# Patient Record
Sex: FEMALE | Race: WHITE | Employment: OTHER | URBAN - METROPOLITAN AREA
[De-identification: names, ages, dates, MRNs, and addresses within clinical notes are randomized per-mention and may not be internally consistent; named-entity substitution may affect disease eponyms.]

---

## 2019-10-11 ENCOUNTER — OFFICE VISIT (OUTPATIENT)
Dept: PHYSICAL THERAPY | Age: 79
End: 2019-10-11
Attending: SPECIALIST
Payer: MEDICARE

## 2019-10-11 ENCOUNTER — ORDER TRANSCRIPTION (OUTPATIENT)
Dept: PHYSICAL THERAPY | Age: 79
End: 2019-10-11

## 2019-10-11 DIAGNOSIS — R26.9 ABNORMAL GAIT: ICD-10-CM

## 2019-10-11 DIAGNOSIS — R26.9 ABNORMAL GAIT: Primary | ICD-10-CM

## 2019-10-11 DIAGNOSIS — M54.12 RADICULOPATHY, CERVICAL: ICD-10-CM

## 2019-10-11 DIAGNOSIS — M75.102 ROTATOR CUFF SYNDROME OF LEFT SHOULDER: ICD-10-CM

## 2019-10-11 PROCEDURE — 97110 THERAPEUTIC EXERCISES: CPT | Performed by: PHYSICAL THERAPIST

## 2019-10-11 PROCEDURE — 97162 PT EVAL MOD COMPLEX 30 MIN: CPT | Performed by: PHYSICAL THERAPIST

## 2019-10-11 NOTE — PROGRESS NOTES
SPINE EVALUATION:   Referring Physician: Dr. Dalila Garcia  Diagnosis: Abnormal gait (R26.9)  Rotator cuff syndrome of left shoulder (M75.102)  Radiculopathy, cervical (M54.12)   Date of Service: 10/11/2019   Date of Onset: June 2019    PATIENT Mani ROBERTS lordosis; posture correction relieved ache/pain in the neck, upper/lower back. Gait:   Uses a rolling walker (tall) with a narrow KEKE, short step length, decreased heel-toe, and push-off pattern; slow, safe, and deliberate gait.     ROM:  Lumbar Movement to promote her symptomfree condition. Patient was advised of these findings, precautions, and treatment options and has agreed to actively participate in planning/goal setting for this course of care.     Frequency / Duration: Patient will be seen for 2

## 2019-10-14 ENCOUNTER — OFFICE VISIT (OUTPATIENT)
Dept: PHYSICAL THERAPY | Age: 79
End: 2019-10-14
Attending: SPECIALIST
Payer: MEDICARE

## 2019-10-14 PROCEDURE — 97110 THERAPEUTIC EXERCISES: CPT | Performed by: PHYSICAL THERAPIST

## 2019-10-14 NOTE — PROGRESS NOTES
Date: 10/14/2019     Dx: Abnormal gait (R26.9)  Rotator cuff syndrome of left shoulder (M75.102)  Radiculopathy, cervical (M54.12)      Authorized # of Visits:  24       Referring MD: Meliton Haider.   Next MD visit: none scheduled    Medication Changes since last discomfort  4. Patient to consistently have good posture to promote her symptomfree condition. Plan:   Re-assess next session and continue to progress her neck, back, (L) shoulder, and LEs exercises. Charges:  TherEx x 3       Total Ti

## 2019-10-18 ENCOUNTER — OFFICE VISIT (OUTPATIENT)
Dept: PHYSICAL THERAPY | Age: 79
End: 2019-10-18
Attending: SPECIALIST
Payer: MEDICARE

## 2019-10-18 PROCEDURE — 97110 THERAPEUTIC EXERCISES: CPT | Performed by: PHYSICAL THERAPIST

## 2019-10-18 NOTE — PROGRESS NOTES
Date: 10/18/2019     Dx: Abnormal gait (R26.9)  Rotator cuff syndrome of left shoulder (M75.102)  Radiculopathy, cervical (M54.12)      Authorized # of Visits:  24       Referring MD: Miri Cox.   Next MD visit: none scheduled    Medication Changes since last Her symptom produced was tightness in the C7T1 area that diminished with repetition. She was then feeling more stretch and tightness at the low back with c/s retractions.   She was sitting with good posture with lumbar roll used vertically in the middle of

## 2019-10-21 ENCOUNTER — OFFICE VISIT (OUTPATIENT)
Dept: PHYSICAL THERAPY | Age: 79
End: 2019-10-21
Attending: SPECIALIST
Payer: MEDICARE

## 2019-10-21 PROCEDURE — 97110 THERAPEUTIC EXERCISES: CPT | Performed by: PHYSICAL THERAPIST

## 2019-10-21 NOTE — PROGRESS NOTES
Date: 10/21/2019     Dx: Abnormal gait (R26.9)  Rotator cuff syndrome of left shoulder (M75.102)  Radiculopathy, cervical (M54.12)      Authorized # of Visits:  24       Referring MD: Eleanor Simeon.   Next MD visit: none scheduled    Medication Changes since last over rail x 10 reps         Assessment:   Initiated (B) LE strengthening and stability exercises. Only performed one set of each at this time due to patient may get sore since it is her first time to do these exercises.   She continued to perform extension

## 2019-10-25 ENCOUNTER — OFFICE VISIT (OUTPATIENT)
Dept: PHYSICAL THERAPY | Age: 79
End: 2019-10-25
Attending: SPECIALIST
Payer: MEDICARE

## 2019-10-25 PROCEDURE — 97110 THERAPEUTIC EXERCISES: CPT | Performed by: PHYSICAL THERAPIST

## 2019-10-25 NOTE — PROGRESS NOTES
Date: 10/25/2019     Dx: Abnormal gait (R26.9)  Rotator cuff syndrome of left shoulder (M75.102)  Radiculopathy, cervical (M54.12)      Authorized # of Visits:  24       Referring MD: Yari John.   Next MD visit: none scheduled    Medication Changes since last reps    Shuttle: (R/L) leg press 4b x 20 reps    Shuttle: (B) calf raises 4b x 20 reps     Seated: c/s extension x 10 reps    (B) UE n.row, extension greenTB 10 reps x 10 cts ea    (B) LE hip abduction and hip flexion with redTB 2 x 10 reps ea    PERRI over session and continue to progress her neck, back, (L) shoulder, and LEs exercises. Charges:  TherEx x 4       Total Timed Treatment: 54 mins Total Treatment Time: 58 mins

## 2019-10-28 ENCOUNTER — OFFICE VISIT (OUTPATIENT)
Dept: PHYSICAL THERAPY | Age: 79
End: 2019-10-28
Attending: SPECIALIST
Payer: MEDICARE

## 2019-10-28 PROCEDURE — 97110 THERAPEUTIC EXERCISES: CPT | Performed by: PHYSICAL THERAPIST

## 2019-10-28 NOTE — PROGRESS NOTES
Date: 10/28/2019     Dx: Abnormal gait (R26.9)  Rotator cuff syndrome of left shoulder (M75.102)  Radiculopathy, cervical (M54.12)      Authorized # of Visits:  24       Referring MD: Steffen Banerjee.   Next MD visit: none scheduled    Medication Changes since last 4b x 20 reps    Shuttle: (B) calf raises 4b x 20 reps     Seated: c/s extension x 10 reps    (B) UE n.row, extension greenTB 10 reps x 10 cts ea    (B) LE hip abduction and hip flexion with redTB 2 x 10 reps ea    PERRI over rail x 10 reps Scifit UE only L4 next session and continue to progress her neck, back, (L) shoulder, and LEs exercises. Charges:  TherEx x 3       Total Timed Treatment: 48 mins Total Treatment Time: 50 mins

## 2019-11-01 ENCOUNTER — OFFICE VISIT (OUTPATIENT)
Dept: PHYSICAL THERAPY | Age: 79
End: 2019-11-01
Attending: SPECIALIST
Payer: MEDICARE

## 2019-11-01 PROCEDURE — 97110 THERAPEUTIC EXERCISES: CPT | Performed by: PHYSICAL THERAPIST

## 2019-11-01 NOTE — PROGRESS NOTES
Date: 11/1/2019     Dx: Abnormal gait (R26.9)  Rotator cuff syndrome of left shoulder (M75.102)  Radiculopathy, cervical (M54.12)      Authorized # of Visits:  24       Referring MD: Truong Mayo.   Next MD visit: none scheduled    Medication Changes since last 10 cts ea    (B) LE hip abduction and hip flexion with redTB 2 x 10 reps ea    PERRI over rail x 10 reps Scifit UE only L4 x 5 mins fwd/bkwd;  No soreness (B) shoulders    Seated: c/s retraction x 10 reps; P,NW mid neck     + self OP x 10 reps; P, NW [facility] paper work activity she does for her sister, cooking, gardening, taking care of pet birds, playing cards, reading, watching TV, and using her phone) activities without discomfort  4.  Patient to consistently have good posture to promote her symptomfree cond

## 2019-11-05 ENCOUNTER — OFFICE VISIT (OUTPATIENT)
Dept: PHYSICAL THERAPY | Age: 79
End: 2019-11-05
Attending: ORTHOPAEDIC SURGERY
Payer: MEDICARE

## 2019-11-05 PROCEDURE — 97110 THERAPEUTIC EXERCISES: CPT | Performed by: PHYSICAL THERAPIST

## 2019-11-05 NOTE — PROGRESS NOTES
Date: 11/5/2019     Dx: Abnormal gait (R26.9)  Rotator cuff syndrome of left shoulder (M75.102)  Radiculopathy, cervical (M54.12)      Authorized # of Visits:  24       Referring MD: Claudia Reed.   Next MD visit: none scheduled    Medication Changes since last with redTB 2 x 10 reps ea    PERRI over rail x 10 reps Scifit UE only L4 x 5 mins fwd/bkwd;  No soreness (B) shoulders    Seated: c/s retraction x 10 reps; P,NW mid neck     + self OP x 10 reps; P, NW mid neck and low back     + extension 2 x 10 reps; P, NW Therapy Goals      (24 visits)  1. Patient to consistently perform her HEP and it's progression to maintain her improved condition.    2. Patient to have reduced, centralized, and abolished symptoms in the low back and neck to enable easier ADLs, functional

## 2019-11-07 ENCOUNTER — APPOINTMENT (OUTPATIENT)
Dept: PHYSICAL THERAPY | Age: 79
End: 2019-11-07
Attending: ORTHOPAEDIC SURGERY
Payer: MEDICARE

## 2019-11-12 ENCOUNTER — OFFICE VISIT (OUTPATIENT)
Dept: PHYSICAL THERAPY | Age: 79
End: 2019-11-12
Attending: ORTHOPAEDIC SURGERY
Payer: MEDICARE

## 2019-11-12 PROCEDURE — 97110 THERAPEUTIC EXERCISES: CPT | Performed by: PHYSICAL THERAPIST

## 2019-11-12 NOTE — PROGRESS NOTES
Date: 11/12/2019     Dx: Abnormal gait (R26.9)  Rotator cuff syndrome of left shoulder (M75.102)  Radiculopathy, cervical (M54.12)      Authorized # of Visits:  24       Referring MD: Aurora Pace.   Next MD visit: none scheduled    Medication Changes since last 10 reps; P, NW mid neck and low back     + extension 2 x 10 reps; P, NW mid neck (better)    (B) UE greenTB n.row, extension, w.row 10 rep x 10 cts ea; NE    Lateral walk with CGA with redTB abd resist 3 laps x 20 feet    Shuttle: (B) leg press 8b x 20 rep recreational activities.    3. Patient to have WNL of lumbar and cervical mobility in all directions to facilitate a return to all (home paper work activity she does for her sister, cooking, gardening, taking care of pet birds, playing cards, reading, watch

## 2019-11-14 ENCOUNTER — OFFICE VISIT (OUTPATIENT)
Dept: PHYSICAL THERAPY | Age: 79
End: 2019-11-14
Attending: ORTHOPAEDIC SURGERY
Payer: MEDICARE

## 2019-11-14 PROCEDURE — 97110 THERAPEUTIC EXERCISES: CPT | Performed by: PHYSICAL THERAPIST

## 2019-11-14 NOTE — PROGRESS NOTES
Date: 11/14/2019     Dx: Abnormal gait (R26.9)  Rotator cuff syndrome of left shoulder (M75.102)  Radiculopathy, cervical (M54.12)      Authorized # of Visits:  24       Referring MD: Jeffrey Byrd.   Next MD visit: none scheduled    Medication Changes since last reps Scifit UE only L4 x 5 mins fwd/bkwd;  No soreness (B) shoulders    Seated: c/s retraction x 10 reps; P,NW mid neck     + self OP x 10 reps; P, NW mid neck and low back     + extension 2 x 10 reps; P, NW mid neck (better)    (B) UE greenTB n.row, extens she was out of breath. She used an inhaler to help her breath but she was not in any distress. She was willing to do her exercises as long as she can rest between exercises. No pain reported or tired legs today.   Cueing also needed to maintain good alig

## 2019-11-19 ENCOUNTER — OFFICE VISIT (OUTPATIENT)
Dept: PHYSICAL THERAPY | Age: 79
End: 2019-11-19
Attending: ORTHOPAEDIC SURGERY
Payer: MEDICARE

## 2019-11-19 PROCEDURE — 97110 THERAPEUTIC EXERCISES: CPT | Performed by: PHYSICAL THERAPIST

## 2019-11-19 NOTE — PROGRESS NOTES
Date: 11/19/2019     Dx: Abnormal gait (R26.9)  Rotator cuff syndrome of left shoulder (M75.102)  Radiculopathy, cervical (M54.12)      Authorized # of Visits:  24       Referring MD: Kelechi Alcantar.   Next MD visit: none scheduled    Medication Changes since last reps x 10 cts ea    (B) LE hip abduction and hip flexion with redTB 2 x 10 reps ea    PERRI over rail x 10 reps Scifit UE only L4 x 5 mins fwd/bkwd;  No soreness (B) shoulders    Seated: c/s retraction x 10 reps; P,NW mid neck     + self OP x 10 reps; P, NW greenTB 10 reps x 10 cts ea    PERRI over rail x 10 reps; NE   Scifit L4 UE only x 3 mins fwd/bkwd    Step up onto a 4 inch step (R/L) LE lead x 10 reps ea    Lateral walk with yelowTB abd resist with CGA/Denise 3 laps x 10 feet    SLStance on bluefoam with o

## 2019-11-22 ENCOUNTER — OFFICE VISIT (OUTPATIENT)
Dept: PHYSICAL THERAPY | Age: 79
End: 2019-11-22
Attending: ORTHOPAEDIC SURGERY
Payer: MEDICARE

## 2019-11-22 PROCEDURE — 97110 THERAPEUTIC EXERCISES: CPT | Performed by: PHYSICAL THERAPIST

## 2019-11-22 NOTE — PROGRESS NOTES
Date: 11/22/2019         Dx: Abnormal gait (R26.9)  Rotator cuff syndrome of left shoulder (M75.102)  Radiculopathy, cervical (M54.12)      Authorized # of Visits:  24       Referring MD: Sudheer Beltran.   Next MD visit: none scheduled    Medication Changes since ea    PERRI over rail x 10 reps Scifit UE only L4 x 5 mins fwd/bkwd;  No soreness (B) shoulders    Seated: c/s retraction x 10 reps; P,NW mid neck     + self OP x 10 reps; P, NW mid neck and low back     + extension 2 x 10 reps; P, NW mid neck (better)    (B reps; NE   Scifit L4 UE only x 3 mins fwd/bkwd    Step up onto a 4 inch step (R/L) LE lead x 10 reps ea    Lateral walk with yelowTB abd resist with CGA/Denise 3 laps x 10 feet    SLStance on bluefoam with opposite LE red tumble form roll tap 2 x 5 reps ea w

## 2019-12-03 ENCOUNTER — OFFICE VISIT (OUTPATIENT)
Dept: PHYSICAL THERAPY | Age: 79
End: 2019-12-03
Attending: ORTHOPAEDIC SURGERY
Payer: MEDICARE

## 2019-12-03 PROCEDURE — 97110 THERAPEUTIC EXERCISES: CPT | Performed by: PHYSICAL THERAPIST

## 2019-12-03 NOTE — PROGRESS NOTES
Date: 12/3/2019         Dx: Abnormal gait (R26.9)  Rotator cuff syndrome of left shoulder (M75.102)  Radiculopathy, cervical (M54.12)      Authorized # of Visits:  24       Referring MD: rBo Trejo.   Next MD visit: none scheduled    Medication Changes since l calf raises 4b x 20 reps     Seated: c/s extension x 10 reps    (B) UE n.row, extension greenTB 10 reps x 10 cts ea    (B) LE hip abduction and hip flexion with redTB 2 x 10 reps ea    PERRI over rail x 10 reps Scifit UE only L4 x 5 mins fwd/bkwd;  No sorene Scifit LE only L8 x 10 mins    Shuttle: (B) leg press 8b 2 x 25 reps    Shuttle: (R/L) leg press 5b 2 x 20 reps; NE tired legs    Seated: (B) UE w.row greenTB 10 reps x 10 cts ea    PERRI over rail x 10 reps; NE   Scifit L4 UE only x 3 mins fwd/bkwd    Step work, and recreational activities.    3. Patient to have WNL of lumbar and cervical mobility in all directions to facilitate a return to all (home paper work activity she does for her sister, cooking, gardening, taking care of pet birds, playing cards, read

## 2019-12-05 ENCOUNTER — APPOINTMENT (OUTPATIENT)
Dept: PHYSICAL THERAPY | Age: 79
End: 2019-12-05
Attending: ORTHOPAEDIC SURGERY
Payer: MEDICARE

## 2019-12-06 ENCOUNTER — OFFICE VISIT (OUTPATIENT)
Dept: PHYSICAL THERAPY | Age: 79
End: 2019-12-06
Attending: INTERNAL MEDICINE
Payer: MEDICARE

## 2019-12-06 PROCEDURE — 97110 THERAPEUTIC EXERCISES: CPT | Performed by: PHYSICAL THERAPIST

## 2019-12-06 NOTE — PROGRESS NOTES
Date: 12/6/2019         Dx: Abnormal gait (R26.9)  Rotator cuff syndrome of left shoulder (M75.102)  Radiculopathy, cervical (M54.12)      Authorized # of Visits:  24       Referring MD: Sari Cordero.   Next MD visit: none scheduled    Medication Changes since l extension x 10 reps    (B) UE n.row, extension greenTB 10 reps x 10 cts ea    (B) LE hip abduction and hip flexion with redTB 2 x 10 reps ea    PERRI over rail x 10 reps Scifit UE only L4 x 5 mins fwd/bkwd;  No soreness (B) shoulders    Seated: c/s retractio L8 x 10 mins    Shuttle: (B) leg press 8b 2 x 25 reps    Shuttle: (R/L) leg press 5b 2 x 20 reps; NE tired legs    Seated: (B) UE w.row greenTB 10 reps x 10 cts ea    PERRI over rail x 10 reps; NE   Scifit L4 UE only x 3 mins fwd/bkwd    Step up onto a 4 in low back further without pain. She still need minimal cueing to ambulate within her rolling walker and not reaching ahead for it which promotes her to slouch and look down. Goals:   Goals     • Therapy Goals      (24 visits)  1.  Patient to consistentl

## 2019-12-10 ENCOUNTER — OFFICE VISIT (OUTPATIENT)
Dept: PHYSICAL THERAPY | Age: 79
End: 2019-12-10
Attending: ORTHOPAEDIC SURGERY
Payer: MEDICARE

## 2019-12-10 PROCEDURE — 97110 THERAPEUTIC EXERCISES: CPT | Performed by: PHYSICAL THERAPIST

## 2019-12-10 NOTE — PROGRESS NOTES
Date: 12/6/2019         Dx: Abnormal gait (R26.9)  Rotator cuff syndrome of left shoulder (M75.102)  Radiculopathy, cervical (M54.12)      Authorized # of Visits:  24       Referring MD: Kashmir Gardner.   Next MD visit: none scheduled    Medication Changes since l extension x 10 reps    (B) UE n.row, extension greenTB 10 reps x 10 cts ea    (B) LE hip abduction and hip flexion with redTB 2 x 10 reps ea    PERRI over rail x 10 reps Scifit UE only L4 x 5 mins fwd/bkwd;  No soreness (B) shoulders    Seated: c/s retractio L8 x 10 mins    Shuttle: (B) leg press 8b 2 x 25 reps    Shuttle: (R/L) leg press 5b 2 x 20 reps; NE tired legs    Seated: (B) UE w.row greenTB 10 reps x 10 cts ea    PERRI over rail x 10 reps; NE   Scifit L4 UE only x 3 mins fwd/bkwd    Step up onto a 4 in self OP x 10 reps     + eccentric greenTB IR load 10 reps x 10 cts ea    PERRI over rail x 10 reps          Assessment: Manuel Zarate is progressing with LE and (L) UE strengthening with increased repetitions and resistance with ache during exercise but no worse as

## 2019-12-12 ENCOUNTER — APPOINTMENT (OUTPATIENT)
Dept: PHYSICAL THERAPY | Age: 79
End: 2019-12-12
Attending: ORTHOPAEDIC SURGERY
Payer: MEDICARE

## 2019-12-17 ENCOUNTER — OFFICE VISIT (OUTPATIENT)
Dept: PHYSICAL THERAPY | Age: 79
End: 2019-12-17
Attending: ORTHOPAEDIC SURGERY
Payer: MEDICARE

## 2019-12-17 PROCEDURE — 97110 THERAPEUTIC EXERCISES: CPT | Performed by: PHYSICAL THERAPIST

## 2019-12-17 NOTE — PROGRESS NOTES
Date: 12/17/2019         Dx: Abnormal gait (R26.9)  Rotator cuff syndrome of left shoulder (M75.102)  Radiculopathy, cervical (M54.12)      Authorized # of Visits:  24       Referring MD: Yudelka Ledbetter.   Next MD visit: none scheduled    Medication Changes since extension x 10 reps    (B) UE n.row, extension greenTB 10 reps x 10 cts ea    (B) LE hip abduction and hip flexion with redTB 2 x 10 reps ea    PERRI over rail x 10 reps Scifit UE only L4 x 5 mins fwd/bkwd;  No soreness (B) shoulders    Seated: c/s retractio L8 x 10 mins    Shuttle: (B) leg press 8b 2 x 25 reps    Shuttle: (R/L) leg press 5b 2 x 20 reps; NE tired legs    Seated: (B) UE w.row greenTB 10 reps x 10 cts ea    PERRI over rail x 10 reps; NE   Scifit L4 UE only x 3 mins fwd/bkwd    Step up onto a 4 in neck    Seated: (L) shoulder IR with self OP x 10 reps     + eccentric greenTB IR load 10 reps x 10 cts ea    PERRI over rail x 10 reps NuStep LE only L4 x 10 mins    Shuttle: (B) leg press 8b 3 x 25 reps    Shuttle: (R/L) leg press 7b x 20+10 reps; NE tire

## 2019-12-19 ENCOUNTER — APPOINTMENT (OUTPATIENT)
Dept: PHYSICAL THERAPY | Age: 79
End: 2019-12-19
Attending: ORTHOPAEDIC SURGERY
Payer: MEDICARE

## 2019-12-23 ENCOUNTER — APPOINTMENT (OUTPATIENT)
Dept: PHYSICAL THERAPY | Age: 79
End: 2019-12-23
Attending: INTERNAL MEDICINE
Payer: MEDICARE

## 2020-01-01 ENCOUNTER — EXTERNAL RECORD (OUTPATIENT)
Dept: HEALTH INFORMATION MANAGEMENT | Facility: OTHER | Age: 80
End: 2020-01-01

## 2020-01-13 ENCOUNTER — PRIOR ORIGINAL RECORDS (OUTPATIENT)
Dept: HEALTH INFORMATION MANAGEMENT | Facility: OTHER | Age: 80
End: 2020-01-13

## 2020-01-14 ENCOUNTER — OFFICE VISIT (OUTPATIENT)
Dept: PHYSICAL THERAPY | Age: 80
End: 2020-01-14
Attending: SPECIALIST
Payer: MEDICARE

## 2020-01-14 PROCEDURE — 97110 THERAPEUTIC EXERCISES: CPT | Performed by: PHYSICAL THERAPIST

## 2020-01-14 NOTE — PROGRESS NOTES
Date: 1/14/2019         Dx: Abnormal gait (R26.9)  Rotator cuff syndrome of left shoulder (M75.102)  Radiculopathy, cervical (M54.12)      Authorized # of Visits:  24       Referring MD: Sari Cordero.   Next MD visit: none scheduled    Medication Changes since l ea    PERRI over rail x 10 reps Scifit UE only L4 x 5 mins fwd/bkwd;  No soreness (B) shoulders    Seated: c/s retraction x 10 reps; P,NW mid neck     + self OP x 10 reps; P, NW mid neck and low back     + extension 2 x 10 reps; P, NW mid neck (better)    (B greenTB 10 reps x 10 cts ea    PERRI over rail x 10 reps; NE   Scifit L4 UE only x 3 mins fwd/bkwd    Step up onto a 4 inch step (R/L) LE lead x 10 reps ea    Lateral walk with yelowTB abd resist with CGA/Denise 3 laps x 10 feet    SLStance on bluefoam with o rail x 10 reps NuStep LE only L4 x 10 mins    Shuttle: (B) leg press 8b 3 x 25 reps    Shuttle: (R/L) leg press 7b x 20+10 reps; NE tired legs     (B) UE greenTB n.row, ext., w.row 10 reps x 10 cts    PERRI over rail x 10 reps     Sitting in chair with lumb TherEx x 3       Total Timed Treatment: 50 mins Total Treatment Time: 55 mins

## 2020-01-16 ENCOUNTER — OFFICE VISIT (OUTPATIENT)
Dept: PHYSICAL THERAPY | Age: 80
End: 2020-01-16
Attending: SPECIALIST
Payer: MEDICARE

## 2020-01-16 PROCEDURE — 97110 THERAPEUTIC EXERCISES: CPT | Performed by: PHYSICAL THERAPIST

## 2020-01-16 NOTE — PROGRESS NOTES
Date: 1/14/2019         Dx: Abnormal gait (R26.9)  Rotator cuff syndrome of left shoulder (M75.102)  Radiculopathy, cervical (M54.12)      Authorized # of Visits:  24       Referring MD: Ángel Herrera.   Next MD visit: none scheduled    Medication Changes since l ea    PERRI over rail x 10 reps Scifit UE only L4 x 5 mins fwd/bkwd;  No soreness (B) shoulders    Seated: c/s retraction x 10 reps; P,NW mid neck     + self OP x 10 reps; P, NW mid neck and low back     + extension 2 x 10 reps; P, NW mid neck (better)    (B greenTB 10 reps x 10 cts ea    PERRI over rail x 10 reps; NE   Scifit L4 UE only x 3 mins fwd/bkwd    Step up onto a 4 inch step (R/L) LE lead x 10 reps ea    Lateral walk with yelowTB abd resist with CGA/Denise 3 laps x 10 feet    SLStance on bluefoam with o x 10 cts ea    PERRI over rail x 10 reps NuStep LE only L4 x 10 mins    Shuttle: (B) leg press 8b 3 x 25 reps    Shuttle: (R/L) leg press 7b x 20+10 reps; NE tired legs     (B) UCHANDAN Alston n.row, ext., w.row 10 reps x 10 cts    PERRI over rail x 10 reps     S of lumbar and cervical mobility in all directions to facilitate a return to all (home paper work activity she does for her sister, cooking, gardening, taking care of pet birds, playing cards, reading, watching TV, and using her phone) activities without di

## 2020-01-21 ENCOUNTER — OFFICE VISIT (OUTPATIENT)
Dept: PHYSICAL THERAPY | Age: 80
End: 2020-01-21
Attending: SPECIALIST
Payer: MEDICARE

## 2020-01-21 PROCEDURE — 97110 THERAPEUTIC EXERCISES: CPT | Performed by: PHYSICAL THERAPIST

## 2020-01-21 NOTE — PROGRESS NOTES
Date: 1/21/2020         Dx: Abnormal gait (R26.9)  Rotator cuff syndrome of left shoulder (M75.102)  Radiculopathy, cervical (M54.12)      Authorized # of Visits:  24       Referring MD: Brianne Torrez.   Next MD visit: none scheduled    Medication Changes since l 10 reps ea    PERRI over rail x 10 reps Scifit UE only L4 x 5 mins fwd/bkwd;  No soreness (B) shoulders    Seated: c/s retraction x 10 reps; P,NW mid neck     + self OP x 10 reps; P, NW mid neck and low back     + extension 2 x 10 reps; P, NW mid neck (elisa w.row greenTB 10 reps x 10 cts ea    PERRI over rail x 10 reps; NE   Scifit L4 UE only x 3 mins fwd/bkwd    Step up onto a 4 inch step (R/L) LE lead x 10 reps ea    Lateral walk with yelowTB abd resist with CGA/Denise 3 laps x 10 feet    SLStance on bluefoam eccentric greenTB IR load 10 reps x 10 cts ea    PERRI over rail x 10 reps NuStep LE only L4 x 10 mins    Shuttle: (B) leg press 8b 3 x 25 reps    Shuttle: (R/L) leg press 7b x 20+10 reps; NE tired legs     (B) UE greenTB n.row, ext., w.row 10 reps x 10 cts directional preference but without overpressure. She was instructed to perform PERRI and sitting with a smaller lumbar roll while sitting. Goals:   Goals     • Therapy Goals      (24 visits)  1.  Patient to consistently perform her HEP and it's progres

## 2020-01-23 ENCOUNTER — OFFICE VISIT (OUTPATIENT)
Dept: PHYSICAL THERAPY | Age: 80
End: 2020-01-23
Attending: SPECIALIST
Payer: MEDICARE

## 2020-01-23 PROCEDURE — 97110 THERAPEUTIC EXERCISES: CPT | Performed by: PHYSICAL THERAPIST

## 2020-01-23 NOTE — PROGRESS NOTES
Date: 1/23/2020         Dx: Abnormal gait (R26.9)  Rotator cuff syndrome of left shoulder (M75.102)  Radiculopathy, cervical (M54.12)      Authorized # of Visits:  24       Referring MD: Sair Cordero.   Next MD visit: none scheduled    Medication Changes since l x 10 reps ea    PERRI over rail x 10 reps Scifit UE only L4 x 5 mins fwd/bkwd;  No soreness (B) shoulders    Seated: c/s retraction x 10 reps; P,NW mid neck     + self OP x 10 reps; P, NW mid neck and low back     + extension 2 x 10 reps; P, NW mid neck (bet w.row greenTB 10 reps x 10 cts ea    PERRI over rail x 10 reps; NE   Scifit L4 UE only x 3 mins fwd/bkwd    Step up onto a 4 inch step (R/L) LE lead x 10 reps ea    Lateral walk with yelowTB abd resist with CGA/Denise 3 laps x 10 feet    SLStance on bluefoam with self OP x 10 reps     + eccentric greenTB IR load 10 reps x 10 cts ea    PERRI over rail x 10 reps NuStep LE only L4 x 10 mins    Shuttle: (B) leg press 8b 3 x 25 reps    Shuttle: (R/L) leg press 7b x 20+10 reps; NE tired legs     (B) UE greenTB n.row, mins    PERRI with hands on wall x 10 reps; Dec, B    Sitting posture correction with lumbar roll (feels better with superroll)    (B) UE n.row, w.row greenTB 10 reps x 10 cts ea    PERRI over rail x 10 reps; Dec, B    Stand to sit to stand transfer; painful

## 2020-01-28 ENCOUNTER — TELEPHONE (OUTPATIENT)
Dept: PHYSICAL THERAPY | Age: 80
End: 2020-01-28

## 2020-02-04 DIAGNOSIS — M54.50 LOW BACK PAIN: Primary | ICD-10-CM

## 2020-02-11 ENCOUNTER — HOSPITAL ENCOUNTER (OUTPATIENT)
Dept: GENERAL RADIOLOGY | Age: 80
Discharge: HOME | End: 2020-02-11
Attending: PAIN MEDICINE

## 2020-02-11 ENCOUNTER — HOSPITAL ENCOUNTER (OUTPATIENT)
Dept: GASTROENTEROLOGY | Age: 80
Discharge: HOME | End: 2020-02-11
Attending: PAIN MEDICINE

## 2020-02-11 ENCOUNTER — ANCILLARY ORDERS (OUTPATIENT)
Dept: GENERAL RADIOLOGY | Age: 80
End: 2020-02-11

## 2020-02-11 VITALS
OXYGEN SATURATION: 99 % | WEIGHT: 180.56 LBS | TEMPERATURE: 96.6 F | DIASTOLIC BLOOD PRESSURE: 70 MMHG | HEART RATE: 75 BPM | HEIGHT: 68 IN | RESPIRATION RATE: 16 BRPM | SYSTOLIC BLOOD PRESSURE: 138 MMHG | BODY MASS INDEX: 27.36 KG/M2

## 2020-02-11 DIAGNOSIS — R52 PAIN: ICD-10-CM

## 2020-02-11 PROCEDURE — 72100 X-RAY EXAM L-S SPINE 2/3 VWS: CPT

## 2020-02-11 PROCEDURE — 10004451 HB PACU RECOVERY 1ST 30 MINUTES

## 2020-02-11 PROCEDURE — 13000001 HB PHASE II RECOVERY EA 30 MINUTES

## 2020-02-11 PROCEDURE — 13000067 HB PAIN MANAGEMENT GROUP 2

## 2020-02-11 PROCEDURE — 76000 FLUOROSCOPY <1 HR PHYS/QHP: CPT

## 2020-02-11 RX ORDER — TORSEMIDE 10 MG/1
10 TABLET ORAL
COMMUNITY

## 2020-02-11 RX ORDER — LISINOPRIL 2.5 MG/1
2.5 TABLET ORAL
COMMUNITY

## 2020-02-11 RX ORDER — FLUTICASONE PROPIONATE AND SALMETEROL 100; 50 UG/1; UG/1
1 POWDER RESPIRATORY (INHALATION)
COMMUNITY

## 2020-02-11 RX ORDER — DABIGATRAN ETEXILATE 150 MG/1
150 CAPSULE ORAL EVERY 12 HOURS SCHEDULED
COMMUNITY

## 2020-02-11 RX ORDER — PAROXETINE 30 MG/1
30 TABLET, FILM COATED ORAL EVERY MORNING
COMMUNITY

## 2020-02-11 RX ORDER — PREGABALIN 300 MG/1
300 CAPSULE ORAL 2 TIMES DAILY
COMMUNITY
End: 2020-05-27

## 2020-02-11 RX ORDER — LEVOTHYROXINE SODIUM 0.12 MG/1
125 TABLET ORAL DAILY
COMMUNITY

## 2020-02-11 RX ORDER — PANTOPRAZOLE SODIUM 40 MG/1
40 TABLET, DELAYED RELEASE ORAL DAILY
COMMUNITY

## 2020-02-11 ASSESSMENT — ACTIVITIES OF DAILY LIVING (ADL)
MOBILITY_ASSIST_DEVICES: WHEELCHAIR
HISTORY OF FALLING IN THE LAST YEAR (PRIOR TO ADMISSION): NO
FEEDING: INDEPENDENT
TOILETING: NEEDS ASSISTANCE
CONTINENCE: INDEPENDENT
TRANSFERRING: NEEDS ASSISTANCE
DRESSING: NEEDS ASSISTANCE
BATHING: NEEDS ASSISTANCE
ADL_BEFORE_ADMISSION: NEEDS/REQUIRES ASSISTANCE
ADL_SCORE: 8

## 2020-02-11 ASSESSMENT — PAIN SCALES - GENERAL
PAINLEVEL_OUTOF10: 0
PAINLEVEL_OUTOF10: 0

## 2020-03-18 ENCOUNTER — APPOINTMENT (OUTPATIENT)
Dept: NEUROLOGY | Age: 80
End: 2020-03-18

## 2020-04-24 ENCOUNTER — EXTERNAL RECORD (OUTPATIENT)
Dept: NEUROLOGY | Age: 80
End: 2020-04-24

## 2020-04-29 ENCOUNTER — OFFICE VISIT (OUTPATIENT)
Dept: NEUROLOGY | Age: 80
End: 2020-04-29

## 2020-04-29 DIAGNOSIS — R27.0 ATAXIA: Primary | ICD-10-CM

## 2020-04-29 PROCEDURE — 99442 TELEPHONE E&M BY PHYSICIAN EST PT NOT ORIG PREV 7 DAYS 11-20 MIN: CPT | Performed by: PSYCHIATRY & NEUROLOGY

## 2020-05-27 RX ORDER — PREGABALIN 300 MG/1
CAPSULE ORAL
Qty: 90 CAPSULE | Refills: 0 | Status: SHIPPED | OUTPATIENT
Start: 2020-05-27 | End: 2020-09-24 | Stop reason: SDUPTHER

## 2020-06-26 ENCOUNTER — TELEPHONE (OUTPATIENT)
Dept: NEUROLOGY | Age: 80
End: 2020-06-26

## 2020-08-26 ENCOUNTER — APPOINTMENT (OUTPATIENT)
Dept: NEUROLOGY | Age: 80
End: 2020-08-26

## 2020-09-24 DIAGNOSIS — R27.0 ATAXIA: Primary | ICD-10-CM

## 2020-09-25 RX ORDER — PREGABALIN 300 MG/1
300 CAPSULE ORAL NIGHTLY
Qty: 90 CAPSULE | Refills: 1 | Status: SHIPPED | OUTPATIENT
Start: 2020-09-25 | End: 2021-03-23 | Stop reason: SDUPTHER

## 2020-10-14 ENCOUNTER — V-VISIT (OUTPATIENT)
Dept: NEUROLOGY | Age: 80
End: 2020-10-14

## 2020-10-14 DIAGNOSIS — R27.0 ATAXIA: Primary | ICD-10-CM

## 2020-10-14 PROCEDURE — 99213 OFFICE O/P EST LOW 20 MIN: CPT | Performed by: PSYCHIATRY & NEUROLOGY

## 2020-10-14 RX ORDER — BISOPROLOL FUMARATE 5 MG/1
TABLET, FILM COATED ORAL
COMMUNITY
Start: 2020-09-25

## 2020-10-14 RX ORDER — POTASSIUM CITRATE 10 MEQ/1
TABLET, EXTENDED RELEASE ORAL
COMMUNITY
Start: 2020-09-08

## 2021-03-23 DIAGNOSIS — R27.0 ATAXIA: ICD-10-CM

## 2021-03-23 RX ORDER — PREGABALIN 300 MG/1
300 CAPSULE ORAL NIGHTLY
Qty: 90 CAPSULE | Refills: 1 | Status: SHIPPED | OUTPATIENT
Start: 2021-03-23

## 2021-05-05 ENCOUNTER — ORDER TRANSCRIPTION (OUTPATIENT)
Dept: PHYSICAL THERAPY | Facility: HOSPITAL | Age: 81
End: 2021-05-05

## 2021-05-05 DIAGNOSIS — R27.0 ATAXIA: ICD-10-CM

## 2021-05-05 DIAGNOSIS — G60.9 IDIOPATHIC PERIPHERAL NEUROPATHY: Primary | ICD-10-CM

## 2022-01-10 PROBLEM — M89.49: Status: ACTIVE | Noted: 2022-01-10

## 2022-01-10 PROBLEM — I10 ESSENTIAL HYPERTENSION, BENIGN: Status: ACTIVE | Noted: 2022-01-10

## 2022-01-10 PROBLEM — G62.9 PERIPHERAL POLYNEUROPATHY: Status: ACTIVE | Noted: 2022-01-10

## 2022-01-10 PROBLEM — J44.89 CHRONIC OBSTRUCTIVE ASTHMA: Status: ACTIVE | Noted: 2022-01-10

## 2022-01-10 PROBLEM — S32.512D CLOSED FRACTURE OF SUPERIOR RAMUS OF LEFT PUBIS WITH ROUTINE HEALING, SUBSEQUENT ENCOUNTER: Status: ACTIVE | Noted: 2022-01-10

## 2022-01-10 PROBLEM — J44.9 CHRONIC OBSTRUCTIVE ASTHMA (HCC): Status: ACTIVE | Noted: 2022-01-10

## 2022-01-10 PROBLEM — E78.2 MIXED HYPERLIPIDEMIA: Status: ACTIVE | Noted: 2022-01-10

## 2022-01-10 PROBLEM — J44.9 CHRONIC OBSTRUCTIVE ASTHMA: Status: ACTIVE | Noted: 2022-01-10

## 2022-01-10 PROBLEM — J44.89 CHRONIC OBSTRUCTIVE ASTHMA (HCC): Status: ACTIVE | Noted: 2022-01-10

## 2022-01-10 PROBLEM — I48.0 PAROXYSMAL ATRIAL FIBRILLATION (HCC): Status: ACTIVE | Noted: 2022-01-10

## 2022-01-10 PROBLEM — F33.1 MODERATE EPISODE OF RECURRENT MAJOR DEPRESSIVE DISORDER (HCC): Status: ACTIVE | Noted: 2022-01-10

## 2022-01-10 PROBLEM — E03.9 ACQUIRED HYPOTHYROIDISM: Status: ACTIVE | Noted: 2022-01-10

## 2022-09-22 ENCOUNTER — TELEPHONE (OUTPATIENT)
Dept: PHYSICAL THERAPY | Facility: HOSPITAL | Age: 82
End: 2022-09-22

## 2022-09-23 ENCOUNTER — TELEPHONE (OUTPATIENT)
Dept: PHYSICAL THERAPY | Facility: HOSPITAL | Age: 82
End: 2022-09-23

## 2022-09-23 ENCOUNTER — ORDER TRANSCRIPTION (OUTPATIENT)
Dept: PHYSICAL THERAPY | Facility: HOSPITAL | Age: 82
End: 2022-09-23

## 2022-09-23 DIAGNOSIS — Z98.890 STATUS POST OPEN REDUCTION AND INTERNAL FIXATION (ORIF) OF FRACTURE: Primary | ICD-10-CM

## 2022-09-23 DIAGNOSIS — Z87.81 STATUS POST OPEN REDUCTION AND INTERNAL FIXATION (ORIF) OF FRACTURE: Primary | ICD-10-CM

## 2022-10-05 ENCOUNTER — APPOINTMENT (OUTPATIENT)
Dept: PHYSICAL THERAPY | Age: 82
End: 2022-10-05
Payer: MEDICARE

## 2022-10-05 ENCOUNTER — TELEPHONE (OUTPATIENT)
Dept: PHYSICAL THERAPY | Facility: HOSPITAL | Age: 82
End: 2022-10-05

## 2022-10-07 ENCOUNTER — OFFICE VISIT (OUTPATIENT)
Dept: PHYSICAL THERAPY | Age: 82
End: 2022-10-07
Attending: INTERNAL MEDICINE
Payer: MEDICARE

## 2022-10-07 PROCEDURE — 97110 THERAPEUTIC EXERCISES: CPT | Performed by: PHYSICAL THERAPIST

## 2022-10-07 PROCEDURE — 97162 PT EVAL MOD COMPLEX 30 MIN: CPT | Performed by: PHYSICAL THERAPIST

## 2022-10-07 NOTE — PROGRESS NOTES
LOWER EXTREMITY EVALUATION:   Referring Physician:    Diagnosis:  Status post open reduction and internal fixation (ORIF) of fracture (Z98.890,Z87.81)  LBP   Date of Onset: 07/04/2022 Date of Service: 10/7/2022     PATIENT Luke Smith is a [de-identified]year old y/o female who presents to therapy today with complaints of intermittent (L) anterior and lateral groin/hip/thigh ache/soreness/pain rated 0-5/10. She also report numbness/tingling of the (L) foot. She lives with her  Harsh Bhakta) with 10 steps to enter the home with (B) rail (close together) and 28 steps to their bedroom also with (B) rail (close together). She has hardwood and carpeting for a aundrea and has been using a rolling walker at home. She has a walk-in shower with a bench to take showers. History of current condition: Harini and Bhumika Jaimes report that she fell initially on January 2022 and fractured her (L) pelvis. She had physical therapy for 3.5 months and was released back home. She then fell in their washroom on July 4th and broke her (L) femur. She had surgery and had physical therapy (Rehab) for a couple of months. She is doing her exercises at home but is now referred to physical therapy for evaluation and treatment. Current functional limitation reported include inability to bend down, rise up from sitting, take the first few steps in the morning, walk, and climb up/donw stairs without producing or increasing symptoms in the (L) groin, lateral hip/buttock, and anterior thigh. Patient would like to to return to their previous level. ASSESSMENT:   Kate Escobar is presenting with an impaired (L) hip ROM, strength, mobility, stability, and balance due to recent and healing (L) hip surgery. She was instructed on her HEP and emphasized safety awareness and healing priority before doing too much exercises. Discussed also the importance of posture correction in sitting while she is healing and while doing exercises.   She was issued copies of her HEP. She agreed and understand the plan of care and all questions and concerns were answered and addressed at this time. Dionne Has would benefit from skilled Physical Therapy to address the above impairments. Precautions:  Fall Risk, WBAR (L) LE.     OBJECTIVE:   Observation:  (L) Hip incision site healed; (-) Tenderness, (-) Swelling, (-) Redness;  (-) Homans (L) LE;  Slouched, kyphotic posture with forward/protruded head and shoulders. She required 2 pillows under her head in supinelying to feel comfortable. Gait:  No antalgic gait observed; NBOS, short equal step length with decreased heel-toe and push-off on (L) LE; uses a rolling walker at home and in the department with CGA to SBA. Minimal cueing needed for safety awareness during sit to stand to sit. ROM/MMT:  (Pre-test symptom: 0/10 (L) hip/groin/thigh)  Hip   Flexion:  90 degs*/ 3+/5    Abduction:  30 degs*/ 3+/5   Adduction:  Neutral/ 3+/5  Extension:  N.T./ 3+/5    (*) pain at endrange     Patient education and instructions:   Patient education provided on derangement principle, directional preference exercise, establishing a test motion to assess improvement, goal setting with patient, and HEP. Patient was instructed in and issued HEP for: Supineying (L) LE Quadricep and Hamstring isometrics 10 reps x 10 cts ea,  (L) SLR 10 reps x 5-10 cts ea; Hooklying (B) hip abduction with redTB (issued) 10 reps x 10 cts ea;  2-3x/day. Charges: PT Eval x1, TherEx x 2      Total Timed Treatment: 30 mins     Total Treatment Time: 60 mins     PLAN OF CARE:    Goals:  (18 visits)  1. Patient to consistently perform their HEP and it's progression to maintain their improved condition.   2. Patient to have reduced and abolished symptoms to to be able to bend down, rise up from sitting, take the first few steps in the morning, walk, and climb up/donw stairs without producing or increasing symptoms in the (L) groin, lateral hip/buttock, and anterior thigh. 3. Patient to have WNL of (L) Hip AROM in all directions with 4/5 MMT in all motions to promote all home, work, recreational, and functional activities without discomfort or deviation. Patient was advised of these findings, precautions, and treatment options and has agreed to actively participate in planning/goal setting for this course of care. Frequency / Duration: Patient will be seen for 2-1 x/week or a total of 18 visits over a 90 day period. Treatment will include: Directional preference exercises, Manual Therapy; Therapeutic Exercises; Neuromuscular Re-education; Gait Training; Electrical Stim; Patient education; Home exercise program instruction. Education or treatment limitation: None  Rehab Potential:good    In agreement with FOTO score and clinical rationale, this evaluation involved Moderate Complexity decision making due to 3+ personal factors/comorbidities, 4+ body structures involved/activity limitations, and unstable symptoms including changing pain levels and (L) LE weight bearing status. Iveth Hall FOTO: Initial:  30/100;  Goal: 52/100    Patient/Family (/David) was advised of these findings, precautions, and treatment options and has agreed to actively participate in planning and for this course of care. Thank you for your referral. Please co-sign or sign and return this letter via fax as soon as possible to [phone]. If you have any questions, please contact me at Dept: [phone]    Sincerely,  Electronically signed by therapist: Claudio Nix PT Cert MDT    Physician's certification required: Yes  I certify the need for these services furnished under this plan of treatment and while under my care.     X___________________________________________________ Date____________________    Certification From: 75/6/2929  To:1/5/2023

## 2022-10-07 NOTE — PATIENT INSTRUCTIONS
Patient was instructed in and issued HEP for: Supineying (L) LE Quadricep and Hamstring isometrics 10 reps x 10 cts ea,  (L) SLR 10 reps x 5-10 cts ea; Hooklying (B) hip abduction with redTB (issued) 10 reps x 10 cts ea;  2-3x/day.

## 2022-10-12 ENCOUNTER — OFFICE VISIT (OUTPATIENT)
Dept: PHYSICAL THERAPY | Age: 82
End: 2022-10-12
Payer: MEDICARE

## 2022-10-12 DIAGNOSIS — Z87.81 STATUS POST OPEN REDUCTION AND INTERNAL FIXATION (ORIF) OF FRACTURE: ICD-10-CM

## 2022-10-12 DIAGNOSIS — Z98.890 STATUS POST OPEN REDUCTION AND INTERNAL FIXATION (ORIF) OF FRACTURE: ICD-10-CM

## 2022-10-12 PROCEDURE — 97110 THERAPEUTIC EXERCISES: CPT | Performed by: PHYSICAL THERAPIST

## 2022-10-12 NOTE — PROGRESS NOTES
Date: 10/12/2022         Dx: Status post open reduction and internal fixation (ORIF) of fracture (Z98.890,Z87.81)  LBP              Authorized # of Visits:  18       Referring MD:  Erika Tirado MD visit: none scheduled    Medication Changes since last visit?: No    Subjective: Harini report that she fell yesterday because was bending over and she hurt the (L) LE. She did not go to the ER but states that the (L) LE did hurt. The pain is better today but still achy. She has not done her (L) LE exercises since yesterday. Objective:  (-) Homans (L) LE;  (-) Tenderness (L) length of thigh;  (-) Warmth    Date: 10/12/2022  Tx#: 2/18 Date: Tx#: 3/ Date: Tx#: 4/ Date: Tx#: 5/ Date: Tx#: 6/ Date: Tx#: 7/ Date: Tx#: 8/   Ambulation to bed/table x 20 feet with rolling walker; ache (L) LE during stance phase    Supine: (L) LE hamstring and quadricep isometric 10 reps x 10 cts ea; NE    Supine: (L) LE SLR with belt assist 10 reps x 5-10 cts ea; NE    Supine: hooklying with greenTB 10 reps x 10 cts ea; NE    Supine: (B) LE bridge 10 reps x 10 cts ea; NE    Supine to sit; cueing needed to maintain good form and posture    Gait training with a rolling walker in department; no ache/pain (L) LE (better)           Assessment/HEP:   Harini reported no ache/soreness/pain in the (L) LE after her session even during ambulation. She was instructed to monitor her (L) LE if her symptom (ache/soreness/pain) will get better by the end of the day or tomorrow. If her symptoms worsen she was recommended to go to an immediate care facility. Her exercises today were kept the same level as the first session due to her report of falling yesterday and symptom of ache today. All questions and concerns were answered and addressed at this time. Goals:   (18 visits)  1. Patient to consistently perform their HEP and it's progression to maintain their improved condition.   2. Patient to have reduced and abolished symptoms to to be able to bend down, rise up from sitting, take the first few steps in the morning, walk, and climb up/donw stairs without producing or increasing symptoms in the (L) groin, lateral hip/buttock, and anterior thigh. 3. Patient to have WNL of (L) Hip AROM in all directions with 4/5 MMT in all motions to promote all home, work, recreational, and functional activities without discomfort or deviation. Plan:   Re-assess next session and continue with (L) LE ROM, strengthening, stretching, gait training, balance training, and HEP progression. Charges:  TherEx x 3       Total Timed Treatment: 46 mins Total Treatment Time: 48 mins

## 2022-10-14 ENCOUNTER — OFFICE VISIT (OUTPATIENT)
Dept: PHYSICAL THERAPY | Age: 82
End: 2022-10-14
Payer: MEDICARE

## 2022-10-14 DIAGNOSIS — Z87.81 STATUS POST OPEN REDUCTION AND INTERNAL FIXATION (ORIF) OF FRACTURE: ICD-10-CM

## 2022-10-14 DIAGNOSIS — Z98.890 STATUS POST OPEN REDUCTION AND INTERNAL FIXATION (ORIF) OF FRACTURE: ICD-10-CM

## 2022-10-14 PROCEDURE — 97110 THERAPEUTIC EXERCISES: CPT | Performed by: PHYSICAL THERAPIST

## 2022-10-14 NOTE — PROGRESS NOTES
Date: 10/14/2022         Dx: Status post open reduction and internal fixation (ORIF) of fracture (Z98.890,Z87.81)  LBP              Authorized # of Visits:  18       Referring MD:  Braxton Tirado MD visit: none scheduled    Medication Changes since last visit?: No    Subjective: Harini report that she feels sore in the (L) LE today. She thinks it's the muscle that she is feeling from last session. She was not able to able to do her HEP yesterday because she was tired. Objective:  (-) Homans (L) LE;  (-) Tenderness (L) length of thigh;  (-) Warmth    Date: 10/12/2022  Tx#: 2/18 Date: 10/14/2022  Tx#: 3/18 Date: Tx#: 4/ Date: Tx#: 5/ Date: Tx#: 6/ Date:    Tx#: 7/   Ambulation to bed/table x 20 feet with rolling walker; ache (L) LE during stance phase    Supine: (L) LE hamstring and quadricep isometric 10 reps x 10 cts ea; NE    Supine: (L) LE SLR with belt assist 10 reps x 5-10 cts ea; NE    Supine: hooklying with greenTB 10 reps x 10 cts ea; NE    Supine: (B) LE bridge 10 reps x 10 cts ea; NE    Supine to sit; cueing needed to maintain good form and posture    Gait training with a rolling walker in department; no ache/pain (L) LE (better) Ambulation to bed/table then to NuStep 2 x 20 feet with rolling walker; ache (L) LE during stance phase    NuStep LE only L1 x 10 mins; Dec, B    Ambulation to bed/mat less ache/pain (L) hip/thigh    Supine: (L) LE SLR 10+5 reps x 5 cts ea; NE    Supine: (L) hamstring sets 2 x 10 reps x 10 cts ea; NE tired    Supine: (B) LE bridge with hip abduction greenTB resist 10 reps x 10 cts ea; NE    SLY: (L) LE clam redTB 10 reps x 10 cts ea; NE tired    Seated: (L) hamstring curl redTB 10 reps x 10 cts ea; NE tired/cramping    Gait training with a rolling walker in department; P, NW ache/pain (L) LE (better)    PERRI over rail x 10 reps; NE         Assessment/HEP:   Peder Light still felt an ache in the (L) hip during ambulation after her session but no worse as a result (goes away in standing or sitting). She is was introduced combined motions of hip extension and abduction in supine without production of ache/pain. She was issued a copy of added HEP but stressed the importance of \"safety\" during exercises. All questions and concerns were answered and addressed at this time. Goals:   (18 visits)  1. Patient to consistently perform their HEP and it's progression to maintain their improved condition. 2. Patient to have reduced and abolished symptoms to to be able to bend down, rise up from sitting, take the first few steps in the morning, walk, and climb up/donw stairs without producing or increasing symptoms in the (L) groin, lateral hip/buttock, and anterior thigh. 3. Patient to have WNL of (L) Hip AROM in all directions with 4/5 MMT in all motions to promote all home, work, recreational, and functional activities without discomfort or deviation. Plan:   Re-assess next session and continue with (L) LE ROM, strengthening, stretching, gait training, balance training, and HEP progression. Charges:  TherEx x 4       Total Timed Treatment: 54 mins Total Treatment Time: 56 mins

## 2022-10-19 ENCOUNTER — OFFICE VISIT (OUTPATIENT)
Dept: PHYSICAL THERAPY | Age: 82
End: 2022-10-19
Payer: MEDICARE

## 2022-10-19 DIAGNOSIS — Z98.890 STATUS POST OPEN REDUCTION AND INTERNAL FIXATION (ORIF) OF FRACTURE: ICD-10-CM

## 2022-10-19 DIAGNOSIS — Z87.81 STATUS POST OPEN REDUCTION AND INTERNAL FIXATION (ORIF) OF FRACTURE: ICD-10-CM

## 2022-10-19 PROCEDURE — 97110 THERAPEUTIC EXERCISES: CPT | Performed by: PHYSICAL THERAPIST

## 2022-10-19 NOTE — PROGRESS NOTES
Date: 10/14/2022         Dx: Status post open reduction and internal fixation (ORIF) of fracture (Z98.890,Z87.81)  LBP              Authorized # of Visits:  18       Referring MD:  Sarai Garcia  Next MD visit: none scheduled    Medication Changes since last visit?: No    Subjective: Mitesh Alexandra report that she still feel the (L) hip ache/pain when she ambulates and place weight on the (L) LE reated 0-4/10. It's been the same since last week after her fall. She has been doing her HEP about 1-2x/day. Objective:  (-) Homans (L) LE;  (-) Tenderness (L) length of thigh;  (-) Warmth;    Date: 10/12/2022  Tx#: 2/18 Date: 10/14/2022  Tx#: 3/18 Date: 10/19/2022  Tx#: 4/18 Date: Tx#: 5/ Date: Tx#: 6/ Date:    Tx#: 7/   Ambulation to bed/table x 20 feet with rolling walker; ache (L) LE during stance phase    Supine: (L) LE hamstring and quadricep isometric 10 reps x 10 cts ea; NE    Supine: (L) LE SLR with belt assist 10 reps x 5-10 cts ea; NE    Supine: hooklying with greenTB 10 reps x 10 cts ea; NE    Supine: (B) LE bridge 10 reps x 10 cts ea; NE    Supine to sit; cueing needed to maintain good form and posture    Gait training with a rolling walker in department; no ache/pain (L) LE (better) Ambulation to bed/table then to NuStep 2 x 20 feet with rolling walker; ache (L) LE during stance phase    NuStep LE only L1 x 10 mins; Dec, B    Ambulation to bed/mat less ache/pain (L) hip/thigh    Supine: (L) LE SLR 10+5 reps x 5 cts ea; NE    Supine: (L) hamstring sets 2 x 10 reps x 10 cts ea; NE tired    Supine: (B) LE bridge with hip abduction greenTB resist 10 reps x 10 cts ea; NE    SLY: (L) LE clam redTB 10 reps x 10 cts ea; NE tired    Seated: (L) hamstring curl redTB 10 reps x 10 cts ea; NE tired/cramping    Gait training with a rolling walker in department; P, NW ache/pain (L) LE (better)    PERRI over rail x 10 reps; NE Ambulation with RW with SBA x 60 feet; P, NW (L) hip    PERRI over rail x 10 reps;  NE    No change (L) hip ache during ambulation    NuStep LE L1-2 x 10 mins; P, NW    Supine: (L) LE SLR 10+5 reps x 10 cts ea; NE (R) thigh muscles working    Supine hooklying: bridge 3 reps x 5 cts ea; P, NW (L) hip    Supine hooklying: (B) LE hip abduction green-blueTB 2 x 10 reps x 10 cts ea; NE    Supine to sit transfer Denise/CGA    Seated: (B) LE abduction blueTB resist 10 reps x 10 cts ea; NE    Ambulation with RW with SBA x 25 feet; same ache (L) hip    Shuttle: (B) leg press 3b 2 x 10 reps; NE    PERRI oer rail x 10 reps; NE    Ambulation with RW with SBA x 25 feet less pain (L) hip         Assessment/HEP:   Harini report that her (L) hip felt better at the end of her session. She was able to perform (B) leg press machine with light resistance but required cueing to correct position, form, and technique. She was issued a blueTB to progress her HEP and added seated exercises. Reinforced posture correction in sitting and standing and reminded Harini of performing PERRI to lightly stretch the lower back. All questions and concerns were answered and addressed at this time     Goals:   (18 visits)  1. Patient to consistently perform their HEP and it's progression to maintain their improved condition. 2. Patient to have reduced and abolished symptoms to to be able to bend down, rise up from sitting, take the first few steps in the morning, walk, and climb up/donw stairs without producing or increasing symptoms in the (L) groin, lateral hip/buttock, and anterior thigh. 3. Patient to have WNL of (L) Hip AROM in all directions with 4/5 MMT in all motions to promote all home, work, recreational, and functional activities without discomfort or deviation. Plan:   Re-assess next session and continue with (L) LE ROM, strengthening, stretching, gait training, balance training, and HEP progression. Charges:  TherEx x 4       Total Timed Treatment: 56 mins Total Treatment Time: 58 mins

## 2022-10-21 ENCOUNTER — OFFICE VISIT (OUTPATIENT)
Dept: PHYSICAL THERAPY | Age: 82
End: 2022-10-21
Payer: MEDICARE

## 2022-10-21 DIAGNOSIS — Z87.81 STATUS POST OPEN REDUCTION AND INTERNAL FIXATION (ORIF) OF FRACTURE: ICD-10-CM

## 2022-10-21 DIAGNOSIS — Z98.890 STATUS POST OPEN REDUCTION AND INTERNAL FIXATION (ORIF) OF FRACTURE: ICD-10-CM

## 2022-10-21 PROCEDURE — 97110 THERAPEUTIC EXERCISES: CPT | Performed by: PHYSICAL THERAPIST

## 2022-10-21 NOTE — PROGRESS NOTES
Date: 10/21/2022         Dx: Status post open reduction and internal fixation (ORIF) of fracture (Z98.890,Z87.81)  LBP              Authorized # of Visits:  18       Referring MD:  Destin Tirado MD visit: none scheduled    Medication Changes since last visit?: No    Subjective: Harini report that she is feeling pain/ache in the (L) hip while ambulating. She has been doing her HEP on the bed 1-2x/day. She still has difficulty with lifting her (L) LE on the bed. Objective:  (-) Homans (L) LE;  (-) Tenderness (L) length of thigh;  (-) Warmth;    Date: 10/12/2022  Tx#: 2/18 Date: 10/14/2022  Tx#: 3/18 Date: 10/19/2022  Tx#: 4/18 Date: 10/21/2022  Tx#: 5/18 Date: Tx#: 6/ Date:    Tx#: 7/   Ambulation to bed/table x 20 feet with rolling walker; ache (L) LE during stance phase    Supine: (L) LE hamstring and quadricep isometric 10 reps x 10 cts ea; NE    Supine: (L) LE SLR with belt assist 10 reps x 5-10 cts ea; NE    Supine: hooklying with greenTB 10 reps x 10 cts ea; NE    Supine: (B) LE bridge 10 reps x 10 cts ea; NE    Supine to sit; cueing needed to maintain good form and posture    Gait training with a rolling walker in department; no ache/pain (L) LE (better) Ambulation to bed/table then to NuStep 2 x 20 feet with rolling walker; ache (L) LE during stance phase    NuStep LE only L1 x 10 mins; Dec, B    Ambulation to bed/mat less ache/pain (L) hip/thigh    Supine: (L) LE SLR 10+5 reps x 5 cts ea; NE    Supine: (L) hamstring sets 2 x 10 reps x 10 cts ea; NE tired    Supine: (B) LE bridge with hip abduction greenTB resist 10 reps x 10 cts ea; NE    SLY: (L) LE clam redTB 10 reps x 10 cts ea; NE tired    Seated: (L) hamstring curl redTB 10 reps x 10 cts ea; NE tired/cramping    Gait training with a rolling walker in department; P, NW ache/pain (L) LE (better)    PERRI over rail x 10 reps; NE Ambulation with RW with SBA x 60 feet; P, NW (L) hip    PERRI over rail x 10 reps;  NE    No change (L) hip ache during ambulation    NuStep LE L1-2 x 10 mins; P, NW    Supine: (L) LE SLR 10+5 reps x 10 cts ea; NE (R) thigh muscles working    Supine hooklying: bridge 3 reps x 5 cts ea; P, NW (L) hip    Supine hooklying: (B) LE hip abduction green-blueTB 2 x 10 reps x 10 cts ea; NE    Supine to sit transfer Denise/CGA    Seated: (B) LE abduction blueTB resist 10 reps x 10 cts ea; NE    Ambulation with RW with SBA x 25 feet; same ache (L) hip    Shuttle: (B) leg press 3b 2 x 10 reps; NE    PERRI oer rail x 10 reps; NE    Ambulation with RW with SBA x 25 feet less pain (L) hip  Ambulation with RW with SBA x 60 feet; P, NW (L) hip    NuStep LE only L1-2-3 x 10 mins; NE    PERRI over rail x 10 reps; NE    Supine: (L) SLR 10 reps x 10 cts ea; NE     + 1 lb 2 x 5 reps x 5 cts ea; P, NW (sore)    SLY: Clam greenTB 10 reps x 10 cts ea; NE tired    Shuttle: (B) leg press 3b 2 x 10 reps; NE    Shuttle: (L) leg press  2b x 10 reps; NE    Ambulation with RW with SBA x 25 feet; P, NW                   Assessment/HEP:   Judy Innocent was progressed with (L) LE strengthening in supine with a 1 lb weight for SLR and a greenTB for sidelying clams. Added (L) leg press with light resistance no report of pain during the exercise but was tired/fatigued after her session. Goals:   (18 visits)  1. Patient to consistently perform their HEP and it's progression to maintain their improved condition. 2. Patient to have reduced and abolished symptoms to to be able to bend down, rise up from sitting, take the first few steps in the morning, walk, and climb up/donw stairs without producing or increasing symptoms in the (L) groin, lateral hip/buttock, and anterior thigh. 3. Patient to have WNL of (L) Hip AROM in all directions with 4/5 MMT in all motions to promote all home, work, recreational, and functional activities without discomfort or deviation.      Plan:   Re-assess next session and continue with (L) LE ROM, strengthening, stretching, gait training, balance training, and HEP progression. Charges:  TherEx x 3       Total Timed Treatment: 47 mins Total Treatment Time: 48 mins

## 2022-10-25 ENCOUNTER — OFFICE VISIT (OUTPATIENT)
Dept: PHYSICAL THERAPY | Age: 82
End: 2022-10-25
Attending: ORTHOPAEDIC SURGERY
Payer: MEDICARE

## 2022-10-25 PROCEDURE — 97110 THERAPEUTIC EXERCISES: CPT | Performed by: PHYSICAL THERAPIST

## 2022-10-25 NOTE — PROGRESS NOTES
Date: 10/25/2022         Dx: Status post open reduction and internal fixation (ORIF) of fracture (Z98.890,Z87.81)  LBP              Authorized # of Visits:  18       Referring MD:  Leonardo Mejia  Next MD visit: none scheduled    Medication Changes since last visit?: No    Subjective: Harini report that she still feel an ache in the (L) hip when she walk. She only feel it when she places weight on the (L) LE and rates it a 4/10. She has been doing her HEP for the (L) LE regularly. Objective:  (-) Homans (L) LE;  (-) Tenderness (L) length of thigh;  (-) Warmth; 4/10 (L) lateral buttock, upper thigh area upon walking and placing weight on it. Date: 10/12/2022  Tx#: 2/18 Date: 10/14/2022  Tx#: 3/18 Date: 10/19/2022  Tx#: 4/18 Date: 10/21/2022  Tx#: 5/18 Date: 10/25/2022  Tx#: 6/18 Date:    Tx#: 7/   Ambulation to bed/table x 20 feet with rolling walker; ache (L) LE during stance phase    Supine: (L) LE hamstring and quadricep isometric 10 reps x 10 cts ea; NE    Supine: (L) LE SLR with belt assist 10 reps x 5-10 cts ea; NE    Supine: hooklying with greenTB 10 reps x 10 cts ea; NE    Supine: (B) LE bridge 10 reps x 10 cts ea; NE    Supine to sit; cueing needed to maintain good form and posture    Gait training with a rolling walker in department; no ache/pain (L) LE (better) Ambulation to bed/table then to NuStep 2 x 20 feet with rolling walker; ache (L) LE during stance phase    NuStep LE only L1 x 10 mins; Dec, B    Ambulation to bed/mat less ache/pain (L) hip/thigh    Supine: (L) LE SLR 10+5 reps x 5 cts ea; NE    Supine: (L) hamstring sets 2 x 10 reps x 10 cts ea; NE tired    Supine: (B) LE bridge with hip abduction greenTB resist 10 reps x 10 cts ea; NE    SLY: (L) LE clam redTB 10 reps x 10 cts ea; NE tired    Seated: (L) hamstring curl redTB 10 reps x 10 cts ea; NE tired/cramping    Gait training with a rolling walker in department; P, NW ache/pain (L) LE (better)    PERRI over rail x 10 reps; NE Ambulation with RW with SBA x 60 feet; P, NW (L) hip    PERRI over rail x 10 reps;  NE    No change (L) hip ache during ambulation    NuStep LE L1-2 x 10 mins; P, NW    Supine: (L) LE SLR 10+5 reps x 10 cts ea; NE (R) thigh muscles working    Supine hooklying: bridge 3 reps x 5 cts ea; P, NW (L) hip    Supine hooklying: (B) LE hip abduction green-blueTB 2 x 10 reps x 10 cts ea; NE    Supine to sit transfer Denise/CGA    Seated: (B) LE abduction blueTB resist 10 reps x 10 cts ea; NE    Ambulation with RW with SBA x 25 feet; same ache (L) hip    Shuttle: (B) leg press 3b 2 x 10 reps; NE    PERRI oer rail x 10 reps; NE    Ambulation with RW with SBA x 25 feet less pain (L) hip  Ambulation with RW with SBA x 60 feet; P, NW (L) hip    NuStep LE only L1-2-3 x 10 mins; NE    PERRI over rail x 10 reps; NE    Supine: (L) SLR 10 reps x 10 cts ea; NE     + 1 lb 2 x 5 reps x 5 cts ea; P, NW (sore)    SLY: Clam greenTB 10 reps x 10 cts ea; NE tired    Shuttle: (B) leg press 3b 2 x 10 reps; NE    Shuttle: (L) leg press  2b x 10 reps; NE    Ambulation with RW with SBA x 25 feet; P, NW             Ambulation with RW with SBA x 30 feet; P, NW (L) hip    PERRI over rail 2 x 10 reps; P, NW (better) mid back     - less pain (L) hip during walking (1/10)    PERRI over rail x 10 reps more; P, NW (better)     - no pain (L) hip during waking    Seated: (R/L) bicep curl 6 lbs x 10 reps ea; NE     Lateral walk with redTB abd resist and CGA/Denise x 5 feet; tired/nervous    PERRI over rail x 10 reps; NE            Assessment/HEP:   Dwight Bailey reported that the (L) hip ache/pain during ambulation, abolished after doing PERRI over rail (OP) 3 x 10 reps. She was instructed to prioritize this exercise and to perform it 10 reps 3-4x/day. Reinforced posture correction using a lumbar roll. She was was able to perform lateral walking with CGA to Denise but was nervous of falling. She was able to do the exercise safely with cueing.   All questions and concerns were answered and addressed at this time. Goals:   (18 visits)  1. Patient to consistently perform their HEP and it's progression to maintain their improved condition. 2. Patient to have reduced and abolished symptoms to to be able to bend down, rise up from sitting, take the first few steps in the morning, walk, and climb up/donw stairs without producing or increasing symptoms in the (L) groin, lateral hip/buttock, and anterior thigh. 3. Patient to have WNL of (L) Hip AROM in all directions with 4/5 MMT in all motions to promote all home, work, recreational, and functional activities without discomfort or deviation. Plan:   Re-assess next session and continue with (L) LE ROM, strengthening, stretching, gait training, balance training, and HEP progression. Charges:  TherEx x 3       Total Timed Treatment: 46 mins Total Treatment Time: 48 mins

## 2022-10-27 ENCOUNTER — OFFICE VISIT (OUTPATIENT)
Dept: PHYSICAL THERAPY | Age: 82
End: 2022-10-27
Payer: MEDICARE

## 2022-10-27 DIAGNOSIS — Z87.81 STATUS POST OPEN REDUCTION AND INTERNAL FIXATION (ORIF) OF FRACTURE: ICD-10-CM

## 2022-10-27 DIAGNOSIS — Z98.890 STATUS POST OPEN REDUCTION AND INTERNAL FIXATION (ORIF) OF FRACTURE: ICD-10-CM

## 2022-10-27 PROCEDURE — 97110 THERAPEUTIC EXERCISES: CPT | Performed by: PHYSICAL THERAPIST

## 2022-10-27 NOTE — PROGRESS NOTES
Date: 10/27/2022         Dx: Status post open reduction and internal fixation (ORIF) of fracture (Z98.890,Z87.81)  LBP              Authorized # of Visits:  18       Referring MD:  Erika Tirado MD visit: none scheduled    Medication Changes since last visit?: No    Subjective: Harini report that she is in pain in the (L) LE down to the calf. She also feel that her (L) leg like to give out while walking. She has not been doing her HEP (PERRI over counter) for the back regularly. Objective:  (-) Homans (L) LE;  (-) Tenderness (L) length of thigh;  (-) Warmth; 4/10 (L) lateral buttock, upper thigh area upon walking and placing weight on it.       Date: 10/12/2022  Tx#: 2/18 Date: 10/14/2022  Tx#: 3/18 Date: 10/19/2022  Tx#: 4/18 Date: 10/21/2022  Tx#: 5/18 Date: 10/25/2022  Tx#: 6/18 Date: 10/27/2022  Tx#: 7/18   Ambulation to bed/table x 20 feet with rolling walker; ache (L) LE during stance phase    Supine: (L) LE hamstring and quadricep isometric 10 reps x 10 cts ea; NE    Supine: (L) LE SLR with belt assist 10 reps x 5-10 cts ea; NE    Supine: hooklying with greenTB 10 reps x 10 cts ea; NE    Supine: (B) LE bridge 10 reps x 10 cts ea; NE    Supine to sit; cueing needed to maintain good form and posture    Gait training with a rolling walker in department; no ache/pain (L) LE (better) Ambulation to bed/table then to NuStep 2 x 20 feet with rolling walker; ache (L) LE during stance phase    NuStep LE only L1 x 10 mins; Dec, B    Ambulation to bed/mat less ache/pain (L) hip/thigh    Supine: (L) LE SLR 10+5 reps x 5 cts ea; NE    Supine: (L) hamstring sets 2 x 10 reps x 10 cts ea; NE tired    Supine: (B) LE bridge with hip abduction greenTB resist 10 reps x 10 cts ea; NE    SLY: (L) LE clam redTB 10 reps x 10 cts ea; NE tired    Seated: (L) hamstring curl redTB 10 reps x 10 cts ea; NE tired/cramping    Gait training with a rolling walker in department; P, NW ache/pain (L) LE (better)    PERRI over rail x 10 reps; NE Ambulation with RW with SBA x 60 feet; P, NW (L) hip    PERRI over rail x 10 reps;  NE    No change (L) hip ache during ambulation    NuStep LE L1-2 x 10 mins; P, NW    Supine: (L) LE SLR 10+5 reps x 10 cts ea; NE (R) thigh muscles working    Supine hooklying: bridge 3 reps x 5 cts ea; P, NW (L) hip    Supine hooklying: (B) LE hip abduction green-blueTB 2 x 10 reps x 10 cts ea; NE    Supine to sit transfer Denise/CGA    Seated: (B) LE abduction blueTB resist 10 reps x 10 cts ea; NE    Ambulation with RW with SBA x 25 feet; same ache (L) hip    Shuttle: (B) leg press 3b 2 x 10 reps; NE    PERRI oer rail x 10 reps; NE    Ambulation with RW with SBA x 25 feet less pain (L) hip  Ambulation with RW with SBA x 60 feet; P, NW (L) hip    NuStep LE only L1-2-3 x 10 mins; NE    PERRI over rail x 10 reps; NE    Supine: (L) SLR 10 reps x 10 cts ea; NE     + 1 lb 2 x 5 reps x 5 cts ea; P, NW (sore)    SLY: Clam greenTB 10 reps x 10 cts ea; NE tired    Shuttle: (B) leg press 3b 2 x 10 reps; NE    Shuttle: (L) leg press  2b x 10 reps; NE    Ambulation with RW with SBA x 25 feet; P, NW             Ambulation with RW with SBA x 30 feet; P, NW (L) hip    PERRI over rail 2 x 10 reps; P, NW (better) mid back     - less pain (L) hip during walking (1/10)    PERRI over rail x 10 reps more; P, NW (better)     - no pain (L) hip during waking    Seated: (R/L) bicep curl 6 lbs x 10 reps ea; NE     Lateral walk with redTB abd resist and CGA/Denise x 5 feet; tired/nervous    PERRI over rail x 10 reps; NE       Ambulation with RW with SBA x 30 feet; P, NW (L) hip    PERRI over rail 2 x 10 reps; Dec, B      - less pain walking    PERRI over rail x 10 reps more; Dec, B     - lesser pain walking (does not feel like giving out anymore)    Sitting posture correction with lumbar roll x 3 mins; NE (feels good)    Pronelying x 5 mins; Dec, A    Prone to sit to stand; no pain/ache    PERRI over rail x 10 reps; NE    - no pain/ache (L) LE walking Assessment/HEP:   Agata Acosta is presenting with a lumbar derangement with a directional preference toward lumbar extension. (L) LE symptoms of pain to the (L) calf abolished after pronelying and was reduced with PERRI over rail. Reinforced and reviewed with patient the importance of doing her back exercises to relieve her symptoms and to be able to progress (L) LE strengthening, stability, and balance exercises. All questions and concerns were answered and addressed at this time. Recommended patient to use a lumbar roll to sit on a good/supportive chair. Goals:   (18 visits)  1. Patient to consistently perform their HEP and it's progression to maintain their improved condition. 2. Patient to have reduced and abolished symptoms to to be able to bend down, rise up from sitting, take the first few steps in the morning, walk, and climb up/donw stairs without producing or increasing symptoms in the (L) groin, lateral hip/buttock, and anterior thigh. 3. Patient to have WNL of (L) Hip AROM in all directions with 4/5 MMT in all motions to promote all home, work, recreational, and functional activities without discomfort or deviation. Plan:   Re-assess next session and continue with (L) LE ROM, strengthening, stretching, gait training, balance training, and HEP progression. Charges:  TherEx x 3       Total Timed Treatment: 44 mins Total Treatment Time: 47 mins

## 2022-11-02 ENCOUNTER — OFFICE VISIT (OUTPATIENT)
Dept: PHYSICAL THERAPY | Age: 82
End: 2022-11-02
Attending: ORTHOPAEDIC SURGERY
Payer: MEDICARE

## 2022-11-02 PROCEDURE — 97110 THERAPEUTIC EXERCISES: CPT | Performed by: PHYSICAL THERAPIST

## 2022-11-02 NOTE — PROGRESS NOTES
Date: 11/2/2022         Dx: Status post open reduction and internal fixation (ORIF) of fracture (Z98.890,Z87.81)  LBP              Authorized # of Visits:  18       Referring MD:  Phillip Diaz  Next MD visit: none scheduled    Medication Changes since last visit?: No    Subjective: Harini report that her (L) hip is feeling better. There is still pain on the (L) hip while ambulating with less than last time she was here. She state that she is doing her back bending exercises in standing and pronelying in extension at home. She is also able to ambulate with a straighter posture using a rolling walker. Objective:  (-) Homans (L) LE;  (-) Tenderness (L) length of thigh;  (-) Warmth; 0/10 (L) lateral buttock sitting and standing; ache (L) hip walking.       Date: 10/12/2022  Tx#: 2/18 Date: 10/14/2022  Tx#: 3/18 Date: 10/19/2022  Tx#: 4/18 Date: 10/21/2022  Tx#: 5/18 Date: 10/25/2022  Tx#: 6/18 Date: 10/27/2022  Tx#: 7/18   Ambulation to bed/table x 20 feet with rolling walker; ache (L) LE during stance phase    Supine: (L) LE hamstring and quadricep isometric 10 reps x 10 cts ea; NE    Supine: (L) LE SLR with belt assist 10 reps x 5-10 cts ea; NE    Supine: hooklying with greenTB 10 reps x 10 cts ea; NE    Supine: (B) LE bridge 10 reps x 10 cts ea; NE    Supine to sit; cueing needed to maintain good form and posture    Gait training with a rolling walker in department; no ache/pain (L) LE (better) Ambulation to bed/table then to NuStep 2 x 20 feet with rolling walker; ache (L) LE during stance phase    NuStep LE only L1 x 10 mins; Dec, B    Ambulation to bed/mat less ache/pain (L) hip/thigh    Supine: (L) LE SLR 10+5 reps x 5 cts ea; NE    Supine: (L) hamstring sets 2 x 10 reps x 10 cts ea; NE tired    Supine: (B) LE bridge with hip abduction greenTB resist 10 reps x 10 cts ea; NE    SLY: (L) LE clam redTB 10 reps x 10 cts ea; NE tired    Seated: (L) hamstring curl redTB 10 reps x 10 cts ea; NE tired/cramping    Gait training with a rolling walker in department; P, NW ache/pain (L) LE (better)    PERRI over rail x 10 reps; NE Ambulation with RW with SBA x 60 feet; P, NW (L) hip    PERRI over rail x 10 reps;  NE    No change (L) hip ache during ambulation    NuStep LE L1-2 x 10 mins; P, NW    Supine: (L) LE SLR 10+5 reps x 10 cts ea; NE (R) thigh muscles working    Supine hooklying: bridge 3 reps x 5 cts ea; P, NW (L) hip    Supine hooklying: (B) LE hip abduction green-blueTB 2 x 10 reps x 10 cts ea; NE    Supine to sit transfer Denise/CGA    Seated: (B) LE abduction blueTB resist 10 reps x 10 cts ea; NE    Ambulation with RW with SBA x 25 feet; same ache (L) hip    Shuttle: (B) leg press 3b 2 x 10 reps; NE    PERRI oer rail x 10 reps; NE    Ambulation with RW with SBA x 25 feet less pain (L) hip  Ambulation with RW with SBA x 60 feet; P, NW (L) hip    NuStep LE only L1-2-3 x 10 mins; NE    EPRRI over rail x 10 reps; NE    Supine: (L) SLR 10 reps x 10 cts ea; NE     + 1 lb 2 x 5 reps x 5 cts ea; P, NW (sore)    SLY: Clam greenTB 10 reps x 10 cts ea; NE tired    Shuttle: (B) leg press 3b 2 x 10 reps; NE    Shuttle: (L) leg press  2b x 10 reps; NE    Ambulation with RW with SBA x 25 feet; P, NW             Ambulation with RW with SBA x 30 feet; P, NW (L) hip    PERRI over rail 2 x 10 reps; P, NW (better) mid back     - less pain (L) hip during walking (1/10)    PERRI over rail x 10 reps more; P, NW (better)     - no pain (L) hip during waking    Seated: (R/L) bicep curl 6 lbs x 10 reps ea; NE     Lateral walk with redTB abd resist and CGA/Denise x 5 feet; tired/nervous    PERRI over rail x 10 reps; NE       Ambulation with RW with SBA x 30 feet; P, NW (L) hip    PERRI over rail 2 x 10 reps; Dec, B      - less pain walking    PERRI over rail x 10 reps more; Dec, B     - lesser pain walking (does not feel like giving out anymore)    Sitting posture correction with lumbar roll x 3 mins; NE (feels good)    Pronelying x 5 mins; Dec, A    Prone to sit to stand; no pain/ache    PERRI over rail x 10 reps; NE    - no pain/ache (L) LE walking            Date: 11/2/2022  Tx#: 8/18        Ambulation with RW with SBA x 60 feet; P, NW (L) hip    NuStep LE only L2-3-4 x 10 mins; NE    PERRI over rail x 10 reps; NE    Pronelying x 5 mins; NE    Prone: (L) knee flexion x 10+7 reps; NE    Prone: (L) hip extension 10 reps x 2 cts ea; NE    Step up onto a 6 inch step with (L) LE lead 2 x 10 reps; NE    Ambulation with RS with SBA/Mod I x 60 feet; NE          Assessment/HEP:   Harini continue to respond to lumbar extension directional preference exercise in standing and pronelying. She reported no pain in the (L) LE after the exercise even during ambulation. All questions and concerns were answered and addressed at this time. Goals:   (18 visits)  1. Patient to consistently perform their HEP and it's progression to maintain their improved condition. 2. Patient to have reduced and abolished symptoms to to be able to bend down, rise up from sitting, take the first few steps in the morning, walk, and climb up/donw stairs without producing or increasing symptoms in the (L) groin, lateral hip/buttock, and anterior thigh. 3. Patient to have WNL of (L) Hip AROM in all directions with 4/5 MMT in all motions to promote all home, work, recreational, and functional activities without discomfort or deviation. Plan:   Re-assess next session and continue with (L) LE ROM, strengthening, stretching, gait training, balance training, and HEP progression. Charges:  TherEx x 3       Total Timed Treatment: 45 mins Total Treatment Time: 46 mins

## 2022-11-04 ENCOUNTER — APPOINTMENT (OUTPATIENT)
Dept: PHYSICAL THERAPY | Age: 82
End: 2022-11-04
Attending: ORTHOPAEDIC SURGERY
Payer: MEDICARE

## 2022-11-04 ENCOUNTER — TELEPHONE (OUTPATIENT)
Dept: PHYSICAL THERAPY | Facility: HOSPITAL | Age: 82
End: 2022-11-04

## 2022-11-09 ENCOUNTER — OFFICE VISIT (OUTPATIENT)
Dept: PHYSICAL THERAPY | Age: 82
End: 2022-11-09
Attending: ORTHOPAEDIC SURGERY
Payer: MEDICARE

## 2022-11-09 PROCEDURE — 97110 THERAPEUTIC EXERCISES: CPT | Performed by: PHYSICAL THERAPIST

## 2022-11-09 NOTE — PROGRESS NOTES
Date: 11/9/2022         Dx: Status post open reduction and internal fixation (ORIF) of fracture (Z98.890,Z87.81)  LBP              Authorized # of Visits:  18       Referring MD:  Sabrina Tirado MD visit: none scheduled    Medication Changes since last visit?: No    Subjective: Harini report that she has been sick (diarrhea) the past few days and have been feeling weak she has not been able to do any of her exercises since. She has been sitting on her chair most of the time. She is feeling pain/ache in the (L) hip at this time. Objective:  (-) Homans (L) LE;  (-) Tenderness (L) length of thigh;  (-) Warmth; 0/10 (L) lateral buttock sitting and standing; ache (L) hip walking.       Date: 10/12/2022  Tx#: 2/18 Date: 10/14/2022  Tx#: 3/18 Date: 10/19/2022  Tx#: 4/18 Date: 10/21/2022  Tx#: 5/18 Date: 10/25/2022  Tx#: 6/18 Date: 10/27/2022  Tx#: 7/18   Ambulation to bed/table x 20 feet with rolling walker; ache (L) LE during stance phase    Supine: (L) LE hamstring and quadricep isometric 10 reps x 10 cts ea; NE    Supine: (L) LE SLR with belt assist 10 reps x 5-10 cts ea; NE    Supine: hooklying with greenTB 10 reps x 10 cts ea; NE    Supine: (B) LE bridge 10 reps x 10 cts ea; NE    Supine to sit; cueing needed to maintain good form and posture    Gait training with a rolling walker in department; no ache/pain (L) LE (better) Ambulation to bed/table then to NuStep 2 x 20 feet with rolling walker; ache (L) LE during stance phase    NuStep LE only L1 x 10 mins; Dec, B    Ambulation to bed/mat less ache/pain (L) hip/thigh    Supine: (L) LE SLR 10+5 reps x 5 cts ea; NE    Supine: (L) hamstring sets 2 x 10 reps x 10 cts ea; NE tired    Supine: (B) LE bridge with hip abduction greenTB resist 10 reps x 10 cts ea; NE    SLY: (L) LE clam redTB 10 reps x 10 cts ea; NE tired    Seated: (L) hamstring curl redTB 10 reps x 10 cts ea; NE tired/cramping    Gait training with a rolling walker in department; P, NW ache/pain (L) LE (better)    PERRI over rail x 10 reps; NE Ambulation with RW with SBA x 60 feet; P, NW (L) hip    PERRI over rail x 10 reps;  NE    No change (L) hip ache during ambulation    NuStep LE L1-2 x 10 mins; P, NW    Supine: (L) LE SLR 10+5 reps x 10 cts ea; NE (R) thigh muscles working    Supine hooklying: bridge 3 reps x 5 cts ea; P, NW (L) hip    Supine hooklying: (B) LE hip abduction green-blueTB 2 x 10 reps x 10 cts ea; NE    Supine to sit transfer Denise/CGA    Seated: (B) LE abduction blueTB resist 10 reps x 10 cts ea; NE    Ambulation with RW with SBA x 25 feet; same ache (L) hip    Shuttle: (B) leg press 3b 2 x 10 reps; NE    PERRI oer rail x 10 reps; NE    Ambulation with RW with SBA x 25 feet less pain (L) hip  Ambulation with RW with SBA x 60 feet; P, NW (L) hip    NuStep LE only L1-2-3 x 10 mins; NE    PERRI over rail x 10 reps; NE    Supine: (L) SLR 10 reps x 10 cts ea; NE     + 1 lb 2 x 5 reps x 5 cts ea; P, NW (sore)    SLY: Clam greenTB 10 reps x 10 cts ea; NE tired    Shuttle: (B) leg press 3b 2 x 10 reps; NE    Shuttle: (L) leg press  2b x 10 reps; NE    Ambulation with RW with SBA x 25 feet; P, NW             Ambulation with RW with SBA x 30 feet; P, NW (L) hip    PERRI over rail 2 x 10 reps; P, NW (better) mid back     - less pain (L) hip during walking (1/10)    PERRI over rail x 10 reps more; P, NW (better)     - no pain (L) hip during waking    Seated: (R/L) bicep curl 6 lbs x 10 reps ea; NE     Lateral walk with redTB abd resist and CGA/Denise x 5 feet; tired/nervous    PERRI over rail x 10 reps; NE       Ambulation with RW with SBA x 30 feet; P, NW (L) hip    PERRI over rail 2 x 10 reps; Dec, B      - less pain walking    PERRI over rail x 10 reps more; Dec, B     - lesser pain walking (does not feel like giving out anymore)    Sitting posture correction with lumbar roll x 3 mins; NE (feels good)    Pronelying x 5 mins; Dec, A    Prone to sit to stand; no pain/ache    PERRI over rail x 10 reps; NE    - no pain/ache (L) LE walking            Date: 11/2/2022  Tx#: 8/18 Date: 11/9/2022  Tx#: 9/18       Ambulation with RW with SBA x 60 feet; P, NW (L) hip    NuStep LE only L2-3-4 x 10 mins; NE    PERRI over rail x 10 reps; NE    Pronelying x 5 mins; NE    Prone: (L) knee flexion x 10+7 reps; NE    Prone: (L) hip extension 10 reps x 2 cts ea; NE    Step up onto a 6 inch step with (L) LE lead 2 x 10 reps; NE    Ambulation with RS with SBA/Mod I x 60 feet; NE Ambulation with RW with SBA x 60 feet; P, NW (L) hip    NuStep LE only L2-3-4 x 10 mins; NE    PERRI over rail x 10 reps; Dec, NB    Pronelying x 3 mins; Dec, A (L) hip pain    DARRIN x 2 mins; NE (feels good in the (L) hip)    Step up onto a 4 inch step (L) LE lead with (R) hand rail support x 10 reps; NE    Shuttle: (L) LE leg press 3b 2 x 20 reps; NE             Assessment/HEP:   Rayray Rowley was restarted with her exercises with less resistance to slowly return her to moving. She has been sitting on a chair the past few days and she is reported stiffness all over her body. She continue to present relief of (L) hip symptoms with pronelying positioning. She required moderate cueing to promote good form and posture. Goals:   (18 visits)  1. Patient to consistently perform their HEP and it's progression to maintain their improved condition. 2. Patient to have reduced and abolished symptoms to to be able to bend down, rise up from sitting, take the first few steps in the morning, walk, and climb up/donw stairs without producing or increasing symptoms in the (L) groin, lateral hip/buttock, and anterior thigh. 3. Patient to have WNL of (L) Hip AROM in all directions with 4/5 MMT in all motions to promote all home, work, recreational, and functional activities without discomfort or deviation. Plan:   Re-assess next session and continue with (L) LE ROM, strengthening, stretching, gait training, balance training, and HEP progression. Charges:  TherEx x 3       Total Timed Treatment: 43 mins Total Treatment Time: 45 mins

## 2022-11-11 ENCOUNTER — OFFICE VISIT (OUTPATIENT)
Dept: PHYSICAL THERAPY | Age: 82
End: 2022-11-11
Attending: ORTHOPAEDIC SURGERY
Payer: MEDICARE

## 2022-11-11 PROCEDURE — 97110 THERAPEUTIC EXERCISES: CPT | Performed by: PHYSICAL THERAPIST

## 2022-11-11 NOTE — PROGRESS NOTES
Date: 11/11/2022         Dx: Status post open reduction and internal fixation (ORIF) of fracture (Z98.890,Z87.81)  LBP              Authorized # of Visits:  18       Referring MD:  Sarai Garcia  Next MD visit: none scheduled    Medication Changes since last visit?: No    Subjective: Mitesh Alexandra report that she has pain in the (L) hip when she walk and place weight on it rated about 7/10. She is feeling neck pain and back pain as well. She still has difficulty lifting her (L) LE onto the bed. She state that she tries to do her HEP in bed and also she is trying to sit up straighter. Objective:  (-) Homans (L) LE;  (-) Tenderness (L) length of thigh;  (-) Warmth; 0/10 (L) lateral buttock sitting and standing; ache (L) hip walking.       Date: 10/12/2022  Tx#: 2/18 Date: 10/14/2022  Tx#: 3/18 Date: 10/19/2022  Tx#: 4/18 Date: 10/21/2022  Tx#: 5/18 Date: 10/25/2022  Tx#: 6/18 Date: 10/27/2022  Tx#: 7/18   Ambulation to bed/table x 20 feet with rolling walker; ache (L) LE during stance phase    Supine: (L) LE hamstring and quadricep isometric 10 reps x 10 cts ea; NE    Supine: (L) LE SLR with belt assist 10 reps x 5-10 cts ea; NE    Supine: hooklying with greenTB 10 reps x 10 cts ea; NE    Supine: (B) LE bridge 10 reps x 10 cts ea; NE    Supine to sit; cueing needed to maintain good form and posture    Gait training with a rolling walker in department; no ache/pain (L) LE (better) Ambulation to bed/table then to NuStep 2 x 20 feet with rolling walker; ache (L) LE during stance phase    NuStep LE only L1 x 10 mins; Dec, B    Ambulation to bed/mat less ache/pain (L) hip/thigh    Supine: (L) LE SLR 10+5 reps x 5 cts ea; NE    Supine: (L) hamstring sets 2 x 10 reps x 10 cts ea; NE tired    Supine: (B) LE bridge with hip abduction greenTB resist 10 reps x 10 cts ea; NE    SLY: (L) LE clam redTB 10 reps x 10 cts ea; NE tired    Seated: (L) hamstring curl redTB 10 reps x 10 cts ea; NE tired/cramping    Gait training with a rolling walker in department; P, NW ache/pain (L) LE (better)    PERRI over rail x 10 reps; NE Ambulation with RW with SBA x 60 feet; P, NW (L) hip    PERRI over rail x 10 reps;  NE    No change (L) hip ache during ambulation    NuStep LE L1-2 x 10 mins; P, NW    Supine: (L) LE SLR 10+5 reps x 10 cts ea; NE (R) thigh muscles working    Supine hooklying: bridge 3 reps x 5 cts ea; P, NW (L) hip    Supine hooklying: (B) LE hip abduction green-blueTB 2 x 10 reps x 10 cts ea; NE    Supine to sit transfer Denise/CGA    Seated: (B) LE abduction blueTB resist 10 reps x 10 cts ea; NE    Ambulation with RW with SBA x 25 feet; same ache (L) hip    Shuttle: (B) leg press 3b 2 x 10 reps; NE    PERRI oer rail x 10 reps; NE    Ambulation with RW with SBA x 25 feet less pain (L) hip  Ambulation with RW with SBA x 60 feet; P, NW (L) hip    NuStep LE only L1-2-3 x 10 mins; NE    PERRI over rail x 10 reps; NE    Supine: (L) SLR 10 reps x 10 cts ea; NE     + 1 lb 2 x 5 reps x 5 cts ea; P, NW (sore)    SLY: Clam greenTB 10 reps x 10 cts ea; NE tired    Shuttle: (B) leg press 3b 2 x 10 reps; NE    Shuttle: (L) leg press  2b x 10 reps; NE    Ambulation with RW with SBA x 25 feet; P, NW             Ambulation with RW with SBA x 30 feet; P, NW (L) hip    PERRI over rail 2 x 10 reps; P, NW (better) mid back     - less pain (L) hip during walking (1/10)    PERRI over rail x 10 reps more; P, NW (better)     - no pain (L) hip during waking    Seated: (R/L) bicep curl 6 lbs x 10 reps ea; NE     Lateral walk with redTB abd resist and CGA/Denise x 5 feet; tired/nervous    PERRI over rail x 10 reps; NE       Ambulation with RW with SBA x 30 feet; P, NW (L) hip    PERRI over rail 2 x 10 reps; Dec, B      - less pain walking    PERRI over rail x 10 reps more; Dec, B     - lesser pain walking (does not feel like giving out anymore)    Sitting posture correction with lumbar roll x 3 mins; NE (feels good)    Pronelying x 5 mins; Dec, A    Prone to sit to stand; no pain/ache    PERRI over rail x 10 reps; NE    - no pain/ache (L) LE walking            Date: 11/2/2022  Tx#: 8/18 Date: 11/9/2022  Tx#: 9/18 Date: 11/11/2022  Tx#: 10/18      Ambulation with RW with SBA x 60 feet; P, NW (L) hip    NuStep LE only L2-3-4 x 10 mins; NE    PERRI over rail x 10 reps; NE    Pronelying x 5 mins; NE    Prone: (L) knee flexion x 10+7 reps; NE    Prone: (L) hip extension 10 reps x 2 cts ea; NE    Step up onto a 6 inch step with (L) LE lead 2 x 10 reps; NE    Ambulation with RS with SBA/Mod I x 60 feet; NE Ambulation with RW with SBA x 60 feet; P, NW (L) hip    NuStep LE only L2-3-4 x 10 mins; NE    PERRI over rail x 10 reps; Dec, NB    Pronelying x 3 mins; Dec, A (L) hip pain    DARRIN x 2 mins; NE (feels good in the (L) hip)    Step up onto a 4 inch step (L) LE lead with (R) hand rail support x 10 reps; NE    Shuttle: (L) LE leg press 3b 2 x 20 reps; NE     Scifit UE only L1 fwd/bkwd x 5 mins ea; NE    PERRI over rail x 10 reps; NE    Supine: (L) SLR 1 lb 2 x 10 reps x 10 cts ea; P, NW    Supine: (B) LE bridge with greenTB abd resist 10 reps x 10 cts ea; NE    Step up onto a 6 inch step (L) LE lead x 10 reps; NE    PERRI over rail x 10 reps; NE        Assessment/HEP:   Salome Wadsworth reported less to no pain in the (L) hip during walking after her exercises. She was able to step up onto a 6 inch step with 2 rail support and CGA. Cueing needed to maintain good alignment and maintain a wider KEKE. She was reminded to do her supine (L) LE strengthening more at home. Goals:   (18 visits)  1. Patient to consistently perform their HEP and it's progression to maintain their improved condition. 2. Patient to have reduced and abolished symptoms to to be able to bend down, rise up from sitting, take the first few steps in the morning, walk, and climb up/donw stairs without producing or increasing symptoms in the (L) groin, lateral hip/buttock, and anterior thigh.    3. Patient to have WNL of (L) Hip AROM in all directions with 4/5 MMT in all motions to promote all home, work, recreational, and functional activities without discomfort or deviation. Plan:   Re-assess next session and continue with (L) LE ROM, strengthening, stretching, gait training, balance training, and HEP progression. Charges:  TherEx x 3       Total Timed Treatment: 47 mins Total Treatment Time: 49 mins

## 2022-11-16 ENCOUNTER — OFFICE VISIT (OUTPATIENT)
Dept: PHYSICAL THERAPY | Age: 82
End: 2022-11-16
Attending: ORTHOPAEDIC SURGERY
Payer: MEDICARE

## 2022-11-16 PROCEDURE — 97110 THERAPEUTIC EXERCISES: CPT | Performed by: PHYSICAL THERAPIST

## 2022-11-16 NOTE — PROGRESS NOTES
Date: 11/16/2022         Dx: Status post open reduction and internal fixation (ORIF) of fracture (Z98.890,Z87.81)  LBP              Authorized # of Visits:  18       Referring MD:  Marcy Ledesma  Next MD visit: none scheduled    Medication Changes since last visit?: No    Subjective: Harini report that her (L) hip is still achy but when she does her exercise it does not hurt as much. She is also trying sit up straighter and do her back bending. Objective:  (-) Homans (L) LE;  (-) Tenderness (L) length of thigh;  (-) Warmth; 0/10 (L) lateral buttock sitting and standing; ache (L) hip walking.       Date: 10/12/2022  Tx#: 2/18 Date: 10/14/2022  Tx#: 3/18 Date: 10/19/2022  Tx#: 4/18 Date: 10/21/2022  Tx#: 5/18 Date: 10/25/2022  Tx#: 6/18 Date: 10/27/2022  Tx#: 7/18   Ambulation to bed/table x 20 feet with rolling walker; ache (L) LE during stance phase    Supine: (L) LE hamstring and quadricep isometric 10 reps x 10 cts ea; NE    Supine: (L) LE SLR with belt assist 10 reps x 5-10 cts ea; NE    Supine: hooklying with greenTB 10 reps x 10 cts ea; NE    Supine: (B) LE bridge 10 reps x 10 cts ea; NE    Supine to sit; cueing needed to maintain good form and posture    Gait training with a rolling walker in department; no ache/pain (L) LE (better) Ambulation to bed/table then to NuStep 2 x 20 feet with rolling walker; ache (L) LE during stance phase    NuStep LE only L1 x 10 mins; Dec, B    Ambulation to bed/mat less ache/pain (L) hip/thigh    Supine: (L) LE SLR 10+5 reps x 5 cts ea; NE    Supine: (L) hamstring sets 2 x 10 reps x 10 cts ea; NE tired    Supine: (B) LE bridge with hip abduction greenTB resist 10 reps x 10 cts ea; NE    SLY: (L) LE clam redTB 10 reps x 10 cts ea; NE tired    Seated: (L) hamstring curl redTB 10 reps x 10 cts ea; NE tired/cramping    Gait training with a rolling walker in department; P, NW ache/pain (L) LE (better)    PERRI over rail x 10 reps; NE Ambulation with RW with SBA x 60 feet; P, NW (L) hip    PERRI over rail x 10 reps;  NE    No change (L) hip ache during ambulation    NuStep LE L1-2 x 10 mins; P, NW    Supine: (L) LE SLR 10+5 reps x 10 cts ea; NE (R) thigh muscles working    Supine hooklying: bridge 3 reps x 5 cts ea; P, NW (L) hip    Supine hooklying: (B) LE hip abduction green-blueTB 2 x 10 reps x 10 cts ea; NE    Supine to sit transfer Denise/CGA    Seated: (B) LE abduction blueTB resist 10 reps x 10 cts ea; NE    Ambulation with RW with SBA x 25 feet; same ache (L) hip    Shuttle: (B) leg press 3b 2 x 10 reps; NE    PERRI oer rail x 10 reps; NE    Ambulation with RW with SBA x 25 feet less pain (L) hip  Ambulation with RW with SBA x 60 feet; P, NW (L) hip    NuStep LE only L1-2-3 x 10 mins; NE    PERRI over rail x 10 reps; NE    Supine: (L) SLR 10 reps x 10 cts ea; NE     + 1 lb 2 x 5 reps x 5 cts ea; P, NW (sore)    SLY: Clam greenTB 10 reps x 10 cts ea; NE tired    Shuttle: (B) leg press 3b 2 x 10 reps; NE    Shuttle: (L) leg press  2b x 10 reps; NE    Ambulation with RW with SBA x 25 feet; P, NW             Ambulation with RW with SBA x 30 feet; P, NW (L) hip    PERRI over rail 2 x 10 reps; P, NW (better) mid back     - less pain (L) hip during walking (1/10)    PERRI over rail x 10 reps more; P, NW (better)     - no pain (L) hip during waking    Seated: (R/L) bicep curl 6 lbs x 10 reps ea; NE     Lateral walk with redTB abd resist and CGA/Denise x 5 feet; tired/nervous    PERRI over rail x 10 reps; NE       Ambulation with RW with SBA x 30 feet; P, NW (L) hip    PERRI over rail 2 x 10 reps; Dec, B      - less pain walking    PERRI over rail x 10 reps more; Dec, B     - lesser pain walking (does not feel like giving out anymore)    Sitting posture correction with lumbar roll x 3 mins; NE (feels good)    Pronelying x 5 mins; Dec, A    Prone to sit to stand; no pain/ache    PERRI over rail x 10 reps; NE    - no pain/ache (L) LE walking            Date: 11/2/2022  Tx#: 8/18 Date: 11/9/2022  Tx#: 9/18 Date: 11/11/2022  Tx#: 10/18 Date: 11/16/2022  Tx#: 11/18     Ambulation with RW with SBA x 60 feet; P, NW (L) hip    NuStep LE only L2-3-4 x 10 mins; NE    PERRI over rail x 10 reps; NE    Pronelying x 5 mins; NE    Prone: (L) knee flexion x 10+7 reps; NE    Prone: (L) hip extension 10 reps x 2 cts ea; NE    Step up onto a 6 inch step with (L) LE lead 2 x 10 reps; NE    Ambulation with RW with SBA/Mod I x 60 feet; NE Ambulation with RW with SBA x 60 feet; P, NW (L) hip    NuStep LE only L2-3-4 x 10 mins; NE    PERRI over rail x 10 reps; Dec, NB    Pronelying x 3 mins; Dec, A (L) hip pain    DARRIN x 2 mins; NE (feels good in the (L) hip)    Step up onto a 4 inch step (L) LE lead with (R) hand rail support x 10 reps; NE    Shuttle: (L) LE leg press 3b 2 x 20 reps; NE     Scifit UE only L1 fwd/bkwd x 5 mins ea; NE    PERRI over rail x 10 reps; NE    Supine: (L) SLR 1 lb 2 x 10 reps x 10 cts ea; P, NW    Supine: (B) LE bridge with greenTB abd resist 10 reps x 10 cts ea; NE    Step up onto a 6 inch step (L) LE lead x 10 reps; NE    PERRI over rail x 10 reps; NE Ambulation with RW with SBA x 60 feet; P, NW (L) hip    NuStep LE only L2-3 x 10 mins; NE    Supine: (L) SLR 3 lbs 2 x 10 reps x 10 cts ea; P, NW (less painful)    Supine: (B) LE bridge with blueTB abd resist 10 reps x 10 cts ea; NE    Step up onto a 6 inch step (L) LE lead 2 x 10 reps; NE    Ambulation with RW with SBA/Mod I x 60 feet; NE    PERRI over rail x 10 reps; Dec, NB    Shuttle: (L) leg press 3b x 20+               Assessment/HEP:   Alvin Pena felt less pain in the (L) hip after her session but was tired. She required minimal cueing to correct posture while ambulating and (L) knee-toe alignment during exercises. She was also cued to place more weight on the (L) LE during walking, exercising, and standing. She was reminded to do her HEP (supine exercises) regularly at home. Goals:   (18 visits)  1.  Patient to consistently perform their HEP and it's progression to maintain their improved condition. 2. Patient to have reduced and abolished symptoms to to be able to bend down, rise up from sitting, take the first few steps in the morning, walk, and climb up/donw stairs without producing or increasing symptoms in the (L) groin, lateral hip/buttock, and anterior thigh. 3. Patient to have WNL of (L) Hip AROM in all directions with 4/5 MMT in all motions to promote all home, work, recreational, and functional activities without discomfort or deviation. Plan:   Re-assess next session and continue with (L) LE ROM, strengthening, stretching, gait training, balance training, and HEP progression. Charges:  TherEx x 4       Total Timed Treatment: 56 mins Total Treatment Time: 58 mins

## 2022-11-18 ENCOUNTER — OFFICE VISIT (OUTPATIENT)
Dept: PHYSICAL THERAPY | Age: 82
End: 2022-11-18
Attending: ORTHOPAEDIC SURGERY
Payer: MEDICARE

## 2022-11-18 PROCEDURE — 97110 THERAPEUTIC EXERCISES: CPT | Performed by: PHYSICAL THERAPIST

## 2022-11-18 NOTE — PROGRESS NOTES
Date: 11/18/2022         Dx: Status post open reduction and internal fixation (ORIF) of fracture (Z98.890,Z87.81)  LBP              Authorized # of Visits:  18       Referring MD:  Ashley Emery  Next MD visit: none scheduled    Medication Changes since last visit?: No    Subjective: Harini report that her (L) hip still bothers her specially when she walks. \"It hurts exactly were the break happened\". She continue to do her HEP regularly. Objective:  (-) Homans (L) LE;  (-) Tenderness (L) length of thigh;  (-) Warmth; 0/10 (L) lateral buttock sitting and standing; ache (L) hip walking.       Date: 10/12/2022  Tx#: 2/18 Date: 10/14/2022  Tx#: 3/18 Date: 10/19/2022  Tx#: 4/18 Date: 10/21/2022  Tx#: 5/18 Date: 10/25/2022  Tx#: 6/18 Date: 10/27/2022  Tx#: 7/18   Ambulation to bed/table x 20 feet with rolling walker; ache (L) LE during stance phase    Supine: (L) LE hamstring and quadricep isometric 10 reps x 10 cts ea; NE    Supine: (L) LE SLR with belt assist 10 reps x 5-10 cts ea; NE    Supine: hooklying with greenTB 10 reps x 10 cts ea; NE    Supine: (B) LE bridge 10 reps x 10 cts ea; NE    Supine to sit; cueing needed to maintain good form and posture    Gait training with a rolling walker in department; no ache/pain (L) LE (better) Ambulation to bed/table then to NuStep 2 x 20 feet with rolling walker; ache (L) LE during stance phase    NuStep LE only L1 x 10 mins; Dec, B    Ambulation to bed/mat less ache/pain (L) hip/thigh    Supine: (L) LE SLR 10+5 reps x 5 cts ea; NE    Supine: (L) hamstring sets 2 x 10 reps x 10 cts ea; NE tired    Supine: (B) LE bridge with hip abduction greenTB resist 10 reps x 10 cts ea; NE    SLY: (L) LE clam redTB 10 reps x 10 cts ea; NE tired    Seated: (L) hamstring curl redTB 10 reps x 10 cts ea; NE tired/cramping    Gait training with a rolling walker in department; P, NW ache/pain (L) LE (better)    PERRI over rail x 10 reps; NE Ambulation with RW with SBA x 60 feet; P, NW (L) hip    PERRI over rail x 10 reps;  NE    No change (L) hip ache during ambulation    NuStep LE L1-2 x 10 mins; P, NW    Supine: (L) LE SLR 10+5 reps x 10 cts ea; NE (R) thigh muscles working    Supine hooklying: bridge 3 reps x 5 cts ea; P, NW (L) hip    Supine hooklying: (B) LE hip abduction green-blueTB 2 x 10 reps x 10 cts ea; NE    Supine to sit transfer Denise/CGA    Seated: (B) LE abduction blueTB resist 10 reps x 10 cts ea; NE    Ambulation with RW with SBA x 25 feet; same ache (L) hip    Shuttle: (B) leg press 3b 2 x 10 reps; NE    PERRI oer rail x 10 reps; NE    Ambulation with RW with SBA x 25 feet less pain (L) hip  Ambulation with RW with SBA x 60 feet; P, NW (L) hip    NuStep LE only L1-2-3 x 10 mins; NE    PERRI over rail x 10 reps; NE    Supine: (L) SLR 10 reps x 10 cts ea; NE     + 1 lb 2 x 5 reps x 5 cts ea; P, NW (sore)    SLY: Clam greenTB 10 reps x 10 cts ea; NE tired    Shuttle: (B) leg press 3b 2 x 10 reps; NE    Shuttle: (L) leg press  2b x 10 reps; NE    Ambulation with RW with SBA x 25 feet; P, NW             Ambulation with RW with SBA x 30 feet; P, NW (L) hip    PERRI over rail 2 x 10 reps; P, NW (better) mid back     - less pain (L) hip during walking (1/10)    PERRI over rail x 10 reps more; P, NW (better)     - no pain (L) hip during waking    Seated: (R/L) bicep curl 6 lbs x 10 reps ea; NE     Lateral walk with redTB abd resist and CGA/Denise x 5 feet; tired/nervous    PERRI over rail x 10 reps; NE       Ambulation with RW with SBA x 30 feet; P, NW (L) hip    PERRI over rail 2 x 10 reps; Dec, B      - less pain walking    PERRI over rail x 10 reps more; Dec, B     - lesser pain walking (does not feel like giving out anymore)    Sitting posture correction with lumbar roll x 3 mins; NE (feels good)    Pronelying x 5 mins; Dec, A    Prone to sit to stand; no pain/ache    PERRI over rail x 10 reps; NE    - no pain/ache (L) LE walking            Date: 11/2/2022  Tx#: 8/18 Date: 11/9/2022  Tx#: 9/18 Date: 11/11/2022  Tx#: 10/18 Date: 11/16/2022  Tx#: 11/18 Date: 11/18/2022  Tx#: 12/18    Ambulation with RW with SBA x 60 feet; P, NW (L) hip    NuStep LE only L2-3-4 x 10 mins; NE    PERRI over rail x 10 reps; NE    Pronelying x 5 mins; NE    Prone: (L) knee flexion x 10+7 reps; NE    Prone: (L) hip extension 10 reps x 2 cts ea; NE    Step up onto a 6 inch step with (L) LE lead 2 x 10 reps; NE    Ambulation with RW with SBA/Mod I x 60 feet; NE Ambulation with RW with SBA x 60 feet; P, NW (L) hip    NuStep LE only L2-3-4 x 10 mins; NE    PERRI over rail x 10 reps; Dec, NB    Pronelying x 3 mins; Dec, A (L) hip pain    DARRIN x 2 mins; NE (feels good in the (L) hip)    Step up onto a 4 inch step (L) LE lead with (R) hand rail support x 10 reps; NE    Shuttle: (L) LE leg press 3b 2 x 20 reps; NE     Scifit UE only L1 fwd/bkwd x 5 mins ea; NE    PERRI over rail x 10 reps; NE    Supine: (L) SLR 1 lb 2 x 10 reps x 10 cts ea; P, NW    Supine: (B) LE bridge with greenTB abd resist 10 reps x 10 cts ea; NE    Step up onto a 6 inch step (L) LE lead x 10 reps; NE    PERRI over rail x 10 reps; NE Ambulation with RW with SBA x 60 feet; P, NW (L) hip    NuStep LE only L2-3 x 10 mins; NE    Supine: (L) SLR 3 lbs 2 x 10 reps x 10 cts ea; P, NW (less painful)    Supine: (B) LE bridge with blueTB abd resist 10 reps x 10 cts ea; NE    Step up onto a 6 inch step (L) LE lead 2 x 10 reps; NE    Ambulation with RW with SBA/Mod I x 60 feet; NE    PERRI over rail x 10 reps; Dec, NB    Shuttle: (L) leg press 3b x 20+         Ambulation with RW with SBA x 60 feet; P, NW (L) hip    NuStep LE only L2-3-4-6-2 x 10 mins; NE    PERRI over rail x 10 reps; NE      Supine: (L) SLR 3 lbs 2 x 10 reps x 10 cts ea; P, NW (less painful)    Supine: (B) LE bridge with blueTB abd resist 10 reps x 10 cts ea; NE    Supine hooklying:  Alt LE knee lift x 10 reps; P, NW    Step up onto a 6 inch step (L) LE lead 2 x 10 reps; NE    PERRI over rail x 10 reps; NE Assessment/HEP:   Harini report a little more ache in the (L) hip after her session. She was able to do added alt knee lift in supine hooklying. She was reminded to always keep good sitting posture using a lumbar roll. (L) Hip has WFL of AROM with endrange ache wit 3+/5 MMT in all motions. Goals:   (18 visits)  1. Patient to consistently perform their HEP and it's progression to maintain their improved condition. 2. Patient to have reduced and abolished symptoms to to be able to bend down, rise up from sitting, take the first few steps in the morning, walk, and climb up/donw stairs without producing or increasing symptoms in the (L) groin, lateral hip/buttock, and anterior thigh. 3. Patient to have WNL of (L) Hip AROM in all directions with 4/5 MMT in all motions to promote all home, work, recreational, and functional activities without discomfort or deviation. Plan:   Re-assess next session and continue with (L) LE ROM, strengthening, stretching, gait training, balance training, and HEP progression. Charges:  TherEx x 4       Total Timed Treatment: 54 mins Total Treatment Time: 57 mins

## 2022-11-29 ENCOUNTER — OFFICE VISIT (OUTPATIENT)
Dept: PHYSICAL THERAPY | Age: 82
End: 2022-11-29
Attending: ORTHOPAEDIC SURGERY
Payer: MEDICARE

## 2022-11-29 PROCEDURE — 97110 THERAPEUTIC EXERCISES: CPT | Performed by: PHYSICAL THERAPIST

## 2022-11-29 NOTE — PROGRESS NOTES
Date: 11/29/2022         Dx: Status post open reduction and internal fixation (ORIF) of fracture (Z98.890,Z87.81)  LBP              Authorized # of Visits:  18       Referring MD:  Raleigh Tirado MD visit: none scheduled    Medication Changes since last visit?: No    Subjective: Harini report that she is still feeling pain in the (L) hip when she walks but not as painful as before. He went up a few steps at their place with a rail support and someone with her leading with the (L) leg. Objective:  (-) Homans (L) LE;  (-) Tenderness (L) length of thigh;  (-) Warmth; 0/10 (L) lateral buttock sitting and standing; ache (L) hip walking.       Date: 10/12/2022  Tx#: 2/18 Date: 10/14/2022  Tx#: 3/18 Date: 10/19/2022  Tx#: 4/18 Date: 10/21/2022  Tx#: 5/18 Date: 10/25/2022  Tx#: 6/18 Date: 10/27/2022  Tx#: 7/18   Ambulation to bed/table x 20 feet with rolling walker; ache (L) LE during stance phase    Supine: (L) LE hamstring and quadricep isometric 10 reps x 10 cts ea; NE    Supine: (L) LE SLR with belt assist 10 reps x 5-10 cts ea; NE    Supine: hooklying with greenTB 10 reps x 10 cts ea; NE    Supine: (B) LE bridge 10 reps x 10 cts ea; NE    Supine to sit; cueing needed to maintain good form and posture    Gait training with a rolling walker in department; no ache/pain (L) LE (better) Ambulation to bed/table then to NuStep 2 x 20 feet with rolling walker; ache (L) LE during stance phase    NuStep LE only L1 x 10 mins; Dec, B    Ambulation to bed/mat less ache/pain (L) hip/thigh    Supine: (L) LE SLR 10+5 reps x 5 cts ea; NE    Supine: (L) hamstring sets 2 x 10 reps x 10 cts ea; NE tired    Supine: (B) LE bridge with hip abduction greenTB resist 10 reps x 10 cts ea; NE    SLY: (L) LE clam redTB 10 reps x 10 cts ea; NE tired    Seated: (L) hamstring curl redTB 10 reps x 10 cts ea; NE tired/cramping    Gait training with a rolling walker in department; P, NW ache/pain (L) LE (better)    PERRI over rail x 10 reps; NE Ambulation with RW with SBA x 60 feet; P, NW (L) hip    PERRI over rail x 10 reps;  NE    No change (L) hip ache during ambulation    NuStep LE L1-2 x 10 mins; P, NW    Supine: (L) LE SLR 10+5 reps x 10 cts ea; NE (R) thigh muscles working    Supine hooklying: bridge 3 reps x 5 cts ea; P, NW (L) hip    Supine hooklying: (B) LE hip abduction green-blueTB 2 x 10 reps x 10 cts ea; NE    Supine to sit transfer Denise/CGA    Seated: (B) LE abduction blueTB resist 10 reps x 10 cts ea; NE    Ambulation with RW with SBA x 25 feet; same ache (L) hip    Shuttle: (B) leg press 3b 2 x 10 reps; NE    PERRI oer rail x 10 reps; NE    Ambulation with RW with SBA x 25 feet less pain (L) hip  Ambulation with RW with SBA x 60 feet; P, NW (L) hip    NuStep LE only L1-2-3 x 10 mins; NE    PERRI over rail x 10 reps; NE    Supine: (L) SLR 10 reps x 10 cts ea; NE     + 1 lb 2 x 5 reps x 5 cts ea; P, NW (sore)    SLY: Clam greenTB 10 reps x 10 cts ea; NE tired    Shuttle: (B) leg press 3b 2 x 10 reps; NE    Shuttle: (L) leg press  2b x 10 reps; NE    Ambulation with RW with SBA x 25 feet; P, NW             Ambulation with RW with SBA x 30 feet; P, NW (L) hip    PERRI over rail 2 x 10 reps; P, NW (better) mid back     - less pain (L) hip during walking (1/10)    PERRI over rail x 10 reps more; P, NW (better)     - no pain (L) hip during waking    Seated: (R/L) bicep curl 6 lbs x 10 reps ea; NE     Lateral walk with redTB abd resist and CGA/Denise x 5 feet; tired/nervous    PERRI over rail x 10 reps; NE       Ambulation with RW with SBA x 30 feet; P, NW (L) hip    PERRI over rail 2 x 10 reps; Dec, B      - less pain walking    PERRI over rail x 10 reps more; Dec, B     - lesser pain walking (does not feel like giving out anymore)    Sitting posture correction with lumbar roll x 3 mins; NE (feels good)    Pronelying x 5 mins; Dec, A    Prone to sit to stand; no pain/ache    PERRI over rail x 10 reps; NE    - no pain/ache (L) LE walking            Date: 11/2/2022  Tx#: 8/18 Date: 11/9/2022  Tx#: 9/18 Date: 11/11/2022  Tx#: 10/18 Date: 11/16/2022  Tx#: 11/18 Date: 11/18/2022  Tx#: 12/18 Date: 11/29/2022  Tx#: 13/18   Ambulation with RW with SBA x 60 feet; P, NW (L) hip    NuStep LE only L2-3-4 x 10 mins; NE    PERRI over rail x 10 reps; NE    Pronelying x 5 mins; NE    Prone: (L) knee flexion x 10+7 reps; NE    Prone: (L) hip extension 10 reps x 2 cts ea; NE    Step up onto a 6 inch step with (L) LE lead 2 x 10 reps; NE    Ambulation with RW with SBA/Mod I x 60 feet; NE Ambulation with RW with SBA x 60 feet; P, NW (L) hip    NuStep LE only L2-3-4 x 10 mins; NE    PERRI over rail x 10 reps; Dec, NB    Pronelying x 3 mins; Dec, A (L) hip pain    DARRIN x 2 mins; NE (feels good in the (L) hip)    Step up onto a 4 inch step (L) LE lead with (R) hand rail support x 10 reps; NE    Shuttle: (L) LE leg press 3b 2 x 20 reps; NE     Scifit UE only L1 fwd/bkwd x 5 mins ea; NE    PERRI over rail x 10 reps; NE    Supine: (L) SLR 1 lb 2 x 10 reps x 10 cts ea; P, NW    Supine: (B) LE bridge with greenTB abd resist 10 reps x 10 cts ea; NE    Step up onto a 6 inch step (L) LE lead x 10 reps; NE    PERRI over rail x 10 reps; NE Ambulation with RW with SBA x 60 feet; P, NW (L) hip    NuStep LE only L2-3 x 10 mins; NE    Supine: (L) SLR 3 lbs 2 x 10 reps x 10 cts ea; P, NW (less painful)    Supine: (B) LE bridge with blueTB abd resist 10 reps x 10 cts ea; NE    Step up onto a 6 inch step (L) LE lead 2 x 10 reps; NE    Ambulation with RW with SBA/Mod I x 60 feet; NE    PERRI over rail x 10 reps; Dec, NB    Shuttle: (L) leg press 3b x 20+         Ambulation with RW with SBA x 60 feet; P, NW (L) hip    NuStep LE only L2-3-4-6-2 x 10 mins; NE    PERRI over rail x 10 reps; NE      Supine: (L) SLR 3 lbs 2 x 10 reps x 10 cts ea; P, NW (less painful)    Supine: (B) LE bridge with blueTB abd resist 10 reps x 10 cts ea; NE    Supine hooklying:  Alt LE knee lift x 10 reps; P, NW    Step up onto a 6 inch step (L) LE lead 2 x 10 reps; NE    PERRI over rail x 10 reps; NE   Ambulation with RW with SBA x 60 feet; P, NW (L) hip    NuStep LE only L2-3-4-6-2 x 10 mins; NE    Shuttle: (B) LE leg press 5b 2 x 20 reps; NE    Shuttle: (L) LE leg press 4b x 20 reps; NE    Gait training with RW with SBA x 60+30 feet     Step up onto a 6 inch step (L) LE lead with 2 lbs 2 x 10 reps; NE    Hydrant: (L) LE 1 lb ball 6 x 5 cts ea; NE tired    PERRI over rail x 10 reps; NE                        Assessment/HEP:   Peder Light continue to report pain in the (L) hip upon ambulating but no worse as a result. She progressed today with heavier resistance during step up onto a 6 inch step and shuttle leg press exercises. Added (L) LE hip flexion and abduction exercises I standing using 1 lb ball. She is able to lift the (L) LE easier on/off machines and step. All questions and concerns were answered and addressed at this time. Goals:   (18 visits)  1. Patient to consistently perform their HEP and it's progression to maintain their improved condition. 2. Patient to have reduced and abolished symptoms to to be able to bend down, rise up from sitting, take the first few steps in the morning, walk, and climb up/donw stairs without producing or increasing symptoms in the (L) groin, lateral hip/buttock, and anterior thigh. 3. Patient to have WNL of (L) Hip AROM in all directions with 4/5 MMT in all motions to promote all home, work, recreational, and functional activities without discomfort or deviation. Plan:   Re-assess next session and continue with (L) LE ROM, strengthening, stretching, gait training, balance training, and HEP progression. Charges:  TherEx x 4       Total Timed Treatment: 57 mins Total Treatment Time: 58 mins

## 2022-12-01 ENCOUNTER — OFFICE VISIT (OUTPATIENT)
Dept: PHYSICAL THERAPY | Age: 82
End: 2022-12-01
Attending: ORTHOPAEDIC SURGERY
Payer: MEDICARE

## 2022-12-01 PROCEDURE — 97110 THERAPEUTIC EXERCISES: CPT | Performed by: PHYSICAL THERAPIST

## 2022-12-01 NOTE — PROGRESS NOTES
Date: 12/1/2022         Dx: Status post open reduction and internal fixation (ORIF) of fracture (Z98.890,Z87.81)  LBP              Authorized # of Visits:  18       Referring MD:  Joseph Resendiz  Next MD visit: none scheduled    Medication Changes since last visit?: No    Subjective: Timothy Portillo report that she was tired/fatigued after her last session. Today she feels better but the (L) leg still feels tired. She will try to do her exercises as much as she can. Objective:  (-) Homans (L) LE;  (-) Tenderness (L) length of thigh;  (-) Warmth; 0/10 (L) lateral buttock sitting and standing; ache (L) hip walking.       Date: 10/12/2022  Tx#: 2/18 Date: 10/14/2022  Tx#: 3/18 Date: 10/19/2022  Tx#: 4/18 Date: 10/21/2022  Tx#: 5/18 Date: 10/25/2022  Tx#: 6/18 Date: 10/27/2022  Tx#: 7/18   Ambulation to bed/table x 20 feet with rolling walker; ache (L) LE during stance phase    Supine: (L) LE hamstring and quadricep isometric 10 reps x 10 cts ea; NE    Supine: (L) LE SLR with belt assist 10 reps x 5-10 cts ea; NE    Supine: hooklying with greenTB 10 reps x 10 cts ea; NE    Supine: (B) LE bridge 10 reps x 10 cts ea; NE    Supine to sit; cueing needed to maintain good form and posture    Gait training with a rolling walker in department; no ache/pain (L) LE (better) Ambulation to bed/table then to NuStep 2 x 20 feet with rolling walker; ache (L) LE during stance phase    NuStep LE only L1 x 10 mins; Dec, B    Ambulation to bed/mat less ache/pain (L) hip/thigh    Supine: (L) LE SLR 10+5 reps x 5 cts ea; NE    Supine: (L) hamstring sets 2 x 10 reps x 10 cts ea; NE tired    Supine: (B) LE bridge with hip abduction greenTB resist 10 reps x 10 cts ea; NE    SLY: (L) LE clam redTB 10 reps x 10 cts ea; NE tired    Seated: (L) hamstring curl redTB 10 reps x 10 cts ea; NE tired/cramping    Gait training with a rolling walker in department; P, NW ache/pain (L) LE (better)    PERRI over rail x 10 reps; NE Ambulation with RW with SBA x 60 feet; P, NW (L) hip    PERRI over rail x 10 reps;  NE    No change (L) hip ache during ambulation    NuStep LE L1-2 x 10 mins; P, NW    Supine: (L) LE SLR 10+5 reps x 10 cts ea; NE (R) thigh muscles working    Supine hooklying: bridge 3 reps x 5 cts ea; P, NW (L) hip    Supine hooklying: (B) LE hip abduction green-blueTB 2 x 10 reps x 10 cts ea; NE    Supine to sit transfer Denise/CGA    Seated: (B) LE abduction blueTB resist 10 reps x 10 cts ea; NE    Ambulation with RW with SBA x 25 feet; same ache (L) hip    Shuttle: (B) leg press 3b 2 x 10 reps; NE    PERRI oer rail x 10 reps; NE    Ambulation with RW with SBA x 25 feet less pain (L) hip  Ambulation with RW with SBA x 60 feet; P, NW (L) hip    NuStep LE only L1-2-3 x 10 mins; NE    PERRI over rail x 10 reps; NE    Supine: (L) SLR 10 reps x 10 cts ea; NE     + 1 lb 2 x 5 reps x 5 cts ea; P, NW (sore)    SLY: Clam greenTB 10 reps x 10 cts ea; NE tired    Shuttle: (B) leg press 3b 2 x 10 reps; NE    Shuttle: (L) leg press  2b x 10 reps; NE    Ambulation with RW with SBA x 25 feet; P, NW             Ambulation with RW with SBA x 30 feet; P, NW (L) hip    PERRI over rail 2 x 10 reps; P, NW (better) mid back     - less pain (L) hip during walking (1/10)    PERRI over rail x 10 reps more; P, NW (better)     - no pain (L) hip during waking    Seated: (R/L) bicep curl 6 lbs x 10 reps ea; NE     Lateral walk with redTB abd resist and CGA/Denise x 5 feet; tired/nervous    PERRI over rail x 10 reps; NE       Ambulation with RW with SBA x 30 feet; P, NW (L) hip    PERRI over rail 2 x 10 reps; Dec, B      - less pain walking    PERRI over rail x 10 reps more; Dec, B     - lesser pain walking (does not feel like giving out anymore)    Sitting posture correction with lumbar roll x 3 mins; NE (feels good)    Pronelying x 5 mins; Dec, A    Prone to sit to stand; no pain/ache    PERRI over rail x 10 reps; NE    - no pain/ache (L) LE walking            Date: 11/2/2022  Tx#: 8/18 Date: 11/9/2022  Tx#: 9/18 Date: 11/11/2022  Tx#: 10/18 Date: 11/16/2022  Tx#: 11/18 Date: 11/18/2022  Tx#: 12/18 Date: 11/29/2022  Tx#: 13/18   Ambulation with RW with SBA x 60 feet; P, NW (L) hip    NuStep LE only L2-3-4 x 10 mins; NE    PERRI over rail x 10 reps; NE    Pronelying x 5 mins; NE    Prone: (L) knee flexion x 10+7 reps; NE    Prone: (L) hip extension 10 reps x 2 cts ea; NE    Step up onto a 6 inch step with (L) LE lead 2 x 10 reps; NE    Ambulation with RW with SBA/Mod I x 60 feet; NE Ambulation with RW with SBA x 60 feet; P, NW (L) hip    NuStep LE only L2-3-4 x 10 mins; NE    PERRI over rail x 10 reps; Dec, NB    Pronelying x 3 mins; Dec, A (L) hip pain    DARRIN x 2 mins; NE (feels good in the (L) hip)    Step up onto a 4 inch step (L) LE lead with (R) hand rail support x 10 reps; NE    Shuttle: (L) LE leg press 3b 2 x 20 reps; NE     Scifit UE only L1 fwd/bkwd x 5 mins ea; NE    PERRI over rail x 10 reps; NE    Supine: (L) SLR 1 lb 2 x 10 reps x 10 cts ea; P, NW    Supine: (B) LE bridge with greenTB abd resist 10 reps x 10 cts ea; NE    Step up onto a 6 inch step (L) LE lead x 10 reps; NE    PERRI over rail x 10 reps; NE Ambulation with RW with SBA x 60 feet; P, NW (L) hip    NuStep LE only L2-3 x 10 mins; NE    Supine: (L) SLR 3 lbs 2 x 10 reps x 10 cts ea; P, NW (less painful)    Supine: (B) LE bridge with blueTB abd resist 10 reps x 10 cts ea; NE    Step up onto a 6 inch step (L) LE lead 2 x 10 reps; NE    Ambulation with RW with SBA/Mod I x 60 feet; NE    PERRI over rail x 10 reps; Dec, NB    Shuttle: (L) leg press 3b x 20+         Ambulation with RW with SBA x 60 feet; P, NW (L) hip    NuStep LE only L2-3-4-6-2 x 10 mins; NE    PERRI over rail x 10 reps; NE      Supine: (L) SLR 3 lbs 2 x 10 reps x 10 cts ea; P, NW (less painful)    Supine: (B) LE bridge with blueTB abd resist 10 reps x 10 cts ea; NE    Supine hooklying:  Alt LE knee lift x 10 reps; P, NW    Step up onto a 6 inch step (L) LE lead 2 x 10 reps; NE    PERRI over rail x 10 reps; NE   Ambulation with RW with SBA x 60 feet; P, NW (L) hip    NuStep LE only L2-3-4-6-2 x 10 mins; NE    Shuttle: (B) LE leg press 5b 2 x 20 reps; NE    Shuttle: (L) LE leg press 4b x 20 reps; NE    Gait training with RW with SBA x 60+30 feet     Step up onto a 6 inch step (L) LE lead with 2 lbs 2 x 10 reps; NE    Hydrant: (L) LE 1 lb ball 6 x 5 cts ea; NE tired    PERRI over rail x 10 reps; NE                        Date:12/1/202  Tx#: 14/18       NuStep LE only L2-3-4-6-2 x 10 mins; NE    Shuttle: (B) LE leg press 5b 2 x 20 reps; NE    Shuttle: (L) LE leg press 3b 2 x 20 reps; NE    Gait training with RW with SBA x 60+30 feet     Step up onto a 6 inch step (L) LE lead with 2 lbs 2 x 10 reps; NE    Seated: (L/R) repeated knee extension 10 reps x 5-10 cts ea; NE tired    PERRI over rail x 10 reps;          Assessment/HEP:   Harini continue to perform (L) LE strengthening and stability exercises with cueing to correct alignment and promote safety. She was again tired and fatigued after her session but she was glad that she was still able to workout good today without producing (L) hip pain. Issued copy of seated (R/L) knee extension exercise. All questions and concerns were answered and addressed at this time. Goals:   (18 visits)  1. Patient to consistently perform their HEP and it's progression to maintain their improved condition. 2. Patient to have reduced and abolished symptoms to to be able to bend down, rise up from sitting, take the first few steps in the morning, walk, and climb up/donw stairs without producing or increasing symptoms in the (L) groin, lateral hip/buttock, and anterior thigh. 3. Patient to have WNL of (L) Hip AROM in all directions with 4/5 MMT in all motions to promote all home, work, recreational, and functional activities without discomfort or deviation.      Plan:   Re-assess next session and continue with (L) LE ROM, strengthening, stretching, gait training, balance training, and HEP progression. Charges:  TherEx x 4       Total Timed Treatment: 54 mins Total Treatment Time: 55 mins

## 2022-12-07 ENCOUNTER — OFFICE VISIT (OUTPATIENT)
Dept: PHYSICAL THERAPY | Age: 82
End: 2022-12-07
Attending: ORTHOPAEDIC SURGERY
Payer: MEDICARE

## 2022-12-07 PROCEDURE — 97110 THERAPEUTIC EXERCISES: CPT | Performed by: PHYSICAL THERAPIST

## 2022-12-07 NOTE — PROGRESS NOTES
Date: 12/7/2022         Dx: Status post open reduction and internal fixation (ORIF) of fracture (Z98.890,Z87.81)  LBP              Authorized # of Visits:  18       Referring MD:  Kareen Tirado MD visit: none scheduled    Medication Changes since last visit?: No    Subjective: Harini report that she still feel the pain in the (L) hip when she walks. She has been using a rolling walker while walking and she has been going up stairs with rail assist and Barbara Rosalesl () with her. She goes up one step at a time but occasionally she would go up reciprocally. Objective:  (-) Homans (L) LE;  (-) Tenderness (L) length of thigh;  (-) Warmth; 0/10 (L) lateral buttock sitting and standing; ache (L) hip walking.       Date: 10/12/2022  Tx#: 2/18 Date: 10/14/2022  Tx#: 3/18 Date: 10/19/2022  Tx#: 4/18 Date: 10/21/2022  Tx#: 5/18 Date: 10/25/2022  Tx#: 6/18 Date: 10/27/2022  Tx#: 7/18   Ambulation to bed/table x 20 feet with rolling walker; ache (L) LE during stance phase    Supine: (L) LE hamstring and quadricep isometric 10 reps x 10 cts ea; NE    Supine: (L) LE SLR with belt assist 10 reps x 5-10 cts ea; NE    Supine: hooklying with greenTB 10 reps x 10 cts ea; NE    Supine: (B) LE bridge 10 reps x 10 cts ea; NE    Supine to sit; cueing needed to maintain good form and posture    Gait training with a rolling walker in department; no ache/pain (L) LE (better) Ambulation to bed/table then to NuStep 2 x 20 feet with rolling walker; ache (L) LE during stance phase    NuStep LE only L1 x 10 mins; Dec, B    Ambulation to bed/mat less ache/pain (L) hip/thigh    Supine: (L) LE SLR 10+5 reps x 5 cts ea; NE    Supine: (L) hamstring sets 2 x 10 reps x 10 cts ea; NE tired    Supine: (B) LE bridge with hip abduction greenTB resist 10 reps x 10 cts ea; NE    SLY: (L) LE clam redTB 10 reps x 10 cts ea; NE tired    Seated: (L) hamstring curl redTB 10 reps x 10 cts ea; NE tired/cramping    Gait training with a rolling walker in department; P, NW ache/pain (L) LE (better)    PERRI over rail x 10 reps; NE Ambulation with RW with SBA x 60 feet; P, NW (L) hip    PERRI over rail x 10 reps;  NE    No change (L) hip ache during ambulation    NuStep LE L1-2 x 10 mins; P, NW    Supine: (L) LE SLR 10+5 reps x 10 cts ea; NE (R) thigh muscles working    Supine hooklying: bridge 3 reps x 5 cts ea; P, NW (L) hip    Supine hooklying: (B) LE hip abduction green-blueTB 2 x 10 reps x 10 cts ea; NE    Supine to sit transfer Denise/CGA    Seated: (B) LE abduction blueTB resist 10 reps x 10 cts ea; NE    Ambulation with RW with SBA x 25 feet; same ache (L) hip    Shuttle: (B) leg press 3b 2 x 10 reps; NE    PERRI oer rail x 10 reps; NE    Ambulation with RW with SBA x 25 feet less pain (L) hip  Ambulation with RW with SBA x 60 feet; P, NW (L) hip    NuStep LE only L1-2-3 x 10 mins; NE    PERRI over rail x 10 reps; NE    Supine: (L) SLR 10 reps x 10 cts ea; NE     + 1 lb 2 x 5 reps x 5 cts ea; P, NW (sore)    SLY: Clam greenTB 10 reps x 10 cts ea; NE tired    Shuttle: (B) leg press 3b 2 x 10 reps; NE    Shuttle: (L) leg press  2b x 10 reps; NE    Ambulation with RW with SBA x 25 feet; P, NW             Ambulation with RW with SBA x 30 feet; P, NW (L) hip    PERRI over rail 2 x 10 reps; P, NW (better) mid back     - less pain (L) hip during walking (1/10)    PERRI over rail x 10 reps more; P, NW (better)     - no pain (L) hip during waking    Seated: (R/L) bicep curl 6 lbs x 10 reps ea; NE     Lateral walk with redTB abd resist and CGA/Denise x 5 feet; tired/nervous    PERRI over rail x 10 reps; NE       Ambulation with RW with SBA x 30 feet; P, NW (L) hip    PERRI over rail 2 x 10 reps; Dec, B      - less pain walking    PERRI over rail x 10 reps more; Dec, B     - lesser pain walking (does not feel like giving out anymore)    Sitting posture correction with lumbar roll x 3 mins; NE (feels good)    Pronelying x 5 mins; Dec, A    Prone to sit to stand; no pain/ache    PERRI over rail x 10 reps; NE    - no pain/ache (L) LE walking            Date: 11/2/2022  Tx#: 8/18 Date: 11/9/2022  Tx#: 9/18 Date: 11/11/2022  Tx#: 10/18 Date: 11/16/2022  Tx#: 11/18 Date: 11/18/2022  Tx#: 12/18 Date: 11/29/2022  Tx#: 13/18   Ambulation with RW with SBA x 60 feet; P, NW (L) hip    NuStep LE only L2-3-4 x 10 mins; NE    PERRI over rail x 10 reps; NE    Pronelying x 5 mins; NE    Prone: (L) knee flexion x 10+7 reps; NE    Prone: (L) hip extension 10 reps x 2 cts ea; NE    Step up onto a 6 inch step with (L) LE lead 2 x 10 reps; NE    Ambulation with RW with SBA/Mod I x 60 feet; NE Ambulation with RW with SBA x 60 feet; P, NW (L) hip    NuStep LE only L2-3-4 x 10 mins; NE    PERRI over rail x 10 reps; Dec, NB    Pronelying x 3 mins; Dec, A (L) hip pain    DARRIN x 2 mins; NE (feels good in the (L) hip)    Step up onto a 4 inch step (L) LE lead with (R) hand rail support x 10 reps; NE    Shuttle: (L) LE leg press 3b 2 x 20 reps; NE     Scifit UE only L1 fwd/bkwd x 5 mins ea; NE    PERRI over rail x 10 reps; NE    Supine: (L) SLR 1 lb 2 x 10 reps x 10 cts ea; P, NW    Supine: (B) LE bridge with greenTB abd resist 10 reps x 10 cts ea; NE    Step up onto a 6 inch step (L) LE lead x 10 reps; NE    PERRI over rail x 10 reps; NE Ambulation with RW with SBA x 60 feet; P, NW (L) hip    NuStep LE only L2-3 x 10 mins; NE    Supine: (L) SLR 3 lbs 2 x 10 reps x 10 cts ea; P, NW (less painful)    Supine: (B) LE bridge with blueTB abd resist 10 reps x 10 cts ea; NE    Step up onto a 6 inch step (L) LE lead 2 x 10 reps; NE    Ambulation with RW with SBA/Mod I x 60 feet; NE    PERRI over rail x 10 reps; Dec, NB    Shuttle: (L) leg press 3b x 20+         Ambulation with RW with SBA x 60 feet; P, NW (L) hip    NuStep LE only L2-3-4-6-2 x 10 mins; NE    PERRI over rail x 10 reps; NE      Supine: (L) SLR 3 lbs 2 x 10 reps x 10 cts ea; P, NW (less painful)    Supine: (B) LE bridge with blueTB abd resist 10 reps x 10 cts ea; NE    Supine hooklying: Alt LE knee lift x 10 reps; P, NW    Step up onto a 6 inch step (L) LE lead 2 x 10 reps; NE    PERRI over rail x 10 reps; NE   Ambulation with RW with SBA x 60 feet; P, NW (L) hip    NuStep LE only L2-3-4-6-2 x 10 mins; NE    Shuttle: (B) LE leg press 5b 2 x 20 reps; NE    Shuttle: (L) LE leg press 4b x 20 reps; NE    Gait training with RW with SBA x 60+30 feet     Step up onto a 6 inch step (L) LE lead with 2 lbs 2 x 10 reps; NE    Hydrant: (L) LE 1 lb ball 6 x 5 cts ea; NE tired    PERRI over rail x 10 reps; NE                        Date:12/1/202  Tx#: 14/18 Date: 12/7/2022  Tx#: 15/18      NuStep LE only L2-3-4-6-2 x 10 mins; NE    Shuttle: (B) LE leg press 5b 2 x 20 reps; NE    Shuttle: (L) LE leg press 3b 2 x 20 reps; NE    Gait training with RW with SBA x 60+30 feet     Step up onto a 6 inch step (L) LE lead with 2 lbs 2 x 10 reps; NE    Seated: (L/R) repeated knee extension 10 reps x 5-10 cts ea; NE tired    PERRI over rail x 10 reps; NE NuStep LE only L2-3-4-5-2 x 10 mins; NE    Shuttle: (B) LE leg press 6b 2 x 20 reps; NE    Shuttle: (L) LE leg press 4b 2 x 10 reps; NE    Gait training with RW with SBA x 30+30+30 feet     Step up onto a 6 inch step (L) LE lead with 4 lbs 2 x 10 reps; NE tired    Seated: (R/L) bicep curls    PERRI over rail x 10 reps; NE        Assessment/HEP:   Harini continue to perform (L) LE strengthening and stability exercises with cueing for safety and posture correction. She did not report any pain in the (L) hip during exercises and after her session. Goals:   (18 visits)  1. Patient to consistently perform their HEP and it's progression to maintain their improved condition. 2. Patient to have reduced and abolished symptoms to to be able to bend down, rise up from sitting, take the first few steps in the morning, walk, and climb up/donw stairs without producing or increasing symptoms in the (L) groin, lateral hip/buttock, and anterior thigh.    3. Patient to have WNL of (L) Hip AROM in all directions with 4/5 MMT in all motions to promote all home, work, recreational, and functional activities without discomfort or deviation. Plan:   Re-assess next session and continue with (L) LE ROM, strengthening, stretching, gait training, balance training, and HEP progression. Charges:  TherEx x 4       Total Timed Treatment: 53 mins Total Treatment Time: 54 mins

## 2022-12-09 ENCOUNTER — OFFICE VISIT (OUTPATIENT)
Dept: PHYSICAL THERAPY | Age: 82
End: 2022-12-09
Attending: ORTHOPAEDIC SURGERY
Payer: MEDICARE

## 2022-12-09 PROCEDURE — 97110 THERAPEUTIC EXERCISES: CPT | Performed by: PHYSICAL THERAPIST

## 2022-12-09 NOTE — PROGRESS NOTES
Date: 12/9/2022         Dx: Status post open reduction and internal fixation (ORIF) of fracture (Z98.890,Z87.81)  LBP              Authorized # of Visits:  18 + 12 (Rx: 12/8/2022) = 30        Referring MD:  Doug Marie  Next MD visit: none scheduled    Medication Changes since last visit?: No    Subjective: Harini report that she still feel the pain in the (L) hip when she walks. She saw the MD yesterday and was given an added Rx for 12 more PT sessions. She has been her HEP regularly at home. Objective:  (-) Homans (L) LE;  (-) Tenderness (L) length of thigh;  (-) Warmth; 0/10 (L) lateral buttock sitting and standing; ache (L) hip walking.       Date: 10/12/2022  Tx#: 2/18 Date: 10/14/2022  Tx#: 3/18 Date: 10/19/2022  Tx#: 4/18 Date: 10/21/2022  Tx#: 5/18 Date: 10/25/2022  Tx#: 6/18 Date: 10/27/2022  Tx#: 7/18   Ambulation to bed/table x 20 feet with rolling walker; ache (L) LE during stance phase    Supine: (L) LE hamstring and quadricep isometric 10 reps x 10 cts ea; NE    Supine: (L) LE SLR with belt assist 10 reps x 5-10 cts ea; NE    Supine: hooklying with greenTB 10 reps x 10 cts ea; NE    Supine: (B) LE bridge 10 reps x 10 cts ea; NE    Supine to sit; cueing needed to maintain good form and posture    Gait training with a rolling walker in department; no ache/pain (L) LE (better) Ambulation to bed/table then to NuStep 2 x 20 feet with rolling walker; ache (L) LE during stance phase    NuStep LE only L1 x 10 mins; Dec, B    Ambulation to bed/mat less ache/pain (L) hip/thigh    Supine: (L) LE SLR 10+5 reps x 5 cts ea; NE    Supine: (L) hamstring sets 2 x 10 reps x 10 cts ea; NE tired    Supine: (B) LE bridge with hip abduction greenTB resist 10 reps x 10 cts ea; NE    SLY: (L) LE clam redTB 10 reps x 10 cts ea; NE tired    Seated: (L) hamstring curl redTB 10 reps x 10 cts ea; NE tired/cramping    Gait training with a rolling walker in department; P, NW ache/pain (L) LE (better)    PERRI over rail x 10 reps; NE Ambulation with RW with SBA x 60 feet; P, NW (L) hip    PERRI over rail x 10 reps;  NE    No change (L) hip ache during ambulation    NuStep LE L1-2 x 10 mins; P, NW    Supine: (L) LE SLR 10+5 reps x 10 cts ea; NE (R) thigh muscles working    Supine hooklying: bridge 3 reps x 5 cts ea; P, NW (L) hip    Supine hooklying: (B) LE hip abduction green-blueTB 2 x 10 reps x 10 cts ea; NE    Supine to sit transfer Denise/CGA    Seated: (B) LE abduction blueTB resist 10 reps x 10 cts ea; NE    Ambulation with RW with SBA x 25 feet; same ache (L) hip    Shuttle: (B) leg press 3b 2 x 10 reps; NE    PERRI oer rail x 10 reps; NE    Ambulation with RW with SBA x 25 feet less pain (L) hip  Ambulation with RW with SBA x 60 feet; P, NW (L) hip    NuStep LE only L1-2-3 x 10 mins; NE    PERRI over rail x 10 reps; NE    Supine: (L) SLR 10 reps x 10 cts ea; NE     + 1 lb 2 x 5 reps x 5 cts ea; P, NW (sore)    SLY: Clam greenTB 10 reps x 10 cts ea; NE tired    Shuttle: (B) leg press 3b 2 x 10 reps; NE    Shuttle: (L) leg press  2b x 10 reps; NE    Ambulation with RW with SBA x 25 feet; P, NW             Ambulation with RW with SBA x 30 feet; P, NW (L) hip    PERRI over rail 2 x 10 reps; P, NW (better) mid back     - less pain (L) hip during walking (1/10)    PERRI over rail x 10 reps more; P, NW (better)     - no pain (L) hip during waking    Seated: (R/L) bicep curl 6 lbs x 10 reps ea; NE     Lateral walk with redTB abd resist and CGA/Denise x 5 feet; tired/nervous    PERRI over rail x 10 reps; NE       Ambulation with RW with SBA x 30 feet; P, NW (L) hip    PERRI over rail 2 x 10 reps; Dec, B      - less pain walking    PERRI over rail x 10 reps more; Dec, B     - lesser pain walking (does not feel like giving out anymore)    Sitting posture correction with lumbar roll x 3 mins; NE (feels good)    Pronelying x 5 mins; Dec, A    Prone to sit to stand; no pain/ache    PERRI over rail x 10 reps; NE    - no pain/ache (L) LE walking Date: 11/2/2022  Tx#: 8/18 Date: 11/9/2022  Tx#: 9/18 Date: 11/11/2022  Tx#: 10/18 Date: 11/16/2022  Tx#: 11/18 Date: 11/18/2022  Tx#: 12/18 Date: 11/29/2022  Tx#: 13/18   Ambulation with RW with SBA x 60 feet; P, NW (L) hip    NuStep LE only L2-3-4 x 10 mins; NE    PERRI over rail x 10 reps; NE    Pronelying x 5 mins; NE    Prone: (L) knee flexion x 10+7 reps; NE    Prone: (L) hip extension 10 reps x 2 cts ea; NE    Step up onto a 6 inch step with (L) LE lead 2 x 10 reps; NE    Ambulation with RW with SBA/Mod I x 60 feet; NE Ambulation with RW with SBA x 60 feet; P, NW (L) hip    NuStep LE only L2-3-4 x 10 mins; NE    PERRI over rail x 10 reps; Dec, NB    Pronelying x 3 mins; Dec, A (L) hip pain    DARRIN x 2 mins; NE (feels good in the (L) hip)    Step up onto a 4 inch step (L) LE lead with (R) hand rail support x 10 reps; NE    Shuttle: (L) LE leg press 3b 2 x 20 reps; NE     Scifit UE only L1 fwd/bkwd x 5 mins ea; NE    PERRI over rail x 10 reps; NE    Supine: (L) SLR 1 lb 2 x 10 reps x 10 cts ea; P, NW    Supine: (B) LE bridge with greenTB abd resist 10 reps x 10 cts ea; NE    Step up onto a 6 inch step (L) LE lead x 10 reps; NE    PERRI over rail x 10 reps; NE Ambulation with RW with SBA x 60 feet; P, NW (L) hip    NuStep LE only L2-3 x 10 mins; NE    Supine: (L) SLR 3 lbs 2 x 10 reps x 10 cts ea; P, NW (less painful)    Supine: (B) LE bridge with blueTB abd resist 10 reps x 10 cts ea; NE    Step up onto a 6 inch step (L) LE lead 2 x 10 reps; NE    Ambulation with RW with SBA/Mod I x 60 feet; NE    PERRI over rail x 10 reps; Dec, NB    Shuttle: (L) leg press 3b x 20+         Ambulation with RW with SBA x 60 feet; P, NW (L) hip    NuStep LE only L2-3-4-6-2 x 10 mins; NE    PERRI over rail x 10 reps; NE      Supine: (L) SLR 3 lbs 2 x 10 reps x 10 cts ea; P, NW (less painful)    Supine: (B) LE bridge with blueTB abd resist 10 reps x 10 cts ea; NE    Supine hooklying:  Alt LE knee lift x 10 reps; P, NW    Step up onto a 6 inch step (L) LE lead 2 x 10 reps; NE    PERRI over rail x 10 reps; NE   Ambulation with RW with SBA x 60 feet; P, NW (L) hip    NuStep LE only L2-3-4-6-2 x 10 mins; NE    Shuttle: (B) LE leg press 5b 2 x 20 reps; NE    Shuttle: (L) LE leg press 4b x 20 reps; NE    Gait training with RW with SBA x 60+30 feet     Step up onto a 6 inch step (L) LE lead with 2 lbs 2 x 10 reps; NE    Hydrant: (L) LE 1 lb ball 6 x 5 cts ea; NE tired    PERRI over rail x 10 reps; NE                        Date:12/1/202  Tx#: 14/18 Date: 12/7/2022  Tx#: 15/18 Date: 12/9/2022  Tx#: 16/18     NuStep LE only L2-3-4-6-2 x 10 mins; NE    Shuttle: (B) LE leg press 5b 2 x 20 reps; NE    Shuttle: (L) LE leg press 3b 2 x 20 reps; NE    Gait training with RW with SBA x 60+30 feet     Step up onto a 6 inch step (L) LE lead with 2 lbs 2 x 10 reps; NE    Seated: (L/R) repeated knee extension 10 reps x 5-10 cts ea; NE tired    PERRI over rail x 10 reps; NE NuStep LE only L2-3-4-5-2 x 10 mins; NE    Shuttle: (B) LE leg press 6b 2 x 20 reps; NE    Shuttle: (L) LE leg press 4b 2 x 10 reps; NE    Gait training with RW with SBA x 30+30+30 feet     Step up onto a 6 inch step (L) LE lead with 4 lbs 2 x 10 reps; NE tired    Seated: (R/L) bicep curls    PERRI over rail x 10 reps; NE NuStep LE only L2-3-4-5-2 x 10 mins; NE    Shuttle: (B) LE leg press 6b 2 x 20 reps; NE    Shuttle: (L) LE leg press 4b 2 x 10 reps; NE    Seated: (L/R) LAQ 10 reps x 10 cts ea; NE    Gait training with RW with SBA x 30+30 feet    Step up onto a 6 inch step (L) LE lead with 4 lbs x 10 reps; NE tired    Seated: (R/L) bicep curls 6 lbs x 20 reps ea; NE tired    PERRI over rail x 10 reps; NE       Assessment/HEP:   Kiley Ferguson continue to perform (L) LE strengthening and stability exercises with cueing for safety and posture correction. She report less pain/ache in the (L) hip during walking at the end of her session.   Instead of using a wheelchair she walked in today for PT and she feels good walking. Goals:   (18 visits)  1. Patient to consistently perform their HEP and it's progression to maintain their improved condition. 2. Patient to have reduced and abolished symptoms to to be able to bend down, rise up from sitting, take the first few steps in the morning, walk, and climb up/donw stairs without producing or increasing symptoms in the (L) groin, lateral hip/buttock, and anterior thigh. 3. Patient to have WNL of (L) Hip AROM in all directions with 4/5 MMT in all motions to promote all home, work, recreational, and functional activities without discomfort or deviation. Plan:   Re-assess next session and continue with (L) LE ROM, strengthening, stretching, gait training, balance training, and HEP progression. Charges:  TherEx x 4       Total Timed Treatment: 53 mins Total Treatment Time: 54 mins

## 2022-12-13 ENCOUNTER — OFFICE VISIT (OUTPATIENT)
Dept: PHYSICAL THERAPY | Age: 82
End: 2022-12-13
Attending: ORTHOPAEDIC SURGERY
Payer: MEDICARE

## 2022-12-13 PROCEDURE — 97110 THERAPEUTIC EXERCISES: CPT | Performed by: PHYSICAL THERAPIST

## 2022-12-13 NOTE — PROGRESS NOTES
Date: 12/13/2022         Dx: Status post open reduction and internal fixation (ORIF) of fracture (Z98.890,Z87.81)  LBP              Authorized # of Visits:  18 + 12 (Rx: 12/8/2022) = 30        Referring MD:  Mirtha Howard  Next MD visit: none scheduled    Medication Changes since last visit?: No    Subjective: Lida Rachel came in walking with a rolling walker. This is only her 2nd time doing this and she feels tired. She needed to rest prior to starting her exercises. She state that she still have pain in the (L) hip while walking. She continue to do seated exercises at home. Objective:  (-) Homans (L) LE;  (-) Tenderness (L) length of thigh;  (-) Warmth; 0/10 (L) lateral buttock sitting and standing; ache (L) hip walking.       Date: 10/12/2022  Tx#: 2/18 Date: 10/14/2022  Tx#: 3/18 Date: 10/19/2022  Tx#: 4/18 Date: 10/21/2022  Tx#: 5/18 Date: 10/25/2022  Tx#: 6/18 Date: 10/27/2022  Tx#: 7/18   Ambulation to bed/table x 20 feet with rolling walker; ache (L) LE during stance phase    Supine: (L) LE hamstring and quadricep isometric 10 reps x 10 cts ea; NE    Supine: (L) LE SLR with belt assist 10 reps x 5-10 cts ea; NE    Supine: hooklying with greenTB 10 reps x 10 cts ea; NE    Supine: (B) LE bridge 10 reps x 10 cts ea; NE    Supine to sit; cueing needed to maintain good form and posture    Gait training with a rolling walker in department; no ache/pain (L) LE (better) Ambulation to bed/table then to NuStep 2 x 20 feet with rolling walker; ache (L) LE during stance phase    NuStep LE only L1 x 10 mins; Dec, B    Ambulation to bed/mat less ache/pain (L) hip/thigh    Supine: (L) LE SLR 10+5 reps x 5 cts ea; NE    Supine: (L) hamstring sets 2 x 10 reps x 10 cts ea; NE tired    Supine: (B) LE bridge with hip abduction greenTB resist 10 reps x 10 cts ea; NE    SLY: (L) LE clam redTB 10 reps x 10 cts ea; NE tired    Seated: (L) hamstring curl redTB 10 reps x 10 cts ea; NE tired/cramping    Gait training with a rolling walker in department; P, NW ache/pain (L) LE (better)    PERRI over rail x 10 reps; NE Ambulation with RW with SBA x 60 feet; P, NW (L) hip    PERRI over rail x 10 reps;  NE    No change (L) hip ache during ambulation    NuStep LE L1-2 x 10 mins; P, NW    Supine: (L) LE SLR 10+5 reps x 10 cts ea; NE (R) thigh muscles working    Supine hooklying: bridge 3 reps x 5 cts ea; P, NW (L) hip    Supine hooklying: (B) LE hip abduction green-blueTB 2 x 10 reps x 10 cts ea; NE    Supine to sit transfer Denise/CGA    Seated: (B) LE abduction blueTB resist 10 reps x 10 cts ea; NE    Ambulation with RW with SBA x 25 feet; same ache (L) hip    Shuttle: (B) leg press 3b 2 x 10 reps; NE    PERRI oer rail x 10 reps; NE    Ambulation with RW with SBA x 25 feet less pain (L) hip  Ambulation with RW with SBA x 60 feet; P, NW (L) hip    NuStep LE only L1-2-3 x 10 mins; NE    PERRI over rail x 10 reps; NE    Supine: (L) SLR 10 reps x 10 cts ea; NE     + 1 lb 2 x 5 reps x 5 cts ea; P, NW (sore)    SLY: Clam greenTB 10 reps x 10 cts ea; NE tired    Shuttle: (B) leg press 3b 2 x 10 reps; NE    Shuttle: (L) leg press  2b x 10 reps; NE    Ambulation with RW with SBA x 25 feet; P, NW             Ambulation with RW with SBA x 30 feet; P, NW (L) hip    PERRI over rail 2 x 10 reps; P, NW (better) mid back     - less pain (L) hip during walking (1/10)    PERRI over rail x 10 reps more; P, NW (better)     - no pain (L) hip during waking    Seated: (R/L) bicep curl 6 lbs x 10 reps ea; NE     Lateral walk with redTB abd resist and CGA/Denise x 5 feet; tired/nervous    PERRI over rail x 10 reps; NE       Ambulation with RW with SBA x 30 feet; P, NW (L) hip    PERRI over rail 2 x 10 reps; Dec, B      - less pain walking    PERRI over rail x 10 reps more; Dec, B     - lesser pain walking (does not feel like giving out anymore)    Sitting posture correction with lumbar roll x 3 mins; NE (feels good)    Pronelying x 5 mins; Dec, A    Prone to sit to stand; no pain/ache    PERRI over rail x 10 reps; NE    - no pain/ache (L) LE walking            Date: 11/2/2022  Tx#: 8/18 Date: 11/9/2022  Tx#: 9/18 Date: 11/11/2022  Tx#: 10/18 Date: 11/16/2022  Tx#: 11/18 Date: 11/18/2022  Tx#: 12/18 Date: 11/29/2022  Tx#: 13/18   Ambulation with RW with SBA x 60 feet; P, NW (L) hip    NuStep LE only L2-3-4 x 10 mins; NE    PERRI over rail x 10 reps; NE    Pronelying x 5 mins; NE    Prone: (L) knee flexion x 10+7 reps; NE    Prone: (L) hip extension 10 reps x 2 cts ea; NE    Step up onto a 6 inch step with (L) LE lead 2 x 10 reps; NE    Ambulation with RW with SBA/Mod I x 60 feet; NE Ambulation with RW with SBA x 60 feet; P, NW (L) hip    NuStep LE only L2-3-4 x 10 mins; NE    PERRI over rail x 10 reps; Dec, NB    Pronelying x 3 mins; Dec, A (L) hip pain    DARRIN x 2 mins; NE (feels good in the (L) hip)    Step up onto a 4 inch step (L) LE lead with (R) hand rail support x 10 reps; NE    Shuttle: (L) LE leg press 3b 2 x 20 reps; NE     Scifit UE only L1 fwd/bkwd x 5 mins ea; NE    PERRI over rail x 10 reps; NE    Supine: (L) SLR 1 lb 2 x 10 reps x 10 cts ea; P, NW    Supine: (B) LE bridge with greenTB abd resist 10 reps x 10 cts ea; NE    Step up onto a 6 inch step (L) LE lead x 10 reps; NE    PERRI over rail x 10 reps; NE Ambulation with RW with SBA x 60 feet; P, NW (L) hip    NuStep LE only L2-3 x 10 mins; NE    Supine: (L) SLR 3 lbs 2 x 10 reps x 10 cts ea; P, NW (less painful)    Supine: (B) LE bridge with blueTB abd resist 10 reps x 10 cts ea; NE    Step up onto a 6 inch step (L) LE lead 2 x 10 reps; NE    Ambulation with RW with SBA/Mod I x 60 feet; NE    PERRI over rail x 10 reps; Dec, NB    Shuttle: (L) leg press 3b x 20+         Ambulation with RW with SBA x 60 feet; P, NW (L) hip    NuStep LE only L2-3-4-6-2 x 10 mins; NE    PERRI over rail x 10 reps; NE      Supine: (L) SLR 3 lbs 2 x 10 reps x 10 cts ea; P, NW (less painful)    Supine: (B) LE bridge with blueTB abd resist 10 reps x 10 cts ea; NE    Supine hooklying:  Alt LE knee lift x 10 reps; P, NW    Step up onto a 6 inch step (L) LE lead 2 x 10 reps; NE    PERRI over rail x 10 reps; NE   Ambulation with RW with SBA x 60 feet; P, NW (L) hip    NuStep LE only L2-3-4-6-2 x 10 mins; NE    Shuttle: (B) LE leg press 5b 2 x 20 reps; NE    Shuttle: (L) LE leg press 4b x 20 reps; NE    Gait training with RW with SBA x 60+30 feet     Step up onto a 6 inch step (L) LE lead with 2 lbs 2 x 10 reps; NE    Hydrant: (L) LE 1 lb ball 6 x 5 cts ea; NE tired    PERRI over rail x 10 reps; NE                        Date:12/1/202  Tx#: 14/18 Date: 12/7/2022  Tx#: 15/18 Date: 12/9/2022  Tx#: 16/18 Date: 12/13/2022  Tx#: 17/18    NuStep LE only L2-3-4-6-2 x 10 mins; NE    Shuttle: (B) LE leg press 5b 2 x 20 reps; NE    Shuttle: (L) LE leg press 3b 2 x 20 reps; NE    Gait training with RW with SBA x 60+30 feet     Step up onto a 6 inch step (L) LE lead with 2 lbs 2 x 10 reps; NE    Seated: (L/R) repeated knee extension 10 reps x 5-10 cts ea; NE tired    PERRI over rail x 10 reps; NE NuStep LE only L2-3-4-5-2 x 10 mins; NE    Shuttle: (B) LE leg press 6b 2 x 20 reps; NE    Shuttle: (L) LE leg press 4b 2 x 10 reps; NE    Gait training with RW with SBA x 30+30+30 feet     Step up onto a 6 inch step (L) LE lead with 4 lbs 2 x 10 reps; NE tired    Seated: (R/L) bicep curls    PERRI over rail x 10 reps; NE NuStep LE only L2-3-4-5-2 x 10 mins; NE    Shuttle: (B) LE leg press 6b 2 x 20 reps; NE    Shuttle: (L) LE leg press 4b 2 x 10 reps; NE    Seated: (L/R) LAQ 10 reps x 10 cts ea; NE    Gait training with RW with SBA x 30+30 feet    Step up onto a 6 inch step (L) LE lead with 4 lbs x 10 reps; NE tired    Seated: (R/L) bicep curls 6 lbs x 20 reps ea; NE tired    PERRI over rail x 10 reps; NE NuStep LE only L2-3-4-5-2 x 10 mins; NE    Shuttle: (B) LE leg press 7b 2 x 10 reps; NE    Shuttle: (L) LE leg press 4b x 15+20 reps; NE    Seated: (L/R) LAQ 2 bs 10 reps x 10 cts ea; NE    Gait training with RW with SBA x 30+30 feet    Step up onto a 6 inch step (L) LE lead with 4 lbs x 10+5 reps; NE tired    Seated: (R/L) bicep curls, arm pulls 6 lbs x 20 reps ea; NE tired    PERRI over rail x 10 reps; NE      Assessment/HEP:   Dwight Bailey does not report pain in the (L) hip during exercises and after her session. She required rest periods between exercises. She was able to ambulate with a RW with her  safely after her session. Goals:   (18 visits)  1. Patient to consistently perform their HEP and it's progression to maintain their improved condition. 2. Patient to have reduced and abolished symptoms to to be able to bend down, rise up from sitting, take the first few steps in the morning, walk, and climb up/donw stairs without producing or increasing symptoms in the (L) groin, lateral hip/buttock, and anterior thigh. 3. Patient to have WNL of (L) Hip AROM in all directions with 4/5 MMT in all motions to promote all home, work, recreational, and functional activities without discomfort or deviation. Plan:   Re-assess next session and continue with (L) LE ROM, strengthening, stretching, gait training, balance training, and HEP progression. Charges:  TherEx x 4       Total Timed Treatment: 55 mins Total Treatment Time: 57 mins

## 2022-12-15 ENCOUNTER — TELEPHONE (OUTPATIENT)
Dept: PHYSICAL THERAPY | Facility: HOSPITAL | Age: 82
End: 2022-12-15

## 2022-12-15 ENCOUNTER — APPOINTMENT (OUTPATIENT)
Dept: PHYSICAL THERAPY | Age: 82
End: 2022-12-15
Attending: ORTHOPAEDIC SURGERY
Payer: MEDICARE

## 2022-12-20 ENCOUNTER — OFFICE VISIT (OUTPATIENT)
Dept: PHYSICAL THERAPY | Age: 82
End: 2022-12-20
Attending: ORTHOPAEDIC SURGERY
Payer: MEDICARE

## 2022-12-20 PROCEDURE — 97110 THERAPEUTIC EXERCISES: CPT | Performed by: PHYSICAL THERAPIST

## 2022-12-20 NOTE — PROGRESS NOTES
Date: 12/20/2022         Dx: Status post open reduction and internal fixation (ORIF) of fracture (Z98.890,Z87.81)  LBP              Authorized # of Visits:  [id number] + 12 (Rx: 12/8/2022) = 30        Referring MD:  Mirtha Howard  Next MD visit: none scheduled    Medication Changes since last visit?: No    Subjective: Lida Rachel report that she fell off her high chair at home. She slide off the chair and landed on her (R) hip and the (R) knee. She states that she does not have any pain in the (L) and (R) hip at this time. She does have a bruise at the (R) lateral knee but no tenderness. Objective:  (-) Homans (L) LE;  (-) Tenderness (L) hip/thigh and (R) knee/hip;  (-) Warmth; 0/10 (L) lateral buttock sitting and standing; no pain/ache (L) hip walking with a rolling walker.      Date: 10/12/2022  Tx#: 2/18 Date: 10/14/2022  Tx#: 3/18 Date: 10/19/2022  Tx#: 4/18 Date: 10/21/2022  Tx#: 5/18 Date: 10/25/2022  Tx#: 6/18 Date: 10/27/2022  Tx#: 7/18   Ambulation to bed/table x 20 feet with rolling walker; ache (L) LE during stance phase    Supine: (L) LE hamstring and quadricep isometric 10 reps x 10 cts ea; NE    Supine: (L) LE SLR with belt assist 10 reps x 5-10 cts ea; NE    Supine: hooklying with greenTB 10 reps x 10 cts ea; NE    Supine: (B) LE bridge 10 reps x 10 cts ea; NE    Supine to sit; cueing needed to maintain good form and posture    Gait training with a rolling walker in department; no ache/pain (L) LE (better) Ambulation to bed/table then to NuStep 2 x 20 feet with rolling walker; ache (L) LE during stance phase    NuStep LE only L1 x 10 mins; Dec, B    Ambulation to bed/mat less ache/pain (L) hip/thigh    Supine: (L) LE SLR 10+5 reps x 5 cts ea; NE    Supine: (L) hamstring sets 2 x 10 reps x 10 cts ea; NE tired    Supine: (B) LE bridge with hip abduction greenTB resist 10 reps x 10 cts ea; NE    SLY: (L) LE clam redTB 10 reps x 10 cts ea; NE tired    Seated: (L) hamstring curl redTB 10 reps x 10 cts ea; NE tired/cramping    Gait training with a rolling walker in department; P, NW ache/pain (L) LE (better)    PERRI over rail x 10 reps; NE Ambulation with RW with SBA x 60 feet; P, NW (L) hip    PERRI over rail x 10 reps;  NE    No change (L) hip ache during ambulation    NuStep LE L1-2 x 10 mins; P, NW    Supine: (L) LE SLR 10+5 reps x 10 cts ea; NE (R) thigh muscles working    Supine hooklying: bridge 3 reps x 5 cts ea; P, NW (L) hip    Supine hooklying: (B) LE hip abduction green-blueTB 2 x 10 reps x 10 cts ea; NE    Supine to sit transfer Denise/CGA    Seated: (B) LE abduction blueTB resist 10 reps x 10 cts ea; NE    Ambulation with RW with SBA x 25 feet; same ache (L) hip    Shuttle: (B) leg press 3b 2 x 10 reps; NE    PERRI oer rail x 10 reps; NE    Ambulation with RW with SBA x 25 feet less pain (L) hip  Ambulation with RW with SBA x 60 feet; P, NW (L) hip    NuStep LE only L1-2-3 x 10 mins; NE    PERRI over rail x 10 reps; NE    Supine: (L) SLR 10 reps x 10 cts ea; NE     + 1 lb 2 x 5 reps x 5 cts ea; P, NW (sore)    SLY: Clam greenTB 10 reps x 10 cts ea; NE tired    Shuttle: (B) leg press 3b 2 x 10 reps; NE    Shuttle: (L) leg press  2b x 10 reps; NE    Ambulation with RW with SBA x 25 feet; P, NW             Ambulation with RW with SBA x 30 feet; P, NW (L) hip    PERRI over rail 2 x 10 reps; P, NW (better) mid back     - less pain (L) hip during walking (1/10)    PERRI over rail x 10 reps more; P, NW (better)     - no pain (L) hip during waking    Seated: (R/L) bicep curl 6 lbs x 10 reps ea; NE     Lateral walk with redTB abd resist and CGA/Denise x 5 feet; tired/nervous    PERRI over rail x 10 reps; NE       Ambulation with RW with SBA x 30 feet; P, NW (L) hip    PERRI over rail 2 x 10 reps; Dec, B      - less pain walking    PERRI over rail x 10 reps more; Dec, B     - lesser pain walking (does not feel like giving out anymore)    Sitting posture correction with lumbar roll x 3 mins; NE (feels good)    Pronelying x 5 mins; Dec, A    Prone to sit to stand; no pain/ache    PERRI over rail x 10 reps; NE    - no pain/ache (L) LE walking            Date: 11/2/2022  Tx#: 8/18 Date: 11/9/2022  Tx#: 9/18 Date: 11/11/2022  Tx#: 10/18 Date: 11/16/2022  Tx#: 11/18 Date: 11/18/2022  Tx#: 12/18 Date: 11/29/2022  Tx#: 13/18   Ambulation with RW with SBA x 60 feet; P, NW (L) hip    NuStep LE only L2-3-4 x 10 mins; NE    PERRI over rail x 10 reps; NE    Pronelying x 5 mins; NE    Prone: (L) knee flexion x 10+7 reps; NE    Prone: (L) hip extension 10 reps x 2 cts ea; NE    Step up onto a 6 inch step with (L) LE lead 2 x 10 reps; NE    Ambulation with RW with SBA/Mod I x 60 feet; NE Ambulation with RW with SBA x 60 feet; P, NW (L) hip    NuStep LE only L2-3-4 x 10 mins; NE    PERRI over rail x 10 reps; Dec, NB    Pronelying x 3 mins; Dec, A (L) hip pain    DARRIN x 2 mins; NE (feels good in the (L) hip)    Step up onto a 4 inch step (L) LE lead with (R) hand rail support x 10 reps; NE    Shuttle: (L) LE leg press 3b 2 x 20 reps; NE     Scifit UE only L1 fwd/bkwd x 5 mins ea; NE    PERRI over rail x 10 reps; NE    Supine: (L) SLR 1 lb 2 x 10 reps x 10 cts ea; P, NW    Supine: (B) LE bridge with greenTB abd resist 10 reps x 10 cts ea; NE    Step up onto a 6 inch step (L) LE lead x 10 reps; NE    PERRI over rail x 10 reps; NE Ambulation with RW with SBA x 60 feet; P, NW (L) hip    NuStep LE only L2-3 x 10 mins; NE    Supine: (L) SLR 3 lbs 2 x 10 reps x 10 cts ea; P, NW (less painful)    Supine: (B) LE bridge with blueTB abd resist 10 reps x 10 cts ea; NE    Step up onto a 6 inch step (L) LE lead 2 x 10 reps; NE    Ambulation with RW with SBA/Mod I x 60 feet; NE    PERRI over rail x 10 reps; Dec, NB    Shuttle: (L) leg press 3b x 20+         Ambulation with RW with SBA x 60 feet; P, NW (L) hip    NuStep LE only L2-3-4-6-2 x 10 mins; NE    PERRI over rail x 10 reps; NE      Supine: (L) SLR 3 lbs 2 x 10 reps x 10 cts ea; P, NW (less painful)    Supine: (B) LE bridge with blueTB abd resist 10 reps x 10 cts ea; NE    Supine hooklying:  Alt LE knee lift x 10 reps; P, NW    Step up onto a 6 inch step (L) LE lead 2 x 10 reps; NE    PERRI over rail x 10 reps; NE   Ambulation with RW with SBA x 60 feet; P, NW (L) hip    NuStep LE only L2-3-4-6-2 x 10 mins; NE    Shuttle: (B) LE leg press 5b 2 x 20 reps; NE    Shuttle: (L) LE leg press 4b x 20 reps; NE    Gait training with RW with SBA x 60+30 feet     Step up onto a 6 inch step (L) LE lead with 2 lbs 2 x 10 reps; NE    Hydrant: (L) LE 1 lb ball 6 x 5 cts ea; NE tired    PERRI over rail x 10 reps; NE                        Date:12/1/202  Tx#: 14/18 Date: 12/7/2022  Tx#: 15/18 Date: 12/9/2022  Tx#: 16/18 Date: 12/13/2022  Tx#: 17/18 Date: 12/20/2022  Tx#: 18/30   NuStep LE only L2-3-4-6-2 x 10 mins; NE    Shuttle: (B) LE leg press 5b 2 x 20 reps; NE    Shuttle: (L) LE leg press 3b 2 x 20 reps; NE    Gait training with RW with SBA x 60+30 feet     Step up onto a 6 inch step (L) LE lead with 2 lbs 2 x 10 reps; NE    Seated: (L/R) repeated knee extension 10 reps x 5-10 cts ea; NE tired    PERRI over rail x 10 reps; NE NuStep LE only L2-3-4-5-2 x 10 mins; NE    Shuttle: (B) LE leg press 6b 2 x 20 reps; NE    Shuttle: (L) LE leg press 4b 2 x 10 reps; NE    Gait training with RW with SBA x 30+30+30 feet     Step up onto a 6 inch step (L) LE lead with 4 lbs 2 x 10 reps; NE tired    Seated: (R/L) bicep curls    PERRI over rail x 10 reps; NE NuStep LE only L2-3-4-5-2 x 10 mins; NE    Shuttle: (B) LE leg press 6b 2 x 20 reps; NE    Shuttle: (L) LE leg press 4b 2 x 10 reps; NE    Seated: (L/R) LAQ 10 reps x 10 cts ea; NE    Gait training with RW with SBA x 30+30 feet    Step up onto a 6 inch step (L) LE lead with 4 lbs x 10 reps; NE tired    Seated: (R/L) bicep curls 6 lbs x 20 reps ea; NE tired    PERRI over rail x 10 reps; NE NuStep LE only L2-3-4-5-2 x 10 mins; NE    Shuttle: (B) LE leg press 7b 2 x 10 reps; NE    Shuttle: (L) LE leg press 4b x 15+20 reps; NE    Seated: (L/R) LAQ 2 bs 10 reps x 10 cts ea; NE    Gait training with RW with SBA x 30+30 feet    Step up onto a 6 inch step (L) LE lead with 4 lbs x 10+5 reps; NE tired    Seated: (R/L) bicep curls, arm pulls 6 lbs x 20 reps ea; NE tired    PERRI over rail x 10 reps; NE NuStep LE only L1-2-3-2 x 10 mins; NE tired    Gait training with RW with SBA x 30+30 feet    Supine: (L/R) SLR 3-2 lbs 10 reps x 10 cts ea; P, NW (L)/ (R) NE tired    Supine hooklying: bridge with hip abduction with greenTB resist 10 reps x 10 cts ea; NE tired    Step up onto a 4 inch step with redTB abduction resist (R/L) LE lead x 10 reps; NE tired    Shuttle: (B) LE leg press 6b x 20 reps; NE tired    Shuttle: (R/L) LE leg press 4-3b x 20 reps ea; NE tired    PERRI over rail x 10 reps; NE     Assessment/HEP:   Harini report ache in the (L) hip during SLR exercise but no worse as a result. She did not report any pain in the (R) hip during her session. She was able to ambulate with a RW with SBA in the department without pain/ache (B) hips. She required rest periods between exercises. All questions and concerns were answered and addressed at this time. Goals:   (18 visits)  1. Patient to consistently perform their HEP and it's progression to maintain their improved condition. 2. Patient to have reduced and abolished symptoms to to be able to bend down, rise up from sitting, take the first few steps in the morning, walk, and climb up/donw stairs without producing or increasing symptoms in the (L) groin, lateral hip/buttock, and anterior thigh. 3. Patient to have WNL of (L) Hip AROM in all directions with 4/5 MMT in all motions to promote all home, work, recreational, and functional activities without discomfort or deviation. Plan:   Re-assess next session and continue with (L) LE ROM, strengthening, stretching, gait training, balance training, and HEP progression. Charges:  TherEx x 4       Total Timed Treatment: 56 mins Total Treatment Time: 58 mins

## 2022-12-22 ENCOUNTER — APPOINTMENT (OUTPATIENT)
Dept: PHYSICAL THERAPY | Age: 82
End: 2022-12-22
Attending: ORTHOPAEDIC SURGERY
Payer: MEDICARE

## 2022-12-22 ENCOUNTER — TELEPHONE (OUTPATIENT)
Dept: PHYSICAL THERAPY | Facility: HOSPITAL | Age: 82
End: 2022-12-22

## 2022-12-23 ENCOUNTER — APPOINTMENT (OUTPATIENT)
Dept: PHYSICAL THERAPY | Age: 82
End: 2022-12-23
Attending: ORTHOPAEDIC SURGERY
Payer: MEDICARE

## 2023-01-04 ENCOUNTER — OFFICE VISIT (OUTPATIENT)
Dept: PHYSICAL THERAPY | Age: 83
End: 2023-01-04
Attending: ORTHOPAEDIC SURGERY
Payer: MEDICARE

## 2023-01-04 PROCEDURE — 97110 THERAPEUTIC EXERCISES: CPT | Performed by: PHYSICAL THERAPIST

## 2023-01-04 NOTE — PROGRESS NOTES
Date: 1/4/2023         Dx: Status post open reduction and internal fixation (ORIF) of fracture (Z98.890,Z87.81)  LBP              Authorized # of Visits:  18 + 12 (Rx: 12/8/2022) = 30        Referring MD:  Rolando Ely  Next MD visit: none scheduled    Medication Changes since last visit?: No    Subjective: Kendra Chen report that she is feeling much better today. She is able to climb up/down stairs normally (reciprocally) with (B) rails at home. She also has been trying to do her exercises regularly at home. She has noticed that she is walking faster and steadier with an upright posture. She came in today walking with a rolling walker instead of using a wheelchair. Objective:  (-) Homans (L) LE;  (-) Tenderness (L) hip/thigh and (R) knee/hip;  (-) Warmth; 0/10 (L) lateral buttock sitting and standing; no pain/ache (L) hip walking with a rolling walker.      Date: 10/12/2022  Tx#: 2/18 Date: 10/14/2022  Tx#: 3/18 Date: 10/19/2022  Tx#: 4/18 Date: 10/21/2022  Tx#: 5/18 Date: 10/25/2022  Tx#: 6/18 Date: 10/27/2022  Tx#: 7/18   Ambulation to bed/table x 20 feet with rolling walker; ache (L) LE during stance phase    Supine: (L) LE hamstring and quadricep isometric 10 reps x 10 cts ea; NE    Supine: (L) LE SLR with belt assist 10 reps x 5-10 cts ea; NE    Supine: hooklying with greenTB 10 reps x 10 cts ea; NE    Supine: (B) LE bridge 10 reps x 10 cts ea; NE    Supine to sit; cueing needed to maintain good form and posture    Gait training with a rolling walker in department; no ache/pain (L) LE (better) Ambulation to bed/table then to NuStep 2 x 20 feet with rolling walker; ache (L) LE during stance phase    NuStep LE only L1 x 10 mins; Dec, B    Ambulation to bed/mat less ache/pain (L) hip/thigh    Supine: (L) LE SLR 10+5 reps x 5 cts ea; NE    Supine: (L) hamstring sets 2 x 10 reps x 10 cts ea; NE tired    Supine: (B) LE bridge with hip abduction greenTB resist 10 reps x 10 cts ea; NE    SLY: (L) LE clam redTB 10 reps x 10 cts ea; NE tired    Seated: (L) hamstring curl redTB 10 reps x 10 cts ea; NE tired/cramping    Gait training with a rolling walker in department; P, NW ache/pain (L) LE (better)    PERRI over rail x 10 reps; NE Ambulation with RW with SBA x 60 feet; P, NW (L) hip    PERRI over rail x 10 reps;  NE    No change (L) hip ache during ambulation    NuStep LE L1-2 x 10 mins; P, NW    Supine: (L) LE SLR 10+5 reps x 10 cts ea; NE (R) thigh muscles working    Supine hooklying: bridge 3 reps x 5 cts ea; P, NW (L) hip    Supine hooklying: (B) LE hip abduction green-blueTB 2 x 10 reps x 10 cts ea; NE    Supine to sit transfer Denise/CGA    Seated: (B) LE abduction blueTB resist 10 reps x 10 cts ea; NE    Ambulation with RW with SBA x 25 feet; same ache (L) hip    Shuttle: (B) leg press 3b 2 x 10 reps; NE    PERRI oer rail x 10 reps; NE    Ambulation with RW with SBA x 25 feet less pain (L) hip  Ambulation with RW with SBA x 60 feet; P, NW (L) hip    NuStep LE only L1-2-3 x 10 mins; NE    PERRI over rail x 10 reps; NE    Supine: (L) SLR 10 reps x 10 cts ea; NE     + 1 lb 2 x 5 reps x 5 cts ea; P, NW (sore)    SLY: Clam greenTB 10 reps x 10 cts ea; NE tired    Shuttle: (B) leg press 3b 2 x 10 reps; NE    Shuttle: (L) leg press  2b x 10 reps; NE    Ambulation with RW with SBA x 25 feet; P, NW             Ambulation with RW with SBA x 30 feet; P, NW (L) hip    PERRI over rail 2 x 10 reps; P, NW (better) mid back     - less pain (L) hip during walking (1/10)    PERRI over rail x 10 reps more; P, NW (better)     - no pain (L) hip during waking    Seated: (R/L) bicep curl 6 lbs x 10 reps ea; NE     Lateral walk with redTB abd resist and CGA/Denise x 5 feet; tired/nervous    PERRI over rail x 10 reps; NE       Ambulation with RW with SBA x 30 feet; P, NW (L) hip    PERRI over rail 2 x 10 reps; Dec, B      - less pain walking    PERRI over rail x 10 reps more; Dec, B     - lesser pain walking (does not feel like giving out anymore)    Sitting posture correction with lumbar roll x 3 mins; NE (feels good)    Pronelying x 5 mins; Dec, A    Prone to sit to stand; no pain/ache    PERRI over rail x 10 reps; NE    - no pain/ache (L) LE walking            Date: 11/2/2022  Tx#: 8/18 Date: 11/9/2022  Tx#: 9/18 Date: 11/11/2022  Tx#: 10/18 Date: 11/16/2022  Tx#: 11/18 Date: 11/18/2022  Tx#: 12/18 Date: 11/29/2022  Tx#: 13/18   Ambulation with RW with SBA x 60 feet; P, NW (L) hip    NuStep LE only L2-3-4 x 10 mins; NE    PERRI over rail x 10 reps; NE    Pronelying x 5 mins; NE    Prone: (L) knee flexion x 10+7 reps; NE    Prone: (L) hip extension 10 reps x 2 cts ea; NE    Step up onto a 6 inch step with (L) LE lead 2 x 10 reps; NE    Ambulation with RW with SBA/Mod I x 60 feet; NE Ambulation with RW with SBA x 60 feet; P, NW (L) hip    NuStep LE only L2-3-4 x 10 mins; NE    PERRI over rail x 10 reps; Dec, NB    Pronelying x 3 mins; Dec, A (L) hip pain    DARRIN x 2 mins; NE (feels good in the (L) hip)    Step up onto a 4 inch step (L) LE lead with (R) hand rail support x 10 reps; NE    Shuttle: (L) LE leg press 3b 2 x 20 reps; NE     Scifit UE only L1 fwd/bkwd x 5 mins ea; NE    PERRI over rail x 10 reps; NE    Supine: (L) SLR 1 lb 2 x 10 reps x 10 cts ea; P, NW    Supine: (B) LE bridge with greenTB abd resist 10 reps x 10 cts ea; NE    Step up onto a 6 inch step (L) LE lead x 10 reps; NE    PERRI over rail x 10 reps; NE Ambulation with RW with SBA x 60 feet; P, NW (L) hip    NuStep LE only L2-3 x 10 mins; NE    Supine: (L) SLR 3 lbs 2 x 10 reps x 10 cts ea; P, NW (less painful)    Supine: (B) LE bridge with blueTB abd resist 10 reps x 10 cts ea; NE    Step up onto a 6 inch step (L) LE lead 2 x 10 reps; NE    Ambulation with RW with SBA/Mod I x 60 feet; NE    PERRI over rail x 10 reps; Dec, NB    Shuttle: (L) leg press 3b x 20+         Ambulation with RW with SBA x 60 feet; P, NW (L) hip    NuStep LE only L2-3-4-6-2 x 10 mins; NE    PERRI over rail x 10 reps; NE      Supine: (L) SLR 3 lbs 2 x 10 reps x 10 cts ea; P, NW (less painful)    Supine: (B) LE bridge with blueTB abd resist 10 reps x 10 cts ea; NE    Supine hooklying:  Alt LE knee lift x 10 reps; P, NW    Step up onto a 6 inch step (L) LE lead 2 x 10 reps; NE    PERRI over rail x 10 reps; NE   Ambulation with RW with SBA x 60 feet; P, NW (L) hip    NuStep LE only L2-3-4-6-2 x 10 mins; NE    Shuttle: (B) LE leg press 5b 2 x 20 reps; NE    Shuttle: (L) LE leg press 4b x 20 reps; NE    Gait training with RW with SBA x 60+30 feet     Step up onto a 6 inch step (L) LE lead with 2 lbs 2 x 10 reps; NE    Hydrant: (L) LE 1 lb ball 6 x 5 cts ea; NE tired    PERRI over rail x 10 reps; NE                        Date:12/1/202  Tx#: 14/18 Date: 12/7/2022  Tx#: 15/18 Date: 12/9/2022  Tx#: 16/18 Date: 12/13/2022  Tx#: 17/18 Date: 12/20/2022  Tx#: 18/30   NuStep LE only L2-3-4-6-2 x 10 mins; NE    Shuttle: (B) LE leg press 5b 2 x 20 reps; NE    Shuttle: (L) LE leg press 3b 2 x 20 reps; NE    Gait training with RW with SBA x 60+30 feet     Step up onto a 6 inch step (L) LE lead with 2 lbs 2 x 10 reps; NE    Seated: (L/R) repeated knee extension 10 reps x 5-10 cts ea; NE tired    PERRI over rail x 10 reps; NE NuStep LE only L2-3-4-5-2 x 10 mins; NE    Shuttle: (B) LE leg press 6b 2 x 20 reps; NE    Shuttle: (L) LE leg press 4b 2 x 10 reps; NE    Gait training with RW with SBA x 30+30+30 feet     Step up onto a 6 inch step (L) LE lead with 4 lbs 2 x 10 reps; NE tired    Seated: (R/L) bicep curls    PERRI over rail x 10 reps; NE NuStep LE only L2-3-4-5-2 x 10 mins; NE    Shuttle: (B) LE leg press 6b 2 x 20 reps; NE    Shuttle: (L) LE leg press 4b 2 x 10 reps; NE    Seated: (L/R) LAQ 10 reps x 10 cts ea; NE    Gait training with RW with SBA x 30+30 feet    Step up onto a 6 inch step (L) LE lead with 4 lbs x 10 reps; NE tired    Seated: (R/L) bicep curls 6 lbs x 20 reps ea; NE tired    PERRI over rail x 10 reps; NE NuStep LE only L2-3-4-5-2 x 10 mins; NE    Shuttle: (B) LE leg press 7b 2 x 10 reps; NE    Shuttle: (L) LE leg press 4b x 15+20 reps; NE    Seated: (L/R) LAQ 2 bs 10 reps x 10 cts ea; NE    Gait training with RW with SBA x 30+30 feet    Step up onto a 6 inch step (L) LE lead with 4 lbs x 10+5 reps; NE tired    Seated: (R/L) bicep curls, arm pulls 6 lbs x 20 reps ea; NE tired    PERRI over rail x 10 reps; NE NuStep LE only L1-2-3-2 x 10 mins; NE tired    Gait training with RW with SBA x 30+30 feet    Supine: (L/R) SLR 3-2 lbs 10 reps x 10 cts ea; P, NW (L)/ (R) NE tired    Supine hooklying: bridge with hip abduction with greenTB resist 10 reps x 10 cts ea; NE tired    Step up onto a 4 inch step with redTB abduction resist (R/L) LE lead x 10 reps; NE tired    Shuttle: (B) LE leg press 6b x 20 reps; NE tired    Shuttle: (R/L) LE leg press 4-3b x 20 reps ea; NE tired    PERRI over rail x 10 reps; NE     Date: 1/4/2023  Tx#: 19/30        NuStep LE only L2-3-4-2 x 10 mins; NE    Shuttle: (B) LE leg press 7b x 20+15 reps; NE    Shuttle: (L) LE leg press 4b x 20+10 reps; NE    PERRI over rail x 10 reps; NE    Step up onto a 6 inch step with greenTB abduction resist (L) LE lead x 10 reps; NE tired/fatigued    Seated: (R/L) bicep curl 7 lbs x 20 reps; NE    PERRI over rail x 10 reps; NE          Assessment/HEP:   Kendra Chen is ambulating with a steadier gait with a constant wide KEKE and short and equal step length with heel-toe pattern on (B) LEs. She reported an ache in the (L) groin area during leg press exercise but no worse as a result. She was feeling under the weather and needed frequent rest periods. She was still able to progress with increased resistance, repetitions, and height of step without symptoms but with minimal cueing to correct form, posture, and alignment. All questions and concerns were answered and addressed at this time. Goals:   (18 visits)  1.  Patient to consistently perform their HEP and it's progression to maintain their improved condition. 2. Patient to have reduced and abolished symptoms to to be able to bend down, rise up from sitting, take the first few steps in the morning, walk, and climb up/donw stairs without producing or increasing symptoms in the (L) groin, lateral hip/buttock, and anterior thigh. 3. Patient to have WNL of (L) Hip AROM in all directions with 4/5 MMT in all motions to promote all home, work, recreational, and functional activities without discomfort or deviation. Plan:   Re-assess next session and continue with (L) LE ROM, strengthening, stretching, gait training, balance training, and HEP progression. Charges:  TherEx x 4       Total Timed Treatment: 54 mins Total Treatment Time: 56 mins

## 2023-01-06 ENCOUNTER — OFFICE VISIT (OUTPATIENT)
Dept: PHYSICAL THERAPY | Age: 83
End: 2023-01-06
Attending: ORTHOPAEDIC SURGERY
Payer: MEDICARE

## 2023-01-06 PROCEDURE — 97110 THERAPEUTIC EXERCISES: CPT | Performed by: PHYSICAL THERAPIST

## 2023-01-06 NOTE — PROGRESS NOTES
Date: 1/6/2023         Dx: Status post open reduction and internal fixation (ORIF) of fracture (Z98.890,Z87.81)  LBP              Authorized # of Visits:  [id number] + 12 (Rx: 12/8/2022) = 30        Referring MD:  Sarai Garcia  Next MD visit: none scheduled    Medication Changes since last visit?: No    Subjective: Mitesh Stabs report that feels \"gallito draggy\" today. She does not report any pain in the (L) hip at this time. She doing her HEP and she is being careful climbing up/down stairs. Objective:  (-) Homans (L) LE;  (-) Tenderness (L) hip/thigh and (R) knee/hip;  (-) Warmth; 0/10 (L) lateral buttock sitting and standing; no pain/ache (L) hip walking with a rolling walker.      Date: 10/12/2022  Tx#: 2/18 Date: 10/14/2022  Tx#: 3/18 Date: 10/19/2022  Tx#: 4/18 Date: 10/21/2022  Tx#: 5/18 Date: 10/25/2022  Tx#: 6/18 Date: 10/27/2022  Tx#: 7/18   Ambulation to bed/table x 20 feet with rolling walker; ache (L) LE during stance phase    Supine: (L) LE hamstring and quadricep isometric 10 reps x 10 cts ea; NE    Supine: (L) LE SLR with belt assist 10 reps x 5-10 cts ea; NE    Supine: hooklying with greenTB 10 reps x 10 cts ea; NE    Supine: (B) LE bridge 10 reps x 10 cts ea; NE    Supine to sit; cueing needed to maintain good form and posture    Gait training with a rolling walker in department; no ache/pain (L) LE (better) Ambulation to bed/table then to NuStep 2 x 20 feet with rolling walker; ache (L) LE during stance phase    NuStep LE only L1 x 10 mins; Dec, B    Ambulation to bed/mat less ache/pain (L) hip/thigh    Supine: (L) LE SLR 10+5 reps x 5 cts ea; NE    Supine: (L) hamstring sets 2 x 10 reps x 10 cts ea; NE tired    Supine: (B) LE bridge with hip abduction greenTB resist 10 reps x 10 cts ea; NE    SLY: (L) LE clam redTB 10 reps x 10 cts ea; NE tired    Seated: (L) hamstring curl redTB 10 reps x 10 cts ea; NE tired/cramping    Gait training with a rolling walker in department; P, NW ache/pain (L) LE (better)    PERRI over rail x 10 reps; NE Ambulation with RW with SBA x 60 feet; P, NW (L) hip    PERRI over rail x 10 reps;  NE    No change (L) hip ache during ambulation    NuStep LE L1-2 x 10 mins; P, NW    Supine: (L) LE SLR 10+5 reps x 10 cts ea; NE (R) thigh muscles working    Supine hooklying: bridge 3 reps x 5 cts ea; P, NW (L) hip    Supine hooklying: (B) LE hip abduction green-blueTB 2 x 10 reps x 10 cts ea; NE    Supine to sit transfer Denise/CGA    Seated: (B) LE abduction blueTB resist 10 reps x 10 cts ea; NE    Ambulation with RW with SBA x 25 feet; same ache (L) hip    Shuttle: (B) leg press 3b 2 x 10 reps; NE    PERRI oer rail x 10 reps; NE    Ambulation with RW with SBA x 25 feet less pain (L) hip  Ambulation with RW with SBA x 60 feet; P, NW (L) hip    NuStep LE only L1-2-3 x 10 mins; NE    PERRI over rail x 10 reps; NE    Supine: (L) SLR 10 reps x 10 cts ea; NE     + 1 lb 2 x 5 reps x 5 cts ea; P, NW (sore)    SLY: Clam greenTB 10 reps x 10 cts ea; NE tired    Shuttle: (B) leg press 3b 2 x 10 reps; NE    Shuttle: (L) leg press  2b x 10 reps; NE    Ambulation with RW with SBA x 25 feet; P, NW             Ambulation with RW with SBA x 30 feet; P, NW (L) hip    PERRI over rail 2 x 10 reps; P, NW (better) mid back     - less pain (L) hip during walking (1/10)    PERRI over rail x 10 reps more; P, NW (better)     - no pain (L) hip during waking    Seated: (R/L) bicep curl 6 lbs x 10 reps ea; NE     Lateral walk with redTB abd resist and CGA/Denise x 5 feet; tired/nervous    PERRI over rail x 10 reps; NE       Ambulation with RW with SBA x 30 feet; P, NW (L) hip    PERRI over rail 2 x 10 reps; Dec, B      - less pain walking    PERRI over rail x 10 reps more; Dec, B     - lesser pain walking (does not feel like giving out anymore)    Sitting posture correction with lumbar roll x 3 mins; NE (feels good)    Pronelying x 5 mins; Dec, A    Prone to sit to stand; no pain/ache    PERRI over rail x 10 reps; NE    - no pain/ache (L) LE walking            Date: 11/2/2022  Tx#: 8/18 Date: 11/9/2022  Tx#: 9/18 Date: 11/11/2022  Tx#: 10/18 Date: 11/16/2022  Tx#: 11/18 Date: 11/18/2022  Tx#: 12/18 Date: 11/29/2022  Tx#: 13/18   Ambulation with RW with SBA x 60 feet; P, NW (L) hip    NuStep LE only L2-3-4 x 10 mins; NE    PERRI over rail x 10 reps; NE    Pronelying x 5 mins; NE    Prone: (L) knee flexion x 10+7 reps; NE    Prone: (L) hip extension 10 reps x 2 cts ea; NE    Step up onto a 6 inch step with (L) LE lead 2 x 10 reps; NE    Ambulation with RW with SBA/Mod I x 60 feet; NE Ambulation with RW with SBA x 60 feet; P, NW (L) hip    NuStep LE only L2-3-4 x 10 mins; NE    PERRI over rail x 10 reps; Dec, NB    Pronelying x 3 mins; Dec, A (L) hip pain    DARRIN x 2 mins; NE (feels good in the (L) hip)    Step up onto a 4 inch step (L) LE lead with (R) hand rail support x 10 reps; NE    Shuttle: (L) LE leg press 3b 2 x 20 reps; NE     Scifit UE only L1 fwd/bkwd x 5 mins ea; NE    PERRI over rail x 10 reps; NE    Supine: (L) SLR 1 lb 2 x 10 reps x 10 cts ea; P, NW    Supine: (B) LE bridge with greenTB abd resist 10 reps x 10 cts ea; NE    Step up onto a 6 inch step (L) LE lead x 10 reps; NE    PERRI over rail x 10 reps; NE Ambulation with RW with SBA x 60 feet; P, NW (L) hip    NuStep LE only L2-3 x 10 mins; NE    Supine: (L) SLR 3 lbs 2 x 10 reps x 10 cts ea; P, NW (less painful)    Supine: (B) LE bridge with blueTB abd resist 10 reps x 10 cts ea; NE    Step up onto a 6 inch step (L) LE lead 2 x 10 reps; NE    Ambulation with RW with SBA/Mod I x 60 feet; NE    PERRI over rail x 10 reps; Dec, NB    Shuttle: (L) leg press 3b x 20+         Ambulation with RW with SBA x 60 feet; P, NW (L) hip    NuStep LE only L2-3-4-6-2 x 10 mins; NE    PERRI over rail x 10 reps; NE      Supine: (L) SLR 3 lbs 2 x 10 reps x 10 cts ea; P, NW (less painful)    Supine: (B) LE bridge with blueTB abd resist 10 reps x 10 cts ea; NE    Supine hooklying:  Alt LE knee lift x 10 reps; P, NW    Step up onto a 6 inch step (L) LE lead 2 x 10 reps; NE    PERRI over rail x 10 reps; NE   Ambulation with RW with SBA x 60 feet; P, NW (L) hip    NuStep LE only L2-3-4-6-2 x 10 mins; NE    Shuttle: (B) LE leg press 5b 2 x 20 reps; NE    Shuttle: (L) LE leg press 4b x 20 reps; NE    Gait training with RW with SBA x 60+30 feet     Step up onto a 6 inch step (L) LE lead with 2 lbs 2 x 10 reps; NE    Hydrant: (L) LE 1 lb ball 6 x 5 cts ea; NE tired    PERRI over rail x 10 reps; NE                        Date:12/1/202  Tx#: 14/18 Date: 12/7/2022  Tx#: 15/18 Date: 12/9/2022  Tx#: 16/18 Date: 12/13/2022  Tx#: 17/18 Date: 12/20/2022  Tx#: 18/30   NuStep LE only L2-3-4-6-2 x 10 mins; NE    Shuttle: (B) LE leg press 5b 2 x 20 reps; NE    Shuttle: (L) LE leg press 3b 2 x 20 reps; NE    Gait training with RW with SBA x 60+30 feet     Step up onto a 6 inch step (L) LE lead with 2 lbs 2 x 10 reps; NE    Seated: (L/R) repeated knee extension 10 reps x 5-10 cts ea; NE tired    PERRI over rail x 10 reps; NE NuStep LE only L2-3-4-5-2 x 10 mins; NE    Shuttle: (B) LE leg press 6b 2 x 20 reps; NE    Shuttle: (L) LE leg press 4b 2 x 10 reps; NE    Gait training with RW with SBA x 30+30+30 feet     Step up onto a 6 inch step (L) LE lead with 4 lbs 2 x 10 reps; NE tired    Seated: (R/L) bicep curls    PERRI over rail x 10 reps; NE NuStep LE only L2-3-4-5-2 x 10 mins; NE    Shuttle: (B) LE leg press 6b 2 x 20 reps; NE    Shuttle: (L) LE leg press 4b 2 x 10 reps; NE    Seated: (L/R) LAQ 10 reps x 10 cts ea; NE    Gait training with RW with SBA x 30+30 feet    Step up onto a 6 inch step (L) LE lead with 4 lbs x 10 reps; NE tired    Seated: (R/L) bicep curls 6 lbs x 20 reps ea; NE tired    PERRI over rail x 10 reps; NE NuStep LE only L2-3-4-5-2 x 10 mins; NE    Shuttle: (B) LE leg press 7b 2 x 10 reps; NE    Shuttle: (L) LE leg press 4b x 15+20 reps; NE    Seated: (L/R) LAQ 2 bs 10 reps x 10 cts ea; NE    Gait training with RW with SBA x 30+30 feet    Step up onto a 6 inch step (L) LE lead with 4 lbs x 10+5 reps; NE tired    Seated: (R/L) bicep curls, arm pulls 6 lbs x 20 reps ea; NE tired    PERRI over rail x 10 reps; NE NuStep LE only L1-2-3-2 x 10 mins; NE tired    Gait training with RW with SBA x 30+30 feet    Supine: (L/R) SLR 3-2 lbs 10 reps x 10 cts ea; P, NW (L)/ (R) NE tired    Supine hooklying: bridge with hip abduction with greenTB resist 10 reps x 10 cts ea; NE tired    Step up onto a 4 inch step with redTB abduction resist (R/L) LE lead x 10 reps; NE tired    Shuttle: (B) LE leg press 6b x 20 reps; NE tired    Shuttle: (R/L) LE leg press 4-3b x 20 reps ea; NE tired    PERRI over rail x 10 reps; NE     Date: 1/4/2023  Tx#: 19/30 Date: 1/6/2023  Tx#: 20/30       NuStep LE only L2-3-4-2 x 10 mins; NE    Shuttle: (B) LE leg press 7b x 20+15 reps; NE    Shuttle: (L) LE leg press 4b x 20+10 reps; NE    PERRI over rail x 10 reps; NE    Step up onto a 6 inch step with greenTB abduction resist (L) LE lead x 10 reps; NE tired/fatigued    Seated: (R/L) bicep curl 7 lbs x 20 reps; NE    PERRI over rail x 10 reps; NE NuStep LE only L2-3-4-5-2 x 10 mins; NE    Shuttle: (B) LE leg press 7b 2 x 20 reps; NE    Shuttle: (L) LE leg press 4b x 20+10 reps; NE    PERRI over rail x 10 reps; NE    bluefoam balance face forward and diagonal (R/L) side 3 x 15-20 cts ea; NE tired (shaky)    PERRI over rail x 10 reps; NE             Assessment/HEP:   Tamar Felix continue to ambulate safely with her rolling walker. Explained to patient the need to do balance exercises since she is ambulating more with a rolling walker. She leans backward when standing without assistance of a rolling walker. She stoop forward when she has a rolling walker. She has good KEKE but easily gets unstable when turning her body to either sides. She agreed and understand the shift to balance exercise today. All concerns were addressed at this time. Goals:   (18 visits)  1.  Patient to consistently perform their HEP and it's progression to maintain their improved condition. 2. Patient to have reduced and abolished symptoms to to be able to bend down, rise up from sitting, take the first few steps in the morning, walk, and climb up/donw stairs without producing or increasing symptoms in the (L) groin, lateral hip/buttock, and anterior thigh. 3. Patient to have WNL of (L) Hip AROM in all directions with 4/5 MMT in all motions to promote all home, work, recreational, and functional activities without discomfort or deviation. Plan:   Re-assess next session and continue with (L) LE ROM, strengthening, stretching, gait training, balance training, and HEP progression. Charges:  TherEx x 3       Total Timed Treatment: 42 mins Total Treatment Time: 44 mins

## 2023-01-11 ENCOUNTER — OFFICE VISIT (OUTPATIENT)
Dept: PHYSICAL THERAPY | Age: 83
End: 2023-01-11
Attending: ORTHOPAEDIC SURGERY
Payer: MEDICARE

## 2023-01-11 PROCEDURE — 97110 THERAPEUTIC EXERCISES: CPT | Performed by: PHYSICAL THERAPIST

## 2023-01-11 NOTE — PROGRESS NOTES
Date: 1/6/2023         Dx: Status post open reduction and internal fixation (ORIF) of fracture (Z98.890,Z87.81)  LBP              Authorized # of Visits:  18 + 12 (Rx: 12/8/2022) = 30        Referring MD:  Leonardo Mejia  Next MD visit: none scheduled    Medication Changes since last visit?: No    Subjective: Kate Escobar report that feels \"gallito draggy\" today. She does not report any pain in the (L) hip at this time. She doing her HEP and she is being careful climbing up/down stairs. Objective:  (-) Homans (L) LE;  (-) Tenderness (L) hip/thigh and (R) knee/hip;  (-) Warmth; 0/10 (L) lateral buttock sitting and standing; no pain/ache (L) hip walking with a rolling walker.      Date: 10/12/2022  Tx#: 2/18 Date: 10/14/2022  Tx#: 3/18 Date: 10/19/2022  Tx#: 4/18 Date: 10/21/2022  Tx#: 5/18 Date: 10/25/2022  Tx#: 6/18 Date: 10/27/2022  Tx#: 7/18   Ambulation to bed/table x 20 feet with rolling walker; ache (L) LE during stance phase    Supine: (L) LE hamstring and quadricep isometric 10 reps x 10 cts ea; NE    Supine: (L) LE SLR with belt assist 10 reps x 5-10 cts ea; NE    Supine: hooklying with greenTB 10 reps x 10 cts ea; NE    Supine: (B) LE bridge 10 reps x 10 cts ea; NE    Supine to sit; cueing needed to maintain good form and posture    Gait training with a rolling walker in department; no ache/pain (L) LE (better) Ambulation to bed/table then to NuStep 2 x 20 feet with rolling walker; ache (L) LE during stance phase    NuStep LE only L1 x 10 mins; Dec, B    Ambulation to bed/mat less ache/pain (L) hip/thigh    Supine: (L) LE SLR 10+5 reps x 5 cts ea; NE    Supine: (L) hamstring sets 2 x 10 reps x 10 cts ea; NE tired    Supine: (B) LE bridge with hip abduction greenTB resist 10 reps x 10 cts ea; NE    SLY: (L) LE clam redTB 10 reps x 10 cts ea; NE tired    Seated: (L) hamstring curl redTB 10 reps x 10 cts ea; NE tired/cramping    Gait training with a rolling walker in department; P, NW ache/pain (L) LE (better)    PERRI over rail x 10 reps; NE Ambulation with RW with SBA x 60 feet; P, NW (L) hip    PERRI over rail x 10 reps;  NE    No change (L) hip ache during ambulation    NuStep LE L1-2 x 10 mins; P, NW    Supine: (L) LE SLR 10+5 reps x 10 cts ea; NE (R) thigh muscles working    Supine hooklying: bridge 3 reps x 5 cts ea; P, NW (L) hip    Supine hooklying: (B) LE hip abduction green-blueTB 2 x 10 reps x 10 cts ea; NE    Supine to sit transfer Denise/CGA    Seated: (B) LE abduction blueTB resist 10 reps x 10 cts ea; NE    Ambulation with RW with SBA x 25 feet; same ache (L) hip    Shuttle: (B) leg press 3b 2 x 10 reps; NE    PERRI oer rail x 10 reps; NE    Ambulation with RW with SBA x 25 feet less pain (L) hip  Ambulation with RW with SBA x 60 feet; P, NW (L) hip    NuStep LE only L1-2-3 x 10 mins; NE    PERRI over rail x 10 reps; NE    Supine: (L) SLR 10 reps x 10 cts ea; NE     + 1 lb 2 x 5 reps x 5 cts ea; P, NW (sore)    SLY: Clam greenTB 10 reps x 10 cts ea; NE tired    Shuttle: (B) leg press 3b 2 x 10 reps; NE    Shuttle: (L) leg press  2b x 10 reps; NE    Ambulation with RW with SBA x 25 feet; P, NW             Ambulation with RW with SBA x 30 feet; P, NW (L) hip    PERRI over rail 2 x 10 reps; P, NW (better) mid back     - less pain (L) hip during walking (1/10)    PERRI over rail x 10 reps more; P, NW (better)     - no pain (L) hip during waking    Seated: (R/L) bicep curl 6 lbs x 10 reps ea; NE     Lateral walk with redTB abd resist and CGA/Denise x 5 feet; tired/nervous    PERRI over rail x 10 reps; NE       Ambulation with RW with SBA x 30 feet; P, NW (L) hip    PERRI over rail 2 x 10 reps; Dec, B      - less pain walking    PERRI over rail x 10 reps more; Dec, B     - lesser pain walking (does not feel like giving out anymore)    Sitting posture correction with lumbar roll x 3 mins; NE (feels good)    Pronelying x 5 mins; Dec, A    Prone to sit to stand; no pain/ache    PERRI over rail x 10 reps; NE    - no pain/ache (L) LE walking            Date: 11/2/2022  Tx#: 8/18 Date: 11/9/2022  Tx#: 9/18 Date: 11/11/2022  Tx#: 10/18 Date: 11/16/2022  Tx#: 11/18 Date: 11/18/2022  Tx#: 12/18 Date: 11/29/2022  Tx#: 13/18   Ambulation with RW with SBA x 60 feet; P, NW (L) hip    NuStep LE only L2-3-4 x 10 mins; NE    PERRI over rail x 10 reps; NE    Pronelying x 5 mins; NE    Prone: (L) knee flexion x 10+7 reps; NE    Prone: (L) hip extension 10 reps x 2 cts ea; NE    Step up onto a 6 inch step with (L) LE lead 2 x 10 reps; NE    Ambulation with RW with SBA/Mod I x 60 feet; NE Ambulation with RW with SBA x 60 feet; P, NW (L) hip    NuStep LE only L2-3-4 x 10 mins; NE    PERRI over rail x 10 reps; Dec, NB    Pronelying x 3 mins; Dec, A (L) hip pain    DARRIN x 2 mins; NE (feels good in the (L) hip)    Step up onto a 4 inch step (L) LE lead with (R) hand rail support x 10 reps; NE    Shuttle: (L) LE leg press 3b 2 x 20 reps; NE     Scifit UE only L1 fwd/bkwd x 5 mins ea; NE    PERRI over rail x 10 reps; NE    Supine: (L) SLR 1 lb 2 x 10 reps x 10 cts ea; P, NW    Supine: (B) LE bridge with greenTB abd resist 10 reps x 10 cts ea; NE    Step up onto a 6 inch step (L) LE lead x 10 reps; NE    PERRI over rail x 10 reps; NE Ambulation with RW with SBA x 60 feet; P, NW (L) hip    NuStep LE only L2-3 x 10 mins; NE    Supine: (L) SLR 3 lbs 2 x 10 reps x 10 cts ea; P, NW (less painful)    Supine: (B) LE bridge with blueTB abd resist 10 reps x 10 cts ea; NE    Step up onto a 6 inch step (L) LE lead 2 x 10 reps; NE    Ambulation with RW with SBA/Mod I x 60 feet; NE    PERRI over rail x 10 reps; Dec, NB    Shuttle: (L) leg press 3b x 20+         Ambulation with RW with SBA x 60 feet; P, NW (L) hip    NuStep LE only L2-3-4-6-2 x 10 mins; NE    PERRI over rail x 10 reps; NE      Supine: (L) SLR 3 lbs 2 x 10 reps x 10 cts ea; P, NW (less painful)    Supine: (B) LE bridge with blueTB abd resist 10 reps x 10 cts ea; NE    Supine hooklying:  Alt LE knee lift x 10 reps; P, NW    Step up onto a 6 inch step (L) LE lead 2 x 10 reps; NE    PERRI over rail x 10 reps; NE   Ambulation with RW with SBA x 60 feet; P, NW (L) hip    NuStep LE only L2-3-4-6-2 x 10 mins; NE    Shuttle: (B) LE leg press 5b 2 x 20 reps; NE    Shuttle: (L) LE leg press 4b x 20 reps; NE    Gait training with RW with SBA x 60+30 feet     Step up onto a 6 inch step (L) LE lead with 2 lbs 2 x 10 reps; NE    Hydrant: (L) LE 1 lb ball 6 x 5 cts ea; NE tired    PERRI over rail x 10 reps; NE                        Date:12/1/202  Tx#: 14/18 Date: 12/7/2022  Tx#: 15/18 Date: 12/9/2022  Tx#: 16/18 Date: 12/13/2022  Tx#: 17/18 Date: 12/20/2022  Tx#: 18/30   NuStep LE only L2-3-4-6-2 x 10 mins; NE    Shuttle: (B) LE leg press 5b 2 x 20 reps; NE    Shuttle: (L) LE leg press 3b 2 x 20 reps; NE    Gait training with RW with SBA x 60+30 feet     Step up onto a 6 inch step (L) LE lead with 2 lbs 2 x 10 reps; NE    Seated: (L/R) repeated knee extension 10 reps x 5-10 cts ea; NE tired    PERRI over rail x 10 reps; NE NuStep LE only L2-3-4-5-2 x 10 mins; NE    Shuttle: (B) LE leg press 6b 2 x 20 reps; NE    Shuttle: (L) LE leg press 4b 2 x 10 reps; NE    Gait training with RW with SBA x 30+30+30 feet     Step up onto a 6 inch step (L) LE lead with 4 lbs 2 x 10 reps; NE tired    Seated: (R/L) bicep curls    PERRI over rail x 10 reps; NE NuStep LE only L2-3-4-5-2 x 10 mins; NE    Shuttle: (B) LE leg press 6b 2 x 20 reps; NE    Shuttle: (L) LE leg press 4b 2 x 10 reps; NE    Seated: (L/R) LAQ 10 reps x 10 cts ea; NE    Gait training with RW with SBA x 30+30 feet    Step up onto a 6 inch step (L) LE lead with 4 lbs x 10 reps; NE tired    Seated: (R/L) bicep curls 6 lbs x 20 reps ea; NE tired    PERRI over rail x 10 reps; NE NuStep LE only L2-3-4-5-2 x 10 mins; NE    Shuttle: (B) LE leg press 7b 2 x 10 reps; NE    Shuttle: (L) LE leg press 4b x 15+20 reps; NE    Seated: (L/R) LAQ 2 bs 10 reps x 10 cts ea; NE    Gait training with RW with SBA x 30+30 feet    Step up onto a 6 inch step (L) LE lead with 4 lbs x 10+5 reps; NE tired    Seated: (R/L) bicep curls, arm pulls 6 lbs x 20 reps ea; NE tired    PERRI over rail x 10 reps; NE NuStep LE only L1-2-3-2 x 10 mins; NE tired    Gait training with RW with SBA x 30+30 feet    Supine: (L/R) SLR 3-2 lbs 10 reps x 10 cts ea; P, NW (L)/ (R) NE tired    Supine hooklying: bridge with hip abduction with greenTB resist 10 reps x 10 cts ea; NE tired    Step up onto a 4 inch step with redTB abduction resist (R/L) LE lead x 10 reps; NE tired    Shuttle: (B) LE leg press 6b x 20 reps; NE tired    Shuttle: (R/L) LE leg press 4-3b x 20 reps ea; NE tired    PERRI over rail x 10 reps; NE     Date: 1/4/2023  Tx#: 19/30 Date: 1/6/2023  Tx#: 20/30 Date: 1/11/2023  Tx#: 21/20      NuStep LE only L2-3-4-2 x 10 mins; NE    Shuttle: (B) LE leg press 7b x 20+15 reps; NE    Shuttle: (L) LE leg press 4b x 20+10 reps; NE    PERRI over rail x 10 reps; NE    Step up onto a 6 inch step with greenTB abduction resist (L) LE lead x 10 reps; NE tired/fatigued    Seated: (R/L) bicep curl 7 lbs x 20 reps; NE    PERRI over rail x 10 reps; NE NuStep LE only L2-3-4-5-2 x 10 mins; NE    Shuttle: (B) LE leg press 7b 2 x 20 reps; NE    Shuttle: (L) LE leg press 4b x 20+10 reps; NE    PERRI over rail x 10 reps; NE    bluefoam balance face forward and diagonal (R/L) side 3 x 15-20 cts ea; NE tired (shaky)    PERRI over rail x 10 reps; NE     NuStep LE only L2-3-4-5-# x 10 mins; NE    Shuttle: (B) LE leg press 7b 2 x 20 reps; NE    Shuttle: (L) LE leg press 4b x 20+10 reps; NE    PERRI over rail x 10 reps; NE    bluefoam balance face forward and diagonal (R/L) side 3 x 15-20 cts ea; NE tired (shaky)    Monster walk with greenTB abd resist fwd/bwkd with CGA/Denise 2 laps x 10 feet: NE tired         Assessment/HEP:   Harini required moderate cueing and minimal assist during monster walk. She has a tendency to have a narrow KEKE and to cross her legs.   She also require constant reminder for safety during stand to sit to stand transfer. Goals:   (18 visits)  1. Patient to consistently perform their HEP and it's progression to maintain their improved condition. 2. Patient to have reduced and abolished symptoms to to be able to bend down, rise up from sitting, take the first few steps in the morning, walk, and climb up/donw stairs without producing or increasing symptoms in the (L) groin, lateral hip/buttock, and anterior thigh. 3. Patient to have WNL of (L) Hip AROM in all directions with 4/5 MMT in all motions to promote all home, work, recreational, and functional activities without discomfort or deviation. Plan:   Re-assess next session and continue with (L) LE ROM, strengthening, stretching, gait training, balance training, and HEP progression. Charges:  TherEx x 3       Total Timed Treatment: 45 mins Total Treatment Time: 46 mins

## 2023-01-13 ENCOUNTER — OFFICE VISIT (OUTPATIENT)
Dept: PHYSICAL THERAPY | Age: 83
End: 2023-01-13
Attending: ORTHOPAEDIC SURGERY
Payer: MEDICARE

## 2023-01-13 PROCEDURE — 97110 THERAPEUTIC EXERCISES: CPT | Performed by: PHYSICAL THERAPIST

## 2023-01-13 NOTE — PROGRESS NOTES
Date: 1/13/2023         Dx: Status post open reduction and internal fixation (ORIF) of fracture (Z98.890,Z87.81)  LBP              Authorized # of Visits:  [id number] + 12 (Rx: 12/8/2022) = 30        Referring MD:  Jamila Desai  Next MD visit: none scheduled    Medication Changes since last visit?: No    Subjective: Joann Benson report that her (L) LE feels ok today. She has been walking at home with a rolling walker. Objective:  (-) Homans (L) LE;  (-) Tenderness (L) hip/thigh and (R) knee/hip;  (-) Warmth; 0/10 (L) lateral buttock sitting and standing; no pain/ache (L) hip walking with a rolling walker.      Date: 10/12/2022  Tx#: 2/18 Date: 10/14/2022  Tx#: 3/18 Date: 10/19/2022  Tx#: 4/18 Date: 10/21/2022  Tx#: 5/18 Date: 10/25/2022  Tx#: 6/18 Date: 10/27/2022  Tx#: 7/18   Ambulation to bed/table x 20 feet with rolling walker; ache (L) LE during stance phase    Supine: (L) LE hamstring and quadricep isometric 10 reps x 10 cts ea; NE    Supine: (L) LE SLR with belt assist 10 reps x 5-10 cts ea; NE    Supine: hooklying with greenTB 10 reps x 10 cts ea; NE    Supine: (B) LE bridge 10 reps x 10 cts ea; NE    Supine to sit; cueing needed to maintain good form and posture    Gait training with a rolling walker in department; no ache/pain (L) LE (better) Ambulation to bed/table then to NuStep 2 x 20 feet with rolling walker; ache (L) LE during stance phase    NuStep LE only L1 x 10 mins; Dec, B    Ambulation to bed/mat less ache/pain (L) hip/thigh    Supine: (L) LE SLR 10+5 reps x 5 cts ea; NE    Supine: (L) hamstring sets 2 x 10 reps x 10 cts ea; NE tired    Supine: (B) LE bridge with hip abduction greenTB resist 10 reps x 10 cts ea; NE    SLY: (L) LE clam redTB 10 reps x 10 cts ea; NE tired    Seated: (L) hamstring curl redTB 10 reps x 10 cts ea; NE tired/cramping    Gait training with a rolling walker in department; P, NW ache/pain (L) LE (better)    PERRI over rail x 10 reps; NE Ambulation with RW with SBA x 60 feet; P, NW (L) hip    PERRI over rail x 10 reps;  NE    No change (L) hip ache during ambulation    NuStep LE L1-2 x 10 mins; P, NW    Supine: (L) LE SLR 10+5 reps x 10 cts ea; NE (R) thigh muscles working    Supine hooklying: bridge 3 reps x 5 cts ea; P, NW (L) hip    Supine hooklying: (B) LE hip abduction green-blueTB 2 x 10 reps x 10 cts ea; NE    Supine to sit transfer Denise/CGA    Seated: (B) LE abduction blueTB resist 10 reps x 10 cts ea; NE    Ambulation with RW with SBA x 25 feet; same ache (L) hip    Shuttle: (B) leg press 3b 2 x 10 reps; NE    PERRI oer rail x 10 reps; NE    Ambulation with RW with SBA x 25 feet less pain (L) hip  Ambulation with RW with SBA x 60 feet; P, NW (L) hip    NuStep LE only L1-2-3 x 10 mins; NE    PERRI over rail x 10 reps; NE    Supine: (L) SLR 10 reps x 10 cts ea; NE     + 1 lb 2 x 5 reps x 5 cts ea; P, NW (sore)    SLY: Clam greenTB 10 reps x 10 cts ea; NE tired    Shuttle: (B) leg press 3b 2 x 10 reps; NE    Shuttle: (L) leg press  2b x 10 reps; NE    Ambulation with RW with SBA x 25 feet; P, NW             Ambulation with RW with SBA x 30 feet; P, NW (L) hip    PERRI over rail 2 x 10 reps; P, NW (better) mid back     - less pain (L) hip during walking (1/10)    PERRI over rail x 10 reps more; P, NW (better)     - no pain (L) hip during waking    Seated: (R/L) bicep curl 6 lbs x 10 reps ea; NE     Lateral walk with redTB abd resist and CGA/Denise x 5 feet; tired/nervous    PERRI over rail x 10 reps; NE       Ambulation with RW with SBA x 30 feet; P, NW (L) hip    PERRI over rail 2 x 10 reps; Dec, B      - less pain walking    PERRI over rail x 10 reps more; Dec, B     - lesser pain walking (does not feel like giving out anymore)    Sitting posture correction with lumbar roll x 3 mins; NE (feels good)    Pronelying x 5 mins; Dec, A    Prone to sit to stand; no pain/ache    PERRI over rail x 10 reps; NE    - no pain/ache (L) LE walking            Date: 11/2/2022  Tx#: 8/18 Date: 11/9/2022  Tx#: 9/18 Date: 11/11/2022  Tx#: 10/18 Date: 11/16/2022  Tx#: 11/18 Date: 11/18/2022  Tx#: 12/18 Date: 11/29/2022  Tx#: 13/18   Ambulation with RW with SBA x 60 feet; P, NW (L) hip    NuStep LE only L2-3-4 x 10 mins; NE    PERRI over rail x 10 reps; NE    Pronelying x 5 mins; NE    Prone: (L) knee flexion x 10+7 reps; NE    Prone: (L) hip extension 10 reps x 2 cts ea; NE    Step up onto a 6 inch step with (L) LE lead 2 x 10 reps; NE    Ambulation with RW with SBA/Mod I x 60 feet; NE Ambulation with RW with SBA x 60 feet; P, NW (L) hip    NuStep LE only L2-3-4 x 10 mins; NE    PERRI over rail x 10 reps; Dec, NB    Pronelying x 3 mins; Dec, A (L) hip pain    DARRIN x 2 mins; NE (feels good in the (L) hip)    Step up onto a 4 inch step (L) LE lead with (R) hand rail support x 10 reps; NE    Shuttle: (L) LE leg press 3b 2 x 20 reps; NE     Scifit UE only L1 fwd/bkwd x 5 mins ea; NE    PERRI over rail x 10 reps; NE    Supine: (L) SLR 1 lb 2 x 10 reps x 10 cts ea; P, NW    Supine: (B) LE bridge with greenTB abd resist 10 reps x 10 cts ea; NE    Step up onto a 6 inch step (L) LE lead x 10 reps; NE    PERRI over rail x 10 reps; NE Ambulation with RW with SBA x 60 feet; P, NW (L) hip    NuStep LE only L2-3 x 10 mins; NE    Supine: (L) SLR 3 lbs 2 x 10 reps x 10 cts ea; P, NW (less painful)    Supine: (B) LE bridge with blueTB abd resist 10 reps x 10 cts ea; NE    Step up onto a 6 inch step (L) LE lead 2 x 10 reps; NE    Ambulation with RW with SBA/Mod I x 60 feet; NE    PERRI over rail x 10 reps; Dec, NB    Shuttle: (L) leg press 3b x 20+         Ambulation with RW with SBA x 60 feet; P, NW (L) hip    NuStep LE only L2-3-4-6-2 x 10 mins; NE    PERRI over rail x 10 reps; NE      Supine: (L) SLR 3 lbs 2 x 10 reps x 10 cts ea; P, NW (less painful)    Supine: (B) LE bridge with blueTB abd resist 10 reps x 10 cts ea; NE    Supine hooklying:  Alt LE knee lift x 10 reps; P, NW    Step up onto a 6 inch step (L) LE lead 2 x 10 reps; NE    PERRI over rail x 10 reps; NE   Ambulation with RW with SBA x 60 feet; P, NW (L) hip    NuStep LE only L2-3-4-6-2 x 10 mins; NE    Shuttle: (B) LE leg press 5b 2 x 20 reps; NE    Shuttle: (L) LE leg press 4b x 20 reps; NE    Gait training with RW with SBA x 60+30 feet     Step up onto a 6 inch step (L) LE lead with 2 lbs 2 x 10 reps; NE    Hydrant: (L) LE 1 lb ball 6 x 5 cts ea; NE tired    PERRI over rail x 10 reps; NE                        Date:12/1/202  Tx#: 14/18 Date: 12/7/2022  Tx#: 15/18 Date: 12/9/2022  Tx#: 16/18 Date: 12/13/2022  Tx#: 17/18 Date: 12/20/2022  Tx#: 18/30   NuStep LE only L2-3-4-6-2 x 10 mins; NE    Shuttle: (B) LE leg press 5b 2 x 20 reps; NE    Shuttle: (L) LE leg press 3b 2 x 20 reps; NE    Gait training with RW with SBA x 60+30 feet     Step up onto a 6 inch step (L) LE lead with 2 lbs 2 x 10 reps; NE    Seated: (L/R) repeated knee extension 10 reps x 5-10 cts ea; NE tired    PERRI over rail x 10 reps; NE NuStep LE only L2-3-4-5-2 x 10 mins; NE    Shuttle: (B) LE leg press 6b 2 x 20 reps; NE    Shuttle: (L) LE leg press 4b 2 x 10 reps; NE    Gait training with RW with SBA x 30+30+30 feet     Step up onto a 6 inch step (L) LE lead with 4 lbs 2 x 10 reps; NE tired    Seated: (R/L) bicep curls    PERRI over rail x 10 reps; NE NuStep LE only L2-3-4-5-2 x 10 mins; NE    Shuttle: (B) LE leg press 6b 2 x 20 reps; NE    Shuttle: (L) LE leg press 4b 2 x 10 reps; NE    Seated: (L/R) LAQ 10 reps x 10 cts ea; NE    Gait training with RW with SBA x 30+30 feet    Step up onto a 6 inch step (L) LE lead with 4 lbs x 10 reps; NE tired    Seated: (R/L) bicep curls 6 lbs x 20 reps ea; NE tired    PERRI over rail x 10 reps; NE NuStep LE only L2-3-4-5-2 x 10 mins; NE    Shuttle: (B) LE leg press 7b 2 x 10 reps; NE    Shuttle: (L) LE leg press 4b x 15+20 reps; NE    Seated: (L/R) LAQ 2 bs 10 reps x 10 cts ea; NE    Gait training with RW with SBA x 30+30 feet    Step up onto a 6 inch step (L) LE lead with 4 lbs x 10+5 reps; NE tired    Seated: (R/L) bicep curls, arm pulls 6 lbs x 20 reps ea; NE tired    PERRI over rail x 10 reps; NE NuStep LE only L1-2-3-2 x 10 mins; NE tired    Gait training with RW with SBA x 30+30 feet    Supine: (L/R) SLR 3-2 lbs 10 reps x 10 cts ea; P, NW (L)/ (R) NE tired    Supine hooklying: bridge with hip abduction with greenTB resist 10 reps x 10 cts ea; NE tired    Step up onto a 4 inch step with redTB abduction resist (R/L) LE lead x 10 reps; NE tired    Shuttle: (B) LE leg press 6b x 20 reps; NE tired    Shuttle: (R/L) LE leg press 4-3b x 20 reps ea; NE tired    PERRI over rail x 10 reps; NE     Date: 1/4/2023  Tx#: 19/30 Date: 1/6/2023  Tx#: 20/30 Date: 1/11/2023  Tx#: 21/30 Date: 1/13/2023  Tx#: 22/30     NuStep LE only L2-3-4-2 x 10 mins; NE    Shuttle: (B) LE leg press 7b x 20+15 reps; NE    Shuttle: (L) LE leg press 4b x 20+10 reps; NE    PERRI over rail x 10 reps; NE    Step up onto a 6 inch step with greenTB abduction resist (L) LE lead x 10 reps; NE tired/fatigued    Seated: (R/L) bicep curl 7 lbs x 20 reps; NE    PERRI over rail x 10 reps; NE NuStep LE only L2-3-4-5-2 x 10 mins; NE    Shuttle: (B) LE leg press 7b 2 x 20 reps; NE    Shuttle: (L) LE leg press 4b x 20+10 reps; NE    PERRI over rail x 10 reps; NE    bluefoam balance face forward and diagonal (R/L) side 3 x 15-20 cts ea; NE tired (shaky)    EPRRI over rail x 10 reps; NE     NuStep LE only L2-3-4-5-# x 10 mins; NE    Shuttle: (B) LE leg press 7b 2 x 20 reps; NE    Shuttle: (L) LE leg press 4b 2 x 20 reps; NE    PERRI over rail x 10 reps; NE    bluefoam balance face forward and diagonal (R/L) side 3 x 15-20 cts ea; NE tired (shaky)    Monster walk with greenTB abd resist fwd/bwkd with CGA/Denise 2 laps x 10 feet: NE tired  NuStep LE only L2-3-4-5-# x 10 mins; NE    Shuttle: (B) LE leg press 7b 2 x 20 reps; NE    Shuttle: (L) LE leg press 4b 2 x 20 reps; NE    PERRI over rail x 10 reps; NE    Supine: (L) LE SLR 2 sets x 10 reps x 10 cts ea; NE tired    Supine: (B) LE bridging 2 sets x 10 reps x 10 cts ea; NE tired    Monster walk with greenTB abd resist fwd/bwkd with CGA/Denise 2 laps x 10 feet: NE tired    Seated: (R/L) UE bicep curl 7 lbs x 20 reps; NE     PERRI over rail x 10 reps; NE       Assessment/HEP:   Dwight Bailey felt ache/pressure on the (L) hip during exercises but no worse as a result. She remained painfree in the (L) hip and WFL of (L) hip AROM in all directions. She was reviewed and reminded to her supine HEP to improve (L) LE strength while she is safely laying on her back (supine). She is consistently walking with a rolling walker safely while in the department. All questions and concerns were answered and addressed at this time. Goals:   (18 visits)  1. Patient to consistently perform their HEP and it's progression to maintain their improved condition. 2. Patient to have reduced and abolished symptoms to to be able to bend down, rise up from sitting, take the first few steps in the morning, walk, and climb up/donw stairs without producing or increasing symptoms in the (L) groin, lateral hip/buttock, and anterior thigh. 3. Patient to have WNL of (L) Hip AROM in all directions with 4/5 MMT in all motions to promote all home, work, recreational, and functional activities without discomfort or deviation. Plan:   Re-assess next session and continue with (L) LE ROM, strengthening, stretching, gait training, balance training, and HEP progression. Charges:  TherEx x 4       Total Timed Treatment: 54 mins Total Treatment Time: 56 mins

## 2023-01-18 ENCOUNTER — OFFICE VISIT (OUTPATIENT)
Dept: PHYSICAL THERAPY | Age: 83
End: 2023-01-18
Attending: ORTHOPAEDIC SURGERY
Payer: MEDICARE

## 2023-01-18 PROCEDURE — 97110 THERAPEUTIC EXERCISES: CPT | Performed by: PHYSICAL THERAPIST

## 2023-01-18 NOTE — PROGRESS NOTES
Date: 1/18/2023         Dx: Status post open reduction and internal fixation (ORIF) of fracture (Z98.890,Z87.81)  LBP              Authorized # of Visits:  18 + 12 (Rx: 12/8/2022) = 30        Referring MD:  Destin Rangel  Next MD visit: none scheduled    Medication Changes since last visit?: No    Subjective: Tamar Felix report that her (L) LE occasionally still feel achy and sore. At this time she is feeling fine sitting  She state that she did her laying down exercises yesterday. She gets tired in the (L) hip/LE easily. Objective:  (-) Homans (L) LE;  (-) Tenderness (L) hip/thigh and (R) knee/hip;  (-) Warmth; 0/10 (L) lateral buttock sitting and standing; no pain/ache (L) hip walking with a rolling walker.      Date: 10/12/2022  Tx#: 2/18 Date: 10/14/2022  Tx#: 3/18 Date: 10/19/2022  Tx#: 4/18 Date: 10/21/2022  Tx#: 5/18 Date: 10/25/2022  Tx#: 6/18 Date: 10/27/2022  Tx#: 7/18   Ambulation to bed/table x 20 feet with rolling walker; ache (L) LE during stance phase    Supine: (L) LE hamstring and quadricep isometric 10 reps x 10 cts ea; NE    Supine: (L) LE SLR with belt assist 10 reps x 5-10 cts ea; NE    Supine: hooklying with greenTB 10 reps x 10 cts ea; NE    Supine: (B) LE bridge 10 reps x 10 cts ea; NE    Supine to sit; cueing needed to maintain good form and posture    Gait training with a rolling walker in department; no ache/pain (L) LE (better) Ambulation to bed/table then to NuStep 2 x 20 feet with rolling walker; ache (L) LE during stance phase    NuStep LE only L1 x 10 mins; Dec, B    Ambulation to bed/mat less ache/pain (L) hip/thigh    Supine: (L) LE SLR 10+5 reps x 5 cts ea; NE    Supine: (L) hamstring sets 2 x 10 reps x 10 cts ea; NE tired    Supine: (B) LE bridge with hip abduction greenTB resist 10 reps x 10 cts ea; NE    SLY: (L) LE clam redTB 10 reps x 10 cts ea; NE tired    Seated: (L) hamstring curl redTB 10 reps x 10 cts ea; NE tired/cramping    Gait training with a rolling walker in department; P, NW ache/pain (L) LE (better)    PERRI over rail x 10 reps; NE Ambulation with RW with SBA x 60 feet; P, NW (L) hip    PERRI over rail x 10 reps;  NE    No change (L) hip ache during ambulation    NuStep LE L1-2 x 10 mins; P, NW    Supine: (L) LE SLR 10+5 reps x 10 cts ea; NE (R) thigh muscles working    Supine hooklying: bridge 3 reps x 5 cts ea; P, NW (L) hip    Supine hooklying: (B) LE hip abduction green-blueTB 2 x 10 reps x 10 cts ea; NE    Supine to sit transfer Denise/CGA    Seated: (B) LE abduction blueTB resist 10 reps x 10 cts ea; NE    Ambulation with RW with SBA x 25 feet; same ache (L) hip    Shuttle: (B) leg press 3b 2 x 10 reps; NE    PERRI oer rail x 10 reps; NE    Ambulation with RW with SBA x 25 feet less pain (L) hip  Ambulation with RW with SBA x 60 feet; P, NW (L) hip    NuStep LE only L1-2-3 x 10 mins; NE    PERRI over rail x 10 reps; NE    Supine: (L) SLR 10 reps x 10 cts ea; NE     + 1 lb 2 x 5 reps x 5 cts ea; P, NW (sore)    SLY: Clam greenTB 10 reps x 10 cts ea; NE tired    Shuttle: (B) leg press 3b 2 x 10 reps; NE    Shuttle: (L) leg press  2b x 10 reps; NE    Ambulation with RW with SBA x 25 feet; P, NW             Ambulation with RW with SBA x 30 feet; P, NW (L) hip    PERRI over rail 2 x 10 reps; P, NW (better) mid back     - less pain (L) hip during walking (1/10)    PERRI over rail x 10 reps more; P, NW (better)     - no pain (L) hip during waking    Seated: (R/L) bicep curl 6 lbs x 10 reps ea; NE     Lateral walk with redTB abd resist and CGA/Denise x 5 feet; tired/nervous    PERRI over rail x 10 reps; NE       Ambulation with RW with SBA x 30 feet; P, NW (L) hip    PERRI over rail 2 x 10 reps; Dec, B      - less pain walking    PERRI over rail x 10 reps more; Dec, B     - lesser pain walking (does not feel like giving out anymore)    Sitting posture correction with lumbar roll x 3 mins; NE (feels good)    Pronelying x 5 mins; Dec, A    Prone to sit to stand; no pain/ache    PERRI over rail x 10 reps; NE    - no pain/ache (L) LE walking            Date: 11/2/2022  Tx#: 8/18 Date: 11/9/2022  Tx#: 9/18 Date: 11/11/2022  Tx#: 10/18 Date: 11/16/2022  Tx#: 11/18 Date: 11/18/2022  Tx#: 12/18 Date: 11/29/2022  Tx#: 13/18   Ambulation with RW with SBA x 60 feet; P, NW (L) hip    NuStep LE only L2-3-4 x 10 mins; NE    PERRI over rail x 10 reps; NE    Pronelying x 5 mins; NE    Prone: (L) knee flexion x 10+7 reps; NE    Prone: (L) hip extension 10 reps x 2 cts ea; NE    Step up onto a 6 inch step with (L) LE lead 2 x 10 reps; NE    Ambulation with RW with SBA/Mod I x 60 feet; NE Ambulation with RW with SBA x 60 feet; P, NW (L) hip    NuStep LE only L2-3-4 x 10 mins; NE    PERRI over rail x 10 reps; Dec, NB    Pronelying x 3 mins; Dec, A (L) hip pain    DARRIN x 2 mins; NE (feels good in the (L) hip)    Step up onto a 4 inch step (L) LE lead with (R) hand rail support x 10 reps; NE    Shuttle: (L) LE leg press 3b 2 x 20 reps; NE     Scifit UE only L1 fwd/bkwd x 5 mins ea; NE    PERRI over rail x 10 reps; NE    Supine: (L) SLR 1 lb 2 x 10 reps x 10 cts ea; P, NW    Supine: (B) LE bridge with greenTB abd resist 10 reps x 10 cts ea; NE    Step up onto a 6 inch step (L) LE lead x 10 reps; NE    PERRI over rail x 10 reps; NE Ambulation with RW with SBA x 60 feet; P, NW (L) hip    NuStep LE only L2-3 x 10 mins; NE    Supine: (L) SLR 3 lbs 2 x 10 reps x 10 cts ea; P, NW (less painful)    Supine: (B) LE bridge with blueTB abd resist 10 reps x 10 cts ea; NE    Step up onto a 6 inch step (L) LE lead 2 x 10 reps; NE    Ambulation with RW with SBA/Mod I x 60 feet; NE    PERRI over rail x 10 reps; Dec, NB    Shuttle: (L) leg press 3b x 20+         Ambulation with RW with SBA x 60 feet; P, NW (L) hip    NuStep LE only L2-3-4-6-2 x 10 mins; NE    PERRI over rail x 10 reps; NE      Supine: (L) SLR 3 lbs 2 x 10 reps x 10 cts ea; P, NW (less painful)    Supine: (B) LE bridge with blueTB abd resist 10 reps x 10 cts ea; NE    Supine hooklying:  Alt LE knee lift x 10 reps; P, NW    Step up onto a 6 inch step (L) LE lead 2 x 10 reps; NE    PERRI over rail x 10 reps; NE   Ambulation with RW with SBA x 60 feet; P, NW (L) hip    NuStep LE only L2-3-4-6-2 x 10 mins; NE    Shuttle: (B) LE leg press 5b 2 x 20 reps; NE    Shuttle: (L) LE leg press 4b x 20 reps; NE    Gait training with RW with SBA x 60+30 feet     Step up onto a 6 inch step (L) LE lead with 2 lbs 2 x 10 reps; NE    Hydrant: (L) LE 1 lb ball 6 x 5 cts ea; NE tired    PERRI over rail x 10 reps; NE                        Date:12/1/202  Tx#: 14/18 Date: 12/7/2022  Tx#: 15/18 Date: 12/9/2022  Tx#: 16/18 Date: 12/13/2022  Tx#: 17/18 Date: 12/20/2022  Tx#: 18/30   NuStep LE only L2-3-4-6-2 x 10 mins; NE    Shuttle: (B) LE leg press 5b 2 x 20 reps; NE    Shuttle: (L) LE leg press 3b 2 x 20 reps; NE    Gait training with RW with SBA x 60+30 feet     Step up onto a 6 inch step (L) LE lead with 2 lbs 2 x 10 reps; NE    Seated: (L/R) repeated knee extension 10 reps x 5-10 cts ea; NE tired    PERRI over rail x 10 reps; NE NuStep LE only L2-3-4-5-2 x 10 mins; NE    Shuttle: (B) LE leg press 6b 2 x 20 reps; NE    Shuttle: (L) LE leg press 4b 2 x 10 reps; NE    Gait training with RW with SBA x 30+30+30 feet     Step up onto a 6 inch step (L) LE lead with 4 lbs 2 x 10 reps; NE tired    Seated: (R/L) bicep curls    PERRI over rail x 10 reps; NE NuStep LE only L2-3-4-5-2 x 10 mins; NE    Shuttle: (B) LE leg press 6b 2 x 20 reps; NE    Shuttle: (L) LE leg press 4b 2 x 10 reps; NE    Seated: (L/R) LAQ 10 reps x 10 cts ea; NE    Gait training with RW with SBA x 30+30 feet    Step up onto a 6 inch step (L) LE lead with 4 lbs x 10 reps; NE tired    Seated: (R/L) bicep curls 6 lbs x 20 reps ea; NE tired    PERRI over rail x 10 reps; NE NuStep LE only L2-3-4-5-2 x 10 mins; NE    Shuttle: (B) LE leg press 7b 2 x 10 reps; NE    Shuttle: (L) LE leg press 4b x 15+20 reps; NE    Seated: (L/R) LAQ 2 bs 10 reps x 10 cts ea; NE    Gait training with RW with SBA x 30+30 feet    Step up onto a 6 inch step (L) LE lead with 4 lbs x 10+5 reps; NE tired    Seated: (R/L) bicep curls, arm pulls 6 lbs x 20 reps ea; NE tired    PERRI over rail x 10 reps; NE NuStep LE only L1-2-3-2 x 10 mins; NE tired    Gait training with RW with SBA x 30+30 feet    Supine: (L/R) SLR 3-2 lbs 10 reps x 10 cts ea; P, NW (L)/ (R) NE tired    Supine hooklying: bridge with hip abduction with greenTB resist 10 reps x 10 cts ea; NE tired    Step up onto a 4 inch step with redTB abduction resist (R/L) LE lead x 10 reps; NE tired    Shuttle: (B) LE leg press 6b x 20 reps; NE tired    Shuttle: (R/L) LE leg press 4-3b x 20 reps ea; NE tired    PERRI over rail x 10 reps; NE     Date: 1/4/2023  Tx#: 19/30 Date: 1/6/2023  Tx#: 20/30 Date: 1/11/2023  Tx#: 21/30 Date: 1/13/2023  Tx#: 22/30 Date: 1/18/2023  Tx#: 23/30    NuStep LE only L2-3-4-2 x 10 mins; NE    Shuttle: (B) LE leg press 7b x 20+15 reps; NE    Shuttle: (L) LE leg press 4b x 20+10 reps; NE    PERRI over rail x 10 reps; NE    Step up onto a 6 inch step with greenTB abduction resist (L) LE lead x 10 reps; NE tired/fatigued    Seated: (R/L) bicep curl 7 lbs x 20 reps; NE    PERRI over rail x 10 reps; NE NuStep LE only L2-3-4-5-2 x 10 mins; NE    Shuttle: (B) LE leg press 7b 2 x 20 reps; NE    Shuttle: (L) LE leg press 4b x 20+10 reps; NE    PERRI over rail x 10 reps; NE    bluefoam balance face forward and diagonal (R/L) side 3 x 15-20 cts ea; NE tired (shaky)    PERRI over rail x 10 reps; NE     NuStep LE only L2-3-4-5-# x 10 mins; NE    Shuttle: (B) LE leg press 7b 2 x 20 reps; NE    Shuttle: (L) LE leg press 4b 2 x 20 reps; NE    PERRI over rail x 10 reps; NE    bluefoam balance face forward and diagonal (R/L) side 3 x 15-20 cts ea; NE tired (shaky)    Monster walk with greenTB abd resist fwd/bwkd with CGA/Denise 2 laps x 10 feet: NE tired  NuStep LE only L2-3-4-5-# x 10 mins; NE    Shuttle: (B) LE leg press 7b 2 x 20 reps; NE    Shuttle: (L) LE leg press 4b 2 x 20 reps; NE    PERRI over rail x 10 reps; NE    Supine: (L) LE SLR 2 sets x 10 reps x 10 cts ea; NE tired    Supine: (B) LE bridging 2 sets x 10 reps x 10 cts ea; NE tired    Monster walk with greenTB abd resist fwd/bwkd with CGA/Denise 2 laps x 10 feet: NE tired    Seated: (R/L) UE bicep curl 7 lbs x 20 reps; NE     PERRI over rail x 10 reps; NE NuStep LE only L2-3-4-5-6 x 10 mins; NE    Shuttle: (B) LE leg press 8b x 20 reps; NE    Shuttle: (R) LE leg press 5b x 20 reps; NE    Shuttle: (L) LE leg press 4b 2 x 20 reps; NE    Step up onto a 6 inch step with blueTB abduction resist (L) LE lead 2 x 10 reps; NE tired/fatigued    Bluefoam balance forward facing with (B) LE together 3 x 20 cts ea; NE tired    Lateral walk with greenTB abduction resist 1 lap x 10 feet with CGA; NE tired    Seated: (R/L) bicep curl 7 lbs x 20 reps ea; NE    PERRI over rail x 10 reps; NE        Assessment/HEP:   Kiley Lima did not report any pain in the (L) hip but felt tired and fatigued after her session. She was progressed with increased resistance and repetitions with (L) LE strengthening exercises. She required minimal cueing to maintain a wider KEKE and to equalize weight bearing on (B) feet and to distribute her weight on the heels and toes equally. All concerns were addressed at this time. Goals:   (18 visits)  1. Patient to consistently perform their HEP and it's progression to maintain their improved condition. 2. Patient to have reduced and abolished symptoms to to be able to bend down, rise up from sitting, take the first few steps in the morning, walk, and climb up/donw stairs without producing or increasing symptoms in the (L) groin, lateral hip/buttock, and anterior thigh. 3. Patient to have WNL of (L) Hip AROM in all directions with 4/5 MMT in all motions to promote all home, work, recreational, and functional activities without discomfort or deviation.      Plan:   Re-assess next session and continue with (L) LE ROM, strengthening, stretching, gait training, balance training, and HEP progression. Charges:  TherEx x 4       Total Timed Treatment: 55 mins Total Treatment Time: 56 mins

## 2023-01-20 ENCOUNTER — OFFICE VISIT (OUTPATIENT)
Dept: PHYSICAL THERAPY | Age: 83
End: 2023-01-20
Attending: ORTHOPAEDIC SURGERY
Payer: MEDICARE

## 2023-01-20 PROCEDURE — 97110 THERAPEUTIC EXERCISES: CPT | Performed by: PHYSICAL THERAPIST

## 2023-01-20 NOTE — PROGRESS NOTES
Date: 1/20/2023         Dx: Status post open reduction and internal fixation (ORIF) of fracture (Z98.890,Z87.81)  LBP              Authorized # of Visits:  [id number] + 12 (Rx: 12/8/2022) = 30        Referring MD:  Digna Tirado MD visit: none scheduled    Medication Changes since last visit?: No    Subjective: Dionne Has report that she feels good today. She continue to do her bed exercises. She was not too sore after her last session. Objective:  (-) Homans (L) LE;  (-) Tenderness (L) hip/thigh and (R) knee/hip;  (-) Warmth; 0/10 (L) lateral buttock sitting and standing; no pain/ache (L) hip walking with a rolling walker.      Date: 10/12/2022  Tx#: 2/18 Date: 10/14/2022  Tx#: 3/18 Date: 10/19/2022  Tx#: 4/18 Date: 10/21/2022  Tx#: 5/18 Date: 10/25/2022  Tx#: 6/18 Date: 10/27/2022  Tx#: 7/18   Ambulation to bed/table x 20 feet with rolling walker; ache (L) LE during stance phase    Supine: (L) LE hamstring and quadricep isometric 10 reps x 10 cts ea; NE    Supine: (L) LE SLR with belt assist 10 reps x 5-10 cts ea; NE    Supine: hooklying with greenTB 10 reps x 10 cts ea; NE    Supine: (B) LE bridge 10 reps x 10 cts ea; NE    Supine to sit; cueing needed to maintain good form and posture    Gait training with a rolling walker in department; no ache/pain (L) LE (better) Ambulation to bed/table then to NuStep 2 x 20 feet with rolling walker; ache (L) LE during stance phase    NuStep LE only L1 x 10 mins; Dec, B    Ambulation to bed/mat less ache/pain (L) hip/thigh    Supine: (L) LE SLR 10+5 reps x 5 cts ea; NE    Supine: (L) hamstring sets 2 x 10 reps x 10 cts ea; NE tired    Supine: (B) LE bridge with hip abduction greenTB resist 10 reps x 10 cts ea; NE    SLY: (L) LE clam redTB 10 reps x 10 cts ea; NE tired    Seated: (L) hamstring curl redTB 10 reps x 10 cts ea; NE tired/cramping    Gait training with a rolling walker in department; P, NW ache/pain (L) LE (better)    PERRI over rail x 10 reps; NE Ambulation with RW with SBA x 60 feet; P, NW (L) hip    PERRI over rail x 10 reps;  NE    No change (L) hip ache during ambulation    NuStep LE L1-2 x 10 mins; P, NW    Supine: (L) LE SLR 10+5 reps x 10 cts ea; NE (R) thigh muscles working    Supine hooklying: bridge 3 reps x 5 cts ea; P, NW (L) hip    Supine hooklying: (B) LE hip abduction green-blueTB 2 x 10 reps x 10 cts ea; NE    Supine to sit transfer Denise/CGA    Seated: (B) LE abduction blueTB resist 10 reps x 10 cts ea; NE    Ambulation with RW with SBA x 25 feet; same ache (L) hip    Shuttle: (B) leg press 3b 2 x 10 reps; NE    PERRI oer rail x 10 reps; NE    Ambulation with RW with SBA x 25 feet less pain (L) hip  Ambulation with RW with SBA x 60 feet; P, NW (L) hip    NuStep LE only L1-2-3 x 10 mins; NE    PERRI over rail x 10 reps; NE    Supine: (L) SLR 10 reps x 10 cts ea; NE     + 1 lb 2 x 5 reps x 5 cts ea; P, NW (sore)    SLY: Clam greenTB 10 reps x 10 cts ea; NE tired    Shuttle: (B) leg press 3b 2 x 10 reps; NE    Shuttle: (L) leg press  2b x 10 reps; NE    Ambulation with RW with SBA x 25 feet; P, NW             Ambulation with RW with SBA x 30 feet; P, NW (L) hip    PERRI over rail 2 x 10 reps; P, NW (better) mid back     - less pain (L) hip during walking (1/10)    PERRI over rail x 10 reps more; P, NW (better)     - no pain (L) hip during waking    Seated: (R/L) bicep curl 6 lbs x 10 reps ea; NE     Lateral walk with redTB abd resist and CGA/Denise x 5 feet; tired/nervous    PERRI over rail x 10 reps; NE       Ambulation with RW with SBA x 30 feet; P, NW (L) hip    PERRI over rail 2 x 10 reps; Dec, B      - less pain walking    PERRI over rail x 10 reps more; Dec, B     - lesser pain walking (does not feel like giving out anymore)    Sitting posture correction with lumbar roll x 3 mins; NE (feels good)    Pronelying x 5 mins; Dec, A    Prone to sit to stand; no pain/ache    PERRI over rail x 10 reps; NE    - no pain/ache (L) LE walking            Date: 11/2/2022  Tx#: 8/18 Date: 11/9/2022  Tx#: 9/18 Date: 11/11/2022  Tx#: 10/18 Date: 11/16/2022  Tx#: 11/18 Date: 11/18/2022  Tx#: 12/18 Date: 11/29/2022  Tx#: 13/18   Ambulation with RW with SBA x 60 feet; P, NW (L) hip    NuStep LE only L2-3-4 x 10 mins; NE    PERRI over rail x 10 reps; NE    Pronelying x 5 mins; NE    Prone: (L) knee flexion x 10+7 reps; NE    Prone: (L) hip extension 10 reps x 2 cts ea; NE    Step up onto a 6 inch step with (L) LE lead 2 x 10 reps; NE    Ambulation with RW with SBA/Mod I x 60 feet; NE Ambulation with RW with SBA x 60 feet; P, NW (L) hip    NuStep LE only L2-3-4 x 10 mins; NE    PERRI over rail x 10 reps; Dec, NB    Pronelying x 3 mins; Dec, A (L) hip pain    DARRIN x 2 mins; NE (feels good in the (L) hip)    Step up onto a 4 inch step (L) LE lead with (R) hand rail support x 10 reps; NE    Shuttle: (L) LE leg press 3b 2 x 20 reps; NE     Scifit UE only L1 fwd/bkwd x 5 mins ea; NE    PERRI over rail x 10 reps; NE    Supine: (L) SLR 1 lb 2 x 10 reps x 10 cts ea; P, NW    Supine: (B) LE bridge with greenTB abd resist 10 reps x 10 cts ea; NE    Step up onto a 6 inch step (L) LE lead x 10 reps; NE    PERRI over rail x 10 reps; NE Ambulation with RW with SBA x 60 feet; P, NW (L) hip    NuStep LE only L2-3 x 10 mins; NE    Supine: (L) SLR 3 lbs 2 x 10 reps x 10 cts ea; P, NW (less painful)    Supine: (B) LE bridge with blueTB abd resist 10 reps x 10 cts ea; NE    Step up onto a 6 inch step (L) LE lead 2 x 10 reps; NE    Ambulation with RW with SBA/Mod I x 60 feet; NE    PERRI over rail x 10 reps; Dec, NB    Shuttle: (L) leg press 3b x 20+         Ambulation with RW with SBA x 60 feet; P, NW (L) hip    NuStep LE only L2-3-4-6-2 x 10 mins; NE    PERRI over rail x 10 reps; NE      Supine: (L) SLR 3 lbs 2 x 10 reps x 10 cts ea; P, NW (less painful)    Supine: (B) LE bridge with blueTB abd resist 10 reps x 10 cts ea; NE    Supine hooklying:  Alt LE knee lift x 10 reps; P, NW    Step up onto a 6 inch step (L) LE lead 2 x 10 reps; NE    PERRI over rail x 10 reps; NE   Ambulation with RW with SBA x 60 feet; P, NW (L) hip    NuStep LE only L2-3-4-6-2 x 10 mins; NE    Shuttle: (B) LE leg press 5b 2 x 20 reps; NE    Shuttle: (L) LE leg press 4b x 20 reps; NE    Gait training with RW with SBA x 60+30 feet     Step up onto a 6 inch step (L) LE lead with 2 lbs 2 x 10 reps; NE    Hydrant: (L) LE 1 lb ball 6 x 5 cts ea; NE tired    PERRI over rail x 10 reps; NE                        Date:12/1/202  Tx#: 14/18 Date: 12/7/2022  Tx#: 15/18 Date: 12/9/2022  Tx#: 16/18 Date: 12/13/2022  Tx#: 17/18 Date: 12/20/2022  Tx#: 18/30   NuStep LE only L2-3-4-6-2 x 10 mins; NE    Shuttle: (B) LE leg press 5b 2 x 20 reps; NE    Shuttle: (L) LE leg press 3b 2 x 20 reps; NE    Gait training with RW with SBA x 60+30 feet     Step up onto a 6 inch step (L) LE lead with 2 lbs 2 x 10 reps; NE    Seated: (L/R) repeated knee extension 10 reps x 5-10 cts ea; NE tired    PERRI over rail x 10 reps; NE NuStep LE only L2-3-4-5-2 x 10 mins; NE    Shuttle: (B) LE leg press 6b 2 x 20 reps; NE    Shuttle: (L) LE leg press 4b 2 x 10 reps; NE    Gait training with RW with SBA x 30+30+30 feet     Step up onto a 6 inch step (L) LE lead with 4 lbs 2 x 10 reps; NE tired    Seated: (R/L) bicep curls    PERRI over rail x 10 reps; NE NuStep LE only L2-3-4-5-2 x 10 mins; NE    Shuttle: (B) LE leg press 6b 2 x 20 reps; NE    Shuttle: (L) LE leg press 4b 2 x 10 reps; NE    Seated: (L/R) LAQ 10 reps x 10 cts ea; NE    Gait training with RW with SBA x 30+30 feet    Step up onto a 6 inch step (L) LE lead with 4 lbs x 10 reps; NE tired    Seated: (R/L) bicep curls 6 lbs x 20 reps ea; NE tired    PERRI over rail x 10 reps; NE NuStep LE only L2-3-4-5-2 x 10 mins; NE    Shuttle: (B) LE leg press 7b 2 x 10 reps; NE    Shuttle: (L) LE leg press 4b x 15+20 reps; NE    Seated: (L/R) LAQ 2 bs 10 reps x 10 cts ea; NE    Gait training with RW with SBA x 30+30 feet    Step up onto a 6 inch step (L) LE lead with 4 lbs x 10+5 reps; NE tired    Seated: (R/L) bicep curls, arm pulls 6 lbs x 20 reps ea; NE tired    PERRI over rail x 10 reps; NE NuStep LE only L1-2-3-2 x 10 mins; NE tired    Gait training with RW with SBA x 30+30 feet    Supine: (L/R) SLR 3-2 lbs 10 reps x 10 cts ea; P, NW (L)/ (R) NE tired    Supine hooklying: bridge with hip abduction with greenTB resist 10 reps x 10 cts ea; NE tired    Step up onto a 4 inch step with redTB abduction resist (R/L) LE lead x 10 reps; NE tired    Shuttle: (B) LE leg press 6b x 20 reps; NE tired    Shuttle: (R/L) LE leg press 4-3b x 20 reps ea; NE tired    PERRI over rail x 10 reps; NE     Date: 1/4/2023  Tx#: 19/30 Date: 1/6/2023  Tx#: 20/30 Date: 1/11/2023  Tx#: 21/30 Date: 1/13/2023  Tx#: 22/30 Date: 1/18/2023  Tx#: 23/30 Date: 1/20/2023  Tx#: 24/30   NuStep LE only L2-3-4-2 x 10 mins; NE    Shuttle: (B) LE leg press 7b x 20+15 reps; NE    Shuttle: (L) LE leg press 4b x 20+10 reps; NE    PERRI over rail x 10 reps; NE    Step up onto a 6 inch step with greenTB abduction resist (L) LE lead x 10 reps; NE tired/fatigued    Seated: (R/L) bicep curl 7 lbs x 20 reps; NE    PERRI over rail x 10 reps; NE NuStep LE only L2-3-4-5-2 x 10 mins; NE    Shuttle: (B) LE leg press 7b 2 x 20 reps; NE    Shuttle: (L) LE leg press 4b x 20+10 reps; NE    PERRI over rail x 10 reps; NE    bluefoam balance face forward and diagonal (R/L) side 3 x 15-20 cts ea; NE tired (shaky)    PERRI over rail x 10 reps; NE     NuStep LE only L2-3-4-5-# x 10 mins; NE    Shuttle: (B) LE leg press 7b 2 x 20 reps; NE    Shuttle: (L) LE leg press 4b 2 x 20 reps; NE    PERRI over rail x 10 reps; NE    bluefoam balance face forward and diagonal (R/L) side 3 x 15-20 cts ea; NE tired (shaky)    Monster walk with greenTB abd resist fwd/bwkd with CGA/Denise 2 laps x 10 feet: NE tired  NuStep LE only L2-3-4-5-# x 10 mins; NE    Shuttle: (B) LE leg press 7b 2 x 20 reps; NE    Shuttle: (L) LE leg press 4b 2 x 20 reps; NE    PERRI over rail x 10 reps; NE    Supine: (L) LE SLR 2 sets x 10 reps x 10 cts ea; NE tired    Supine: (B) LE bridging 2 sets x 10 reps x 10 cts ea; NE tired    Monster walk with greenTB abd resist fwd/bwkd with CGA/Denise 2 laps x 10 feet: NE tired    Seated: (R/L) UE bicep curl 7 lbs x 20 reps; NE     PERRI over rail x 10 reps; NE NuStep LE only L2-3-4-5-6 x 10 mins; NE    Shuttle: (B) LE leg press 8b x 20 reps; NE    Shuttle: (R) LE leg press 5b x 20 reps; NE    Shuttle: (L) LE leg press 4b 2 x 20 reps; NE    Step up onto a 6 inch step with blueTB abduction resist (L) LE lead 2 x 10 reps; NE tired/fatigued    Bluefoam balance forward facing with (B) LE together 3 x 20 cts ea; NE tired    Lateral walk with greenTB abduction resist 1 lap x 10 feet with CGA; NE tired    Seated: (R/L) bicep curl 7 lbs x 20 reps ea; NE    PERRI over rail x 10 reps; NE   NuStep LE only L2-3-4-5-6 x 10 mins; NE    Shuttle: (B) LE leg [id number] 2 x 20 reps; NE    Shuttle: (R/L) LE leg press 5b 2 x 20 reps; NE    PERRI over rail x 10 reps; NE    Monster walk with greenTB abd resist fwd/bwkd with CGA/Denise 2 laps x 10 feet: NE tired         Assessment/HEP:   Sveta Maldonado did not report any pain in the (L) hip but felt tired and fatigued after her session. She was progressed with increased resistance and repetitions with cueing to keep good alignment specially with the (R) LE.       Goals:   (18 visits)  1. Patient to consistently perform their HEP and it's progression to maintain their improved condition. 2. Patient to have reduced and abolished symptoms to to be able to bend down, rise up from sitting, take the first few steps in the morning, walk, and climb up/donw stairs without producing or increasing symptoms in the (L) groin, lateral hip/buttock, and anterior thigh. 3. Patient to have WNL of (L) Hip AROM in all directions with 4/5 MMT in all motions to promote all home, work, recreational, and functional activities without discomfort or deviation. Plan:   Re-assess next session and continue with (L) LE ROM, strengthening, stretching, gait training, balance training, and HEP progression. Charges:  TherEx x 3       Total Timed Treatment: 48 mins Total Treatment Time: 49 mins

## 2023-01-25 ENCOUNTER — OFFICE VISIT (OUTPATIENT)
Dept: PHYSICAL THERAPY | Age: 83
End: 2023-01-25
Attending: ORTHOPAEDIC SURGERY
Payer: MEDICARE

## 2023-01-25 PROCEDURE — 97110 THERAPEUTIC EXERCISES: CPT | Performed by: PHYSICAL THERAPIST

## 2023-01-25 NOTE — PROGRESS NOTES
Date: 1/25/2023         Dx: Status post open reduction and internal fixation (ORIF) of fracture (Z98.890,Z87.81)  LBP              Authorized # of Visits:  [id number] + 12 (Rx: 12/8/2022) = 30        Referring MD:  Jocelyn Graff  Next MD visit: none scheduled    Medication Changes since last visit?: No    Subjective: Harini report that she is just feeling tired at this time but the (L) hip is doing well. She has been doing her HEP in supinelying regularly at home. Objective:  (-) Homans (L) LE;  (-) Tenderness (L) hip/thigh and (R) knee/hip;  (-) Warmth; 0/10 (L) lateral buttock sitting and standing; no pain/ache (L) hip walking with a rolling walker.      Date: 10/12/2022  Tx#: 2/18 Date: 10/14/2022  Tx#: 3/18 Date: 10/19/2022  Tx#: 4/18 Date: 10/21/2022  Tx#: 5/18 Date: 10/25/2022  Tx#: 6/18 Date: 10/27/2022  Tx#: 7/18   Ambulation to bed/table x 20 feet with rolling walker; ache (L) LE during stance phase    Supine: (L) LE hamstring and quadricep isometric 10 reps x 10 cts ea; NE    Supine: (L) LE SLR with belt assist 10 reps x 5-10 cts ea; NE    Supine: hooklying with greenTB 10 reps x 10 cts ea; NE    Supine: (B) LE bridge 10 reps x 10 cts ea; NE    Supine to sit; cueing needed to maintain good form and posture    Gait training with a rolling walker in department; no ache/pain (L) LE (better) Ambulation to bed/table then to NuStep 2 x 20 feet with rolling walker; ache (L) LE during stance phase    NuStep LE only L1 x 10 mins; Dec, B    Ambulation to bed/mat less ache/pain (L) hip/thigh    Supine: (L) LE SLR 10+5 reps x 5 cts ea; NE    Supine: (L) hamstring sets 2 x 10 reps x 10 cts ea; NE tired    Supine: (B) LE bridge with hip abduction greenTB resist 10 reps x 10 cts ea; NE    SLY: (L) LE clam redTB 10 reps x 10 cts ea; NE tired    Seated: (L) hamstring curl redTB 10 reps x 10 cts ea; NE tired/cramping    Gait training with a rolling walker in department; P, NW ache/pain (L) LE (better)    PERRI over rail x 10 reps; NE Ambulation with RW with SBA x 60 feet; P, NW (L) hip    PERRI over rail x 10 reps;  NE    No change (L) hip ache during ambulation    NuStep LE L1-2 x 10 mins; P, NW    Supine: (L) LE SLR 10+5 reps x 10 cts ea; NE (R) thigh muscles working    Supine hooklying: bridge 3 reps x 5 cts ea; P, NW (L) hip    Supine hooklying: (B) LE hip abduction green-blueTB 2 x 10 reps x 10 cts ea; NE    Supine to sit transfer Denise/CGA    Seated: (B) LE abduction blueTB resist 10 reps x 10 cts ea; NE    Ambulation with RW with SBA x 25 feet; same ache (L) hip    Shuttle: (B) leg press 3b 2 x 10 reps; NE    PERRI oer rail x 10 reps; NE    Ambulation with RW with SBA x 25 feet less pain (L) hip  Ambulation with RW with SBA x 60 feet; P, NW (L) hip    NuStep LE only L1-2-3 x 10 mins; NE    PERRI over rail x 10 reps; NE    Supine: (L) SLR 10 reps x 10 cts ea; NE     + 1 lb 2 x 5 reps x 5 cts ea; P, NW (sore)    SLY: Clam greenTB 10 reps x 10 cts ea; NE tired    Shuttle: (B) leg press 3b 2 x 10 reps; NE    Shuttle: (L) leg press  2b x 10 reps; NE    Ambulation with RW with SBA x 25 feet; P, NW             Ambulation with RW with SBA x 30 feet; P, NW (L) hip    PERRI over rail 2 x 10 reps; P, NW (better) mid back     - less pain (L) hip during walking (1/10)    PERRI over rail x 10 reps more; P, NW (better)     - no pain (L) hip during waking    Seated: (R/L) bicep curl 6 lbs x 10 reps ea; NE     Lateral walk with redTB abd resist and CGA/Denise x 5 feet; tired/nervous    PERRI over rail x 10 reps; NE       Ambulation with RW with SBA x 30 feet; P, NW (L) hip    PERRI over rail 2 x 10 reps; Dec, B      - less pain walking    PERRI over rail x 10 reps more; Dec, B     - lesser pain walking (does not feel like giving out anymore)    Sitting posture correction with lumbar roll x 3 mins; NE (feels good)    Pronelying x 5 mins; Dec, A    Prone to sit to stand; no pain/ache    PERRI over rail x 10 reps; NE    - no pain/ache (L) LE walking            Date: 11/2/2022  Tx#: 8/18 Date: 11/9/2022  Tx#: 9/18 Date: 11/11/2022  Tx#: 10/18 Date: 11/16/2022  Tx#: 11/18 Date: 11/18/2022  Tx#: 12/18 Date: 11/29/2022  Tx#: 13/18   Ambulation with RW with SBA x 60 feet; P, NW (L) hip    NuStep LE only L2-3-4 x 10 mins; NE    PERRI over rail x 10 reps; NE    Pronelying x 5 mins; NE    Prone: (L) knee flexion x 10+7 reps; NE    Prone: (L) hip extension 10 reps x 2 cts ea; NE    Step up onto a 6 inch step with (L) LE lead 2 x 10 reps; NE    Ambulation with RW with SBA/Mod I x 60 feet; NE Ambulation with RW with SBA x 60 feet; P, NW (L) hip    NuStep LE only L2-3-4 x 10 mins; NE    PERRI over rail x 10 reps; Dec, NB    Pronelying x 3 mins; Dec, A (L) hip pain    DARRIN x 2 mins; NE (feels good in the (L) hip)    Step up onto a 4 inch step (L) LE lead with (R) hand rail support x 10 reps; NE    Shuttle: (L) LE leg press 3b 2 x 20 reps; NE     Scifit UE only L1 fwd/bkwd x 5 mins ea; NE    PERRI over rail x 10 reps; NE    Supine: (L) SLR 1 lb 2 x 10 reps x 10 cts ea; P, NW    Supine: (B) LE bridge with greenTB abd resist 10 reps x 10 cts ea; NE    Step up onto a 6 inch step (L) LE lead x 10 reps; NE    PERRI over rail x 10 reps; NE Ambulation with RW with SBA x 60 feet; P, NW (L) hip    NuStep LE only L2-3 x 10 mins; NE    Supine: (L) SLR 3 lbs 2 x 10 reps x 10 cts ea; P, NW (less painful)    Supine: (B) LE bridge with blueTB abd resist 10 reps x 10 cts ea; NE    Step up onto a 6 inch step (L) LE lead 2 x 10 reps; NE    Ambulation with RW with SBA/Mod I x 60 feet; NE    PERRI over rail x 10 reps; Dec, NB    Shuttle: (L) leg press 3b x 20+         Ambulation with RW with SBA x 60 feet; P, NW (L) hip    NuStep LE only L2-3-4-6-2 x 10 mins; NE    PERRI over rail x 10 reps; NE      Supine: (L) SLR 3 lbs 2 x 10 reps x 10 cts ea; P, NW (less painful)    Supine: (B) LE bridge with blueTB abd resist 10 reps x 10 cts ea; NE    Supine hooklying:  Alt LE knee lift x 10 reps; P, NW    Step up onto a 6 inch step (L) LE lead 2 x 10 reps; NE    PERRI over rail x 10 reps; NE   Ambulation with RW with SBA x 60 feet; P, NW (L) hip    NuStep LE only L2-3-4-6-2 x 10 mins; NE    Shuttle: (B) LE leg press 5b 2 x 20 reps; NE    Shuttle: (L) LE leg press 4b x 20 reps; NE    Gait training with RW with SBA x 60+30 feet     Step up onto a 6 inch step (L) LE lead with 2 lbs 2 x 10 reps; NE    Hydrant: (L) LE 1 lb ball 6 x 5 cts ea; NE tired    PERRI over rail x 10 reps; NE                        Date:12/1/202  Tx#: 14/18 Date: 12/7/2022  Tx#: 15/18 Date: 12/9/2022  Tx#: 16/18 Date: 12/13/2022  Tx#: 17/18 Date: 12/20/2022  Tx#: 18/30   NuStep LE only L2-3-4-6-2 x 10 mins; NE    Shuttle: (B) LE leg press 5b 2 x 20 reps; NE    Shuttle: (L) LE leg press 3b 2 x 20 reps; NE    Gait training with RW with SBA x 60+30 feet     Step up onto a 6 inch step (L) LE lead with 2 lbs 2 x 10 reps; NE    Seated: (L/R) repeated knee extension 10 reps x 5-10 cts ea; NE tired    PERRI over rail x 10 reps; NE NuStep LE only L2-3-4-5-2 x 10 mins; NE    Shuttle: (B) LE leg press 6b 2 x 20 reps; NE    Shuttle: (L) LE leg press 4b 2 x 10 reps; NE    Gait training with RW with SBA x 30+30+30 feet     Step up onto a 6 inch step (L) LE lead with 4 lbs 2 x 10 reps; NE tired    Seated: (R/L) bicep curls    PERRI over rail x 10 reps; NE NuStep LE only L2-3-4-5-2 x 10 mins; NE    Shuttle: (B) LE leg press 6b 2 x 20 reps; NE    Shuttle: (L) LE leg press 4b 2 x 10 reps; NE    Seated: (L/R) LAQ 10 reps x 10 cts ea; NE    Gait training with RW with SBA x 30+30 feet    Step up onto a 6 inch step (L) LE lead with 4 lbs x 10 reps; NE tired    Seated: (R/L) bicep curls 6 lbs x 20 reps ea; NE tired    PERRI over rail x 10 reps; NE NuStep LE only L2-3-4-5-2 x 10 mins; NE    Shuttle: (B) LE leg press 7b 2 x 10 reps; NE    Shuttle: (L) LE leg press 4b x 15+20 reps; NE    Seated: (L/R) LAQ 2 bs 10 reps x 10 cts ea; NE    Gait training with RW with SBA x 30+30 feet    Step up onto a 6 inch step (L) LE lead with 4 lbs x 10+5 reps; NE tired    Seated: (R/L) bicep curls, arm pulls 6 lbs x 20 reps ea; NE tired    PERRI over rail x 10 reps; NE NuStep LE only L1-2-3-2 x 10 mins; NE tired    Gait training with RW with SBA x 30+30 feet    Supine: (L/R) SLR 3-2 lbs 10 reps x 10 cts ea; P, NW (L)/ (R) NE tired    Supine hooklying: bridge with hip abduction with greenTB resist 10 reps x 10 cts ea; NE tired    Step up onto a 4 inch step with redTB abduction resist (R/L) LE lead x 10 reps; NE tired    Shuttle: (B) LE leg press 6b x 20 reps; NE tired    Shuttle: (R/L) LE leg press 4-3b x 20 reps ea; NE tired    PERRI over rail x 10 reps; NE     Date: 1/4/2023  Tx#: 19/30 Date: 1/6/2023  Tx#: 20/30 Date: 1/11/2023  Tx#: 21/30 Date: 1/13/2023  Tx#: 22/30 Date: 1/18/2023  Tx#: 23/30 Date: 1/20/2023  Tx#: 24/30   NuStep LE only L2-3-4-2 x 10 mins; NE    Shuttle: (B) LE leg press 7b x 20+15 reps; NE    Shuttle: (L) LE leg press 4b x 20+10 reps; NE    PERRI over rail x 10 reps; NE    Step up onto a 6 inch step with greenTB abduction resist (L) LE lead x 10 reps; NE tired/fatigued    Seated: (R/L) bicep curl 7 lbs x 20 reps; NE    PERRI over rail x 10 reps; NE NuStep LE only L2-3-4-5-2 x 10 mins; NE    Shuttle: (B) LE leg press 7b 2 x 20 reps; NE    Shuttle: (L) LE leg press 4b x 20+10 reps; NE    PERRI over rail x 10 reps; NE    bluefoam balance face forward and diagonal (R/L) side 3 x 15-20 cts ea; NE tired (shaky)    PERRI over rail x 10 reps; NE     NuStep LE only L2-3-4-5-# x 10 mins; NE    Shuttle: (B) LE leg press 7b 2 x 20 reps; NE    Shuttle: (L) LE leg press 4b 2 x 20 reps; NE    PERRI over rail x 10 reps; NE    bluefoam balance face forward and diagonal (R/L) side 3 x 15-20 cts ea; NE tired (shaky)    Monster walk with greenTB abd resist fwd/bwkd with CGA/Denise 2 laps x 10 feet: NE tired  NuStep LE only L2-3-4-5-# x 10 mins; NE    Shuttle: (B) LE leg press 7b 2 x 20 reps; NE    Shuttle: (L) LE leg press 4b 2 x 20 reps; NE    PERRI over rail x 10 reps; NE    Supine: (L) LE SLR 2 sets x 10 reps x 10 cts ea; NE tired    Supine: (B) LE bridging 2 sets x 10 reps x 10 cts ea; NE tired    Monster walk with greenTB abd resist fwd/bwkd with CGA/Denise 2 laps x 10 feet: NE tired    Seated: (R/L) UE bicep curl 7 lbs x 20 reps; NE     PERRI over rail x 10 reps; NE NuStep LE only L2-3-4-5-6 x 10 mins; NE    Shuttle: (B) LE leg press 8b x 20 reps; NE    Shuttle: (R) LE leg press 5b x 20 reps; NE    Shuttle: (L) LE leg press 4b 2 x 20 reps; NE    Step up onto a 6 inch step with blueTB abduction resist (L) LE lead 2 x 10 reps; NE tired/fatigued    Bluefoam balance forward facing with (B) LE together 3 x 20 cts ea; NE tired    Lateral walk with greenTB abduction resist 1 lap x 10 feet with CGA; NE tired    Seated: (R/L) bicep curl 7 lbs x 20 reps ea; NE    PERRI over rail x 10 reps; NE   NuStep LE only L2-3-4-5-6 x 10 mins; NE    Shuttle: (B) LE leg [id number] 2 x 20 reps; NE    Shuttle: (R/L) LE leg press 5b 2 x 20 reps; NE    PERRI over rail x 10 reps; NE    Monster walk with greenTB abd resist fwd/bwkd with CGA/Denise 2 laps x 10 feet: NE tired         Date: 1/25/2023  Tx#: 25/30        NuStep LE only L2-3-5-2 x 10 mins; NE    Shuttle: (B) LE leg press7-8b 2 x 20 reps; NE    Shuttle: (R/L) LE leg press 5b 2 x 20 reps; NE    PERRI over rail x 10 reps; NE    Step up onto a 6 inch step with greenTB abduction resist 1 rail support (L) LE lead x 10 reps + 8 inch step with CGA x 5 reps; NE tired/fatigued    Lateral walk with greenTB abduction resist 1 lap x 10 feet with CGA; NE tired    Seated: (R/L) UE bicep curl 7 lbs x 20 reps; NE          Assessment/HEP:   Peder Light did not report any pain in the (L) hip during and after her session. She has WFL of (L) hip AROM in all directions with 3+/5 to 4-/5 MMT toward (L) hip extension and abduction and 4-/5 toward flexion motions. She has improved standing posture but still stooped forward.  She has a tendency to do lean back when standing and to keep (B) feet together. All questions and concerns were answered and addresed at this time. Goals:   (18 visits)  1. Patient to consistently perform their HEP and it's progression to maintain their improved condition. 2. Patient to have reduced and abolished symptoms to to be able to bend down, rise up from sitting, take the first few steps in the morning, walk, and climb up/donw stairs without producing or increasing symptoms in the (L) groin, lateral hip/buttock, and anterior thigh. 3. Patient to have WNL of (L) Hip AROM in all directions with 4/5 MMT in all motions to promote all home, work, recreational, and functional activities without discomfort or deviation. Plan:   Re-assess next session and continue with (L) LE ROM, strengthening, stretching, gait training, balance training, and HEP progression. Charges:  TherEx x 3       Total Timed Treatment: 49 mins Total Treatment Time: 50 mins

## 2023-01-27 ENCOUNTER — APPOINTMENT (OUTPATIENT)
Dept: PHYSICAL THERAPY | Age: 83
End: 2023-01-27
Attending: ORTHOPAEDIC SURGERY
Payer: MEDICARE

## 2023-02-01 ENCOUNTER — OFFICE VISIT (OUTPATIENT)
Dept: PHYSICAL THERAPY | Age: 83
End: 2023-02-01
Attending: ORTHOPAEDIC SURGERY
Payer: MEDICARE

## 2023-02-01 ENCOUNTER — APPOINTMENT (OUTPATIENT)
Dept: PHYSICAL THERAPY | Age: 83
End: 2023-02-01
Attending: ORTHOPAEDIC SURGERY
Payer: MEDICARE

## 2023-02-01 PROCEDURE — 97110 THERAPEUTIC EXERCISES: CPT | Performed by: PHYSICAL THERAPIST

## 2023-02-01 NOTE — PROGRESS NOTES
Date: 2/1/2023         Dx: Status post open reduction and internal fixation (ORIF) of fracture (Z98.890,Z87.81)  LBP              Authorized # of Visits:  [id number] + 12 (Rx: 12/8/2022) = 30        Referring MD:  Erika Lu  Next MD visit: none scheduled    Medication Changes since last visit?: No    Subjective: Junious Shines report that she is feeling under the weather since the weekend but her (L) hip is doing well. She is ambulating with a rolling walker in and outdoors. She said that she continue to do her HEP. Objective:  (-) Homans (L) LE;  (-) Tenderness (L) hip/thigh and (R) knee/hip;  (-) Warmth; 0/10 (L) lateral buttock sitting and standing; no pain/ache (L) hip walking with a rolling walker.      Date: 10/12/2022  Tx#: 2/18 Date: 10/14/2022  Tx#: 3/18 Date: 10/19/2022  Tx#: 4/18 Date: 10/21/2022  Tx#: 5/18 Date: 10/25/2022  Tx#: 6/18 Date: 10/27/2022  Tx#: 7/18   Ambulation to bed/table x 20 feet with rolling walker; ache (L) LE during stance phase    Supine: (L) LE hamstring and quadricep isometric 10 reps x 10 cts ea; NE    Supine: (L) LE SLR with belt assist 10 reps x 5-10 cts ea; NE    Supine: hooklying with greenTB 10 reps x 10 cts ea; NE    Supine: (B) LE bridge 10 reps x 10 cts ea; NE    Supine to sit; cueing needed to maintain good form and posture    Gait training with a rolling walker in department; no ache/pain (L) LE (better) Ambulation to bed/table then to NuStep 2 x 20 feet with rolling walker; ache (L) LE during stance phase    NuStep LE only L1 x 10 mins; Dec, B    Ambulation to bed/mat less ache/pain (L) hip/thigh    Supine: (L) LE SLR 10+5 reps x 5 cts ea; NE    Supine: (L) hamstring sets 2 x 10 reps x 10 cts ea; NE tired    Supine: (B) LE bridge with hip abduction greenTB resist 10 reps x 10 cts ea; NE    SLY: (L) LE clam redTB 10 reps x 10 cts ea; NE tired    Seated: (L) hamstring curl redTB 10 reps x 10 cts ea; NE tired/cramping    Gait training with a rolling walker in department; P, NW ache/pain (L) LE (better)    PERRI over rail x 10 reps; NE Ambulation with RW with SBA x 60 feet; P, NW (L) hip    PERRI over rail x 10 reps;  NE    No change (L) hip ache during ambulation    NuStep LE L1-2 x 10 mins; P, NW    Supine: (L) LE SLR 10+5 reps x 10 cts ea; NE (R) thigh muscles working    Supine hooklying: bridge 3 reps x 5 cts ea; P, NW (L) hip    Supine hooklying: (B) LE hip abduction green-blueTB 2 x 10 reps x 10 cts ea; NE    Supine to sit transfer Denise/CGA    Seated: (B) LE abduction blueTB resist 10 reps x 10 cts ea; NE    Ambulation with RW with SBA x 25 feet; same ache (L) hip    Shuttle: (B) leg press 3b 2 x 10 reps; NE    PERRI oer rail x 10 reps; NE    Ambulation with RW with SBA x 25 feet less pain (L) hip  Ambulation with RW with SBA x 60 feet; P, NW (L) hip    NuStep LE only L1-2-3 x 10 mins; NE    PERRI over rail x 10 reps; NE    Supine: (L) SLR 10 reps x 10 cts ea; NE     + 1 lb 2 x 5 reps x 5 cts ea; P, NW (sore)    SLY: Clam greenTB 10 reps x 10 cts ea; NE tired    Shuttle: (B) leg press 3b 2 x 10 reps; NE    Shuttle: (L) leg press  2b x 10 reps; NE    Ambulation with RW with SBA x 25 feet; P, NW             Ambulation with RW with SBA x 30 feet; P, NW (L) hip    PERRI over rail 2 x 10 reps; P, NW (better) mid back     - less pain (L) hip during walking (1/10)    PERRI over rail x 10 reps more; P, NW (better)     - no pain (L) hip during waking    Seated: (R/L) bicep curl 6 lbs x 10 reps ea; NE     Lateral walk with redTB abd resist and CGA/Denise x 5 feet; tired/nervous    PERRI over rail x 10 reps; NE       Ambulation with RW with SBA x 30 feet; P, NW (L) hip    PERRI over rail 2 x 10 reps; Dec, B      - less pain walking    PERRI over rail x 10 reps more; Dec, B     - lesser pain walking (does not feel like giving out anymore)    Sitting posture correction with lumbar roll x 3 mins; NE (feels good)    Pronelying x 5 mins; Dec, A    Prone to sit to stand; no pain/ache    PERRI over rail x 10 reps; NE    - no pain/ache (L) LE walking            Date: 11/2/2022  Tx#: 8/18 Date: 11/9/2022  Tx#: 9/18 Date: 11/11/2022  Tx#: 10/18 Date: 11/16/2022  Tx#: 11/18 Date: 11/18/2022  Tx#: 12/18 Date: 11/29/2022  Tx#: 13/18   Ambulation with RW with SBA x 60 feet; P, NW (L) hip    NuStep LE only L2-3-4 x 10 mins; NE    PERRI over rail x 10 reps; NE    Pronelying x 5 mins; NE    Prone: (L) knee flexion x 10+7 reps; NE    Prone: (L) hip extension 10 reps x 2 cts ea; NE    Step up onto a 6 inch step with (L) LE lead 2 x 10 reps; NE    Ambulation with RW with SBA/Mod I x 60 feet; NE Ambulation with RW with SBA x 60 feet; P, NW (L) hip    NuStep LE only L2-3-4 x 10 mins; NE    PERRI over rail x 10 reps; Dec, NB    Pronelying x 3 mins; Dec, A (L) hip pain    DARRIN x 2 mins; NE (feels good in the (L) hip)    Step up onto a 4 inch step (L) LE lead with (R) hand rail support x 10 reps; NE    Shuttle: (L) LE leg press 3b 2 x 20 reps; NE     Scifit UE only L1 fwd/bkwd x 5 mins ea; NE    PERRI over rail x 10 reps; NE    Supine: (L) SLR 1 lb 2 x 10 reps x 10 cts ea; P, NW    Supine: (B) LE bridge with greenTB abd resist 10 reps x 10 cts ea; NE    Step up onto a 6 inch step (L) LE lead x 10 reps; NE    PERRI over rail x 10 reps; NE Ambulation with RW with SBA x 60 feet; P, NW (L) hip    NuStep LE only L2-3 x 10 mins; NE    Supine: (L) SLR 3 lbs 2 x 10 reps x 10 cts ea; P, NW (less painful)    Supine: (B) LE bridge with blueTB abd resist 10 reps x 10 cts ea; NE    Step up onto a 6 inch step (L) LE lead 2 x 10 reps; NE    Ambulation with RW with SBA/Mod I x 60 feet; NE    PERRI over rail x 10 reps; Dec, NB    Shuttle: (L) leg press 3b x 20+         Ambulation with RW with SBA x 60 feet; P, NW (L) hip    NuStep LE only L2-3-4-6-2 x 10 mins; NE    PERRI over rail x 10 reps; NE      Supine: (L) SLR 3 lbs 2 x 10 reps x 10 cts ea; P, NW (less painful)    Supine: (B) LE bridge with blueTB abd resist 10 reps x 10 cts ea; NE    Supine hooklying: Alt LE knee lift x 10 reps; P, NW    Step up onto a 6 inch step (L) LE lead 2 x 10 reps; NE    PERRI over rail x 10 reps; NE   Ambulation with RW with SBA x 60 feet; P, NW (L) hip    NuStep LE only L2-3-4-6-2 x 10 mins; NE    Shuttle: (B) LE leg press 5b 2 x 20 reps; NE    Shuttle: (L) LE leg press 4b x 20 reps; NE    Gait training with RW with SBA x 60+30 feet     Step up onto a 6 inch step (L) LE lead with 2 lbs 2 x 10 reps; NE    Hydrant: (L) LE 1 lb ball 6 x 5 cts ea; NE tired    PERRI over rail x 10 reps; NE                        Date:12/1/202  Tx#: 14/18 Date: 12/7/2022  Tx#: 15/18 Date: 12/9/2022  Tx#: 16/18 Date: 12/13/2022  Tx#: 17/18 Date: 12/20/2022  Tx#: 18/30   NuStep LE only L2-3-4-6-2 x 10 mins; NE    Shuttle: (B) LE leg press 5b 2 x 20 reps; NE    Shuttle: (L) LE leg press 3b 2 x 20 reps; NE    Gait training with RW with SBA x 60+30 feet     Step up onto a 6 inch step (L) LE lead with 2 lbs 2 x 10 reps; NE    Seated: (L/R) repeated knee extension 10 reps x 5-10 cts ea; NE tired    PERRI over rail x 10 reps; NE NuStep LE only L2-3-4-5-2 x 10 mins; NE    Shuttle: (B) LE leg press 6b 2 x 20 reps; NE    Shuttle: (L) LE leg press 4b 2 x 10 reps; NE    Gait training with RW with SBA x 30+30+30 feet     Step up onto a 6 inch step (L) LE lead with 4 lbs 2 x 10 reps; NE tired    Seated: (R/L) bicep curls    PERRI over rail x 10 reps; NE NuStep LE only L2-3-4-5-2 x 10 mins; NE    Shuttle: (B) LE leg press 6b 2 x 20 reps; NE    Shuttle: (L) LE leg press 4b 2 x 10 reps; NE    Seated: (L/R) LAQ 10 reps x 10 cts ea; NE    Gait training with RW with SBA x 30+30 feet    Step up onto a 6 inch step (L) LE lead with 4 lbs x 10 reps; NE tired    Seated: (R/L) bicep curls 6 lbs x 20 reps ea; NE tired    PERRI over rail x 10 reps; NE NuStep LE only L2-3-4-5-2 x 10 mins; NE    Shuttle: (B) LE leg press 7b 2 x 10 reps; NE    Shuttle: (L) LE leg press 4b x 15+20 reps; NE    Seated: (L/R) LAQ 2 bs 10 reps x 10 cts ea; NE    Gait training with RW with SBA x 30+30 feet    Step up onto a 6 inch step (L) LE lead with 4 lbs x 10+5 reps; NE tired    Seated: (R/L) bicep curls, arm pulls 6 lbs x 20 reps ea; NE tired    PERRI over rail x 10 reps; NE NuStep LE only L1-2-3-2 x 10 mins; NE tired    Gait training with RW with SBA x 30+30 feet    Supine: (L/R) SLR 3-2 lbs 10 reps x 10 cts ea; P, NW (L)/ (R) NE tired    Supine hooklying: bridge with hip abduction with greenTB resist 10 reps x 10 cts ea; NE tired    Step up onto a 4 inch step with redTB abduction resist (R/L) LE lead x 10 reps; NE tired    Shuttle: (B) LE leg press 6b x 20 reps; NE tired    Shuttle: (R/L) LE leg press 4-3b x 20 reps ea; NE tired    PERRI over rail x 10 reps; NE     Date: 1/4/2023  Tx#: 19/30 Date: 1/6/2023  Tx#: 20/30 Date: 1/11/2023  Tx#: 21/30 Date: 1/13/2023  Tx#: 22/30 Date: 1/18/2023  Tx#: 23/30 Date: 1/20/2023  Tx#: 24/30   NuStep LE only L2-3-4-2 x 10 mins; NE    Shuttle: (B) LE leg press 7b x 20+15 reps; NE    Shuttle: (L) LE leg press 4b x 20+10 reps; NE    PERRI over rail x 10 reps; NE    Step up onto a 6 inch step with greenTB abduction resist (L) LE lead x 10 reps; NE tired/fatigued    Seated: (R/L) bicep curl 7 lbs x 20 reps; NE    PERRI over rail x 10 reps; NE NuStep LE only L2-3-4-5-2 x 10 mins; NE    Shuttle: (B) LE leg press 7b 2 x 20 reps; NE    Shuttle: (L) LE leg press 4b x 20+10 reps; NE    PERRI over rail x 10 reps; NE    bluefoam balance face forward and diagonal (R/L) side 3 x 15-20 cts ea; NE tired (shaky)    PERRI over rail x 10 reps; NE     NuStep LE only L2-3-4-5-# x 10 mins; NE    Shuttle: (B) LE leg press 7b 2 x 20 reps; NE    Shuttle: (L) LE leg press 4b 2 x 20 reps; NE    PERRI over rail x 10 reps; NE    bluefoam balance face forward and diagonal (R/L) side 3 x 15-20 cts ea; NE tired (shaky)    Monster walk with greenTB abd resist fwd/bwkd with CGA/Denise 2 laps x 10 feet: NE tired  NuStep LE only L2-3-4-5-# x 10 mins; NE    Shuttle: (B) LE leg press 7b 2 x 20 reps; NE    Shuttle: (L) LE leg press 4b 2 x 20 reps; NE    PERRI over rail x 10 reps; NE    Supine: (L) LE SLR 2 sets x 10 reps x 10 cts ea; NE tired    Supine: (B) LE bridging 2 sets x 10 reps x 10 cts ea; NE tired    Monster walk with greenTB abd resist fwd/bwkd with CGA/Denise 2 laps x 10 feet: NE tired    Seated: (R/L) UE bicep curl 7 lbs x 20 reps; NE     PERRI over rail x 10 reps; NE NuStep LE only L2-3-4-5-6 x 10 mins; NE    Shuttle: (B) LE leg press 8b x 20 reps; NE    Shuttle: (R) LE leg press 5b x 20 reps; NE    Shuttle: (L) LE leg press 4b 2 x 20 reps; NE    Step up onto a 6 inch step with blueTB abduction resist (L) LE lead 2 x 10 reps; NE tired/fatigued    Bluefoam balance forward facing with (B) LE together 3 x 20 cts ea; NE tired    Lateral walk with greenTB abduction resist 1 lap x 10 feet with CGA; NE tired    Seated: (R/L) bicep curl 7 lbs x 20 reps ea; NE    PERRI over rail x 10 reps; NE   NuStep LE only L2-3-4-5-6 x 10 mins; NE    Shuttle: (B) LE leg lpnbr3l 2 x 20 reps; NE    Shuttle: (R/L) LE leg press 5b 2 x 20 reps; NE    PERRI over rail x 10 reps; NE    Monster walk with greenTB abd resist fwd/bwkd with CGA/Denise 2 laps x 10 feet: NE tired         Date: 1/25/2023  Tx#: 25/30 Date: 2/1/2023  Tx#: 26/30       NuStep LE only L2-3-5-2 x 10 mins; NE    Shuttle: (B) LE leg press7-8b 2 x 20 reps; NE    Shuttle: (R/L) LE leg press 5b 2 x 20 reps; NE    PERRI over rail x 10 reps; NE    Step up onto a 6 inch step with greenTB abduction resist 1 rail support (L) LE lead x 10 reps + 8 inch step with CGA x 5 reps; NE tired/fatigued    Lateral walk with greenTB abduction resist 1 lap x 10 feet with CGA; NE tired    Seated: (R/L) UE bicep curl 7 lbs x 20 reps; NE NuStep LE only L2-3-5-2 x 10 mins; NE    Shuttle: (B) LE leg press 8b  x 20 reps; NE    Shuttle: (R/L) LE leg press 5b x 20 reps; NE    PERRI over rail x 10 reps; NE    Supine: (L) LE SLR 2 sets x 10 reps x 10 cts ea; NE tired    Supine: (B) LE bridging x 10 reps x 10 cts ea; NE tired               Assessment/HEP:   Rayray Rowley was tired and fatigued more and was not as strong as she were the other session. She is under the weather but was still willing to do her exercises. She required minimal cueing to correct (L) knee to toe alignment. No pain reported during and after her session. Goals:   (18 visits)  1. Patient to consistently perform their HEP and it's progression to maintain their improved condition. 2. Patient to have reduced and abolished symptoms to to be able to bend down, rise up from sitting, take the first few steps in the morning, walk, and climb up/donw stairs without producing or increasing symptoms in the (L) groin, lateral hip/buttock, and anterior thigh. 3. Patient to have WNL of (L) Hip AROM in all directions with 4/5 MMT in all motions to promote all home, work, recreational, and functional activities without discomfort or deviation. Plan:   Re-assess next session and continue with (L) LE ROM, strengthening, stretching, gait training, balance training, and HEP progression. Charges:  TherEx x 3       Total Timed Treatment: 46 mins Total Treatment Time: 47 mins

## 2023-02-02 ENCOUNTER — APPOINTMENT (OUTPATIENT)
Dept: PHYSICAL THERAPY | Age: 83
End: 2023-02-02
Attending: ORTHOPAEDIC SURGERY
Payer: MEDICARE

## 2023-02-03 ENCOUNTER — OFFICE VISIT (OUTPATIENT)
Dept: PHYSICAL THERAPY | Age: 83
End: 2023-02-03
Attending: ORTHOPAEDIC SURGERY
Payer: MEDICARE

## 2023-02-03 ENCOUNTER — APPOINTMENT (OUTPATIENT)
Dept: PHYSICAL THERAPY | Age: 83
End: 2023-02-03
Attending: ORTHOPAEDIC SURGERY
Payer: MEDICARE

## 2023-02-03 PROCEDURE — 97110 THERAPEUTIC EXERCISES: CPT | Performed by: PHYSICAL THERAPIST

## 2023-02-03 NOTE — PROGRESS NOTES
Date: 2/3/2023         Dx: Status post open reduction and internal fixation (ORIF) of fracture (Z98.890,Z87.81)  LBP              Authorized # of Visits:  18 + 12 (Rx: 12/8/2022) = 30        Referring MD:  Kwaku Vera  Next MD visit: none scheduled    Medication Changes since last visit?: No    Subjective: Misa Meter report that she does not have any real pain in the (L) hip. It is just achy most of the time. She is feeling much better than last time bu she still have a little cold. Objective:  (-) Homans (L) LE;  (-) Tenderness (L) hip/thigh and (R) knee/hip;  (-) Warmth; 0/10 (L) lateral buttock sitting and standing; no pain/ache (L) hip walking with a rolling walker.      Date: 10/12/2022  Tx#: 2/18 Date: 10/14/2022  Tx#: 3/18 Date: 10/19/2022  Tx#: 4/18 Date: 10/21/2022  Tx#: 5/18 Date: 10/25/2022  Tx#: 6/18 Date: 10/27/2022  Tx#: 7/18   Ambulation to bed/table x 20 feet with rolling walker; ache (L) LE during stance phase    Supine: (L) LE hamstring and quadricep isometric 10 reps x 10 cts ea; NE    Supine: (L) LE SLR with belt assist 10 reps x 5-10 cts ea; NE    Supine: hooklying with greenTB 10 reps x 10 cts ea; NE    Supine: (B) LE bridge 10 reps x 10 cts ea; NE    Supine to sit; cueing needed to maintain good form and posture    Gait training with a rolling walker in department; no ache/pain (L) LE (better) Ambulation to bed/table then to NuStep 2 x 20 feet with rolling walker; ache (L) LE during stance phase    NuStep LE only L1 x 10 mins; Dec, B    Ambulation to bed/mat less ache/pain (L) hip/thigh    Supine: (L) LE SLR 10+5 reps x 5 cts ea; NE    Supine: (L) hamstring sets 2 x 10 reps x 10 cts ea; NE tired    Supine: (B) LE bridge with hip abduction greenTB resist 10 reps x 10 cts ea; NE    SLY: (L) LE clam redTB 10 reps x 10 cts ea; NE tired    Seated: (L) hamstring curl redTB 10 reps x 10 cts ea; NE tired/cramping    Gait training with a rolling walker in department; P, NW ache/pain (L) LE (better)    PERRI over rail x 10 reps; NE Ambulation with RW with SBA x 60 feet; P, NW (L) hip    PERRI over rail x 10 reps;  NE    No change (L) hip ache during ambulation    NuStep LE L1-2 x 10 mins; P, NW    Supine: (L) LE SLR 10+5 reps x 10 cts ea; NE (R) thigh muscles working    Supine hooklying: bridge 3 reps x 5 cts ea; P, NW (L) hip    Supine hooklying: (B) LE hip abduction green-blueTB 2 x 10 reps x 10 cts ea; NE    Supine to sit transfer Denise/CGA    Seated: (B) LE abduction blueTB resist 10 reps x 10 cts ea; NE    Ambulation with RW with SBA x 25 feet; same ache (L) hip    Shuttle: (B) leg press 3b 2 x 10 reps; NE    PERRI oer rail x 10 reps; NE    Ambulation with RW with SBA x 25 feet less pain (L) hip  Ambulation with RW with SBA x 60 feet; P, NW (L) hip    NuStep LE only L1-2-3 x 10 mins; NE    PERRI over rail x 10 reps; NE    Supine: (L) SLR 10 reps x 10 cts ea; NE     + 1 lb 2 x 5 reps x 5 cts ea; P, NW (sore)    SLY: Clam greenTB 10 reps x 10 cts ea; NE tired    Shuttle: (B) leg press 3b 2 x 10 reps; NE    Shuttle: (L) leg press  2b x 10 reps; NE    Ambulation with RW with SBA x 25 feet; P, NW             Ambulation with RW with SBA x 30 feet; P, NW (L) hip    PERRI over rail 2 x 10 reps; P, NW (better) mid back     - less pain (L) hip during walking (1/10)    PERRI over rail x 10 reps more; P, NW (better)     - no pain (L) hip during waking    Seated: (R/L) bicep curl 6 lbs x 10 reps ea; NE     Lateral walk with redTB abd resist and CGA/Denise x 5 feet; tired/nervous    PERRI over rail x 10 reps; NE       Ambulation with RW with SBA x 30 feet; P, NW (L) hip    PERRI over rail 2 x 10 reps; Dec, B      - less pain walking    PERRI over rail x 10 reps more; Dec, B     - lesser pain walking (does not feel like giving out anymore)    Sitting posture correction with lumbar roll x 3 mins; NE (feels good)    Pronelying x 5 mins; Dec, A    Prone to sit to stand; no pain/ache    PERRI over rail x 10 reps; NE    - no pain/ache (L) LE walking            Date: 11/2/2022  Tx#: 8/18 Date: 11/9/2022  Tx#: 9/18 Date: 11/11/2022  Tx#: 10/18 Date: 11/16/2022  Tx#: 11/18 Date: 11/18/2022  Tx#: 12/18 Date: 11/29/2022  Tx#: 13/18   Ambulation with RW with SBA x 60 feet; P, NW (L) hip    NuStep LE only L2-3-4 x 10 mins; NE    PERRI over rail x 10 reps; NE    Pronelying x 5 mins; NE    Prone: (L) knee flexion x 10+7 reps; NE    Prone: (L) hip extension 10 reps x 2 cts ea; NE    Step up onto a 6 inch step with (L) LE lead 2 x 10 reps; NE    Ambulation with RW with SBA/Mod I x 60 feet; NE Ambulation with RW with SBA x 60 feet; P, NW (L) hip    NuStep LE only L2-3-4 x 10 mins; NE    PERRI over rail x 10 reps; Dec, NB    Pronelying x 3 mins; Dec, A (L) hip pain    DARRIN x 2 mins; NE (feels good in the (L) hip)    Step up onto a 4 inch step (L) LE lead with (R) hand rail support x 10 reps; NE    Shuttle: (L) LE leg press 3b 2 x 20 reps; NE     Scifit UE only L1 fwd/bkwd x 5 mins ea; NE    PERRI over rail x 10 reps; NE    Supine: (L) SLR 1 lb 2 x 10 reps x 10 cts ea; P, NW    Supine: (B) LE bridge with greenTB abd resist 10 reps x 10 cts ea; NE    Step up onto a 6 inch step (L) LE lead x 10 reps; NE    PERRI over rail x 10 reps; NE Ambulation with RW with SBA x 60 feet; P, NW (L) hip    NuStep LE only L2-3 x 10 mins; NE    Supine: (L) SLR 3 lbs 2 x 10 reps x 10 cts ea; P, NW (less painful)    Supine: (B) LE bridge with blueTB abd resist 10 reps x 10 cts ea; NE    Step up onto a 6 inch step (L) LE lead 2 x 10 reps; NE    Ambulation with RW with SBA/Mod I x 60 feet; NE    PERRI over rail x 10 reps; Dec, NB    Shuttle: (L) leg press 3b x 20+         Ambulation with RW with SBA x 60 feet; P, NW (L) hip    NuStep LE only L2-3-4-6-2 x 10 mins; NE    PERRI over rail x 10 reps; NE      Supine: (L) SLR 3 lbs 2 x 10 reps x 10 cts ea; P, NW (less painful)    Supine: (B) LE bridge with blueTB abd resist 10 reps x 10 cts ea; NE    Supine hooklying:  Alt LE knee lift x 10 reps; P, NW    Step up onto a 6 inch step (L) LE lead 2 x 10 reps; NE    PERRI over rail x 10 reps; NE   Ambulation with RW with SBA x 60 feet; P, NW (L) hip    NuStep LE only L2-3-4-6-2 x 10 mins; NE    Shuttle: (B) LE leg press 5b 2 x 20 reps; NE    Shuttle: (L) LE leg press 4b x 20 reps; NE    Gait training with RW with SBA x 60+30 feet     Step up onto a 6 inch step (L) LE lead with 2 lbs 2 x 10 reps; NE    Hydrant: (L) LE 1 lb ball 6 x 5 cts ea; NE tired    PERRI over rail x 10 reps; NE                        Date:12/1/202  Tx#: 14/18 Date: 12/7/2022  Tx#: 15/18 Date: 12/9/2022  Tx#: 16/18 Date: 12/13/2022  Tx#: 17/18 Date: 12/20/2022  Tx#: 18/30   NuStep LE only L2-3-4-6-2 x 10 mins; NE    Shuttle: (B) LE leg press 5b 2 x 20 reps; NE    Shuttle: (L) LE leg press 3b 2 x 20 reps; NE    Gait training with RW with SBA x 60+30 feet     Step up onto a 6 inch step (L) LE lead with 2 lbs 2 x 10 reps; NE    Seated: (L/R) repeated knee extension 10 reps x 5-10 cts ea; NE tired    PERRI over rail x 10 reps; NE NuStep LE only L2-3-4-5-2 x 10 mins; NE    Shuttle: (B) LE leg press 6b 2 x 20 reps; NE    Shuttle: (L) LE leg press 4b 2 x 10 reps; NE    Gait training with RW with SBA x 30+30+30 feet     Step up onto a 6 inch step (L) LE lead with 4 lbs 2 x 10 reps; NE tired    Seated: (R/L) bicep curls    PERRI over rail x 10 reps; NE NuStep LE only L2-3-4-5-2 x 10 mins; NE    Shuttle: (B) LE leg press 6b 2 x 20 reps; NE    Shuttle: (L) LE leg press 4b 2 x 10 reps; NE    Seated: (L/R) LAQ 10 reps x 10 cts ea; NE    Gait training with RW with SBA x 30+30 feet    Step up onto a 6 inch step (L) LE lead with 4 lbs x 10 reps; NE tired    Seated: (R/L) bicep curls 6 lbs x 20 reps ea; NE tired    PERRI over rail x 10 reps; NE NuStep LE only L2-3-4-5-2 x 10 mins; NE    Shuttle: (B) LE leg press 7b 2 x 10 reps; NE    Shuttle: (L) LE leg press 4b x 15+20 reps; NE    Seated: (L/R) LAQ 2 bs 10 reps x 10 cts ea; NE    Gait training with RW with SBA x 30+30 feet    Step up onto a 6 inch step (L) LE lead with 4 lbs x 10+5 reps; NE tired    Seated: (R/L) bicep curls, arm pulls 6 lbs x 20 reps ea; NE tired    PERRI over rail x 10 reps; NE NuStep LE only L1-2-3-2 x 10 mins; NE tired    Gait training with RW with SBA x 30+30 feet    Supine: (L/R) SLR 3-2 lbs 10 reps x 10 cts ea; P, NW (L)/ (R) NE tired    Supine hooklying: bridge with hip abduction with greenTB resist 10 reps x 10 cts ea; NE tired    Step up onto a 4 inch step with redTB abduction resist (R/L) LE lead x 10 reps; NE tired    Shuttle: (B) LE leg press 6b x 20 reps; NE tired    Shuttle: (R/L) LE leg press 4-3b x 20 reps ea; NE tired    PERRI over rail x 10 reps; NE     Date: 1/4/2023  Tx#: 19/30 Date: 1/6/2023  Tx#: 20/30 Date: 1/11/2023  Tx#: 21/30 Date: 1/13/2023  Tx#: 22/30 Date: 1/18/2023  Tx#: 23/30 Date: 1/20/2023  Tx#: 24/30   NuStep LE only L2-3-4-2 x 10 mins; NE    Shuttle: (B) LE leg press 7b x 20+15 reps; NE    Shuttle: (L) LE leg press 4b x 20+10 reps; NE    PERRI over rail x 10 reps; NE    Step up onto a 6 inch step with greenTB abduction resist (L) LE lead x 10 reps; NE tired/fatigued    Seated: (R/L) bicep curl 7 lbs x 20 reps; NE    PERRI over rail x 10 reps; NE NuStep LE only L2-3-4-5-2 x 10 mins; NE    Shuttle: (B) LE leg press 7b 2 x 20 reps; NE    Shuttle: (L) LE leg press 4b x 20+10 reps; NE    PERRI over rail x 10 reps; NE    bluefoam balance face forward and diagonal (R/L) side 3 x 15-20 cts ea; NE tired (shaky)    PERRI over rail x 10 reps; NE     NuStep LE only L2-3-4-5-# x 10 mins; NE    Shuttle: (B) LE leg press 7b 2 x 20 reps; NE    Shuttle: (L) LE leg press 4b 2 x 20 reps; NE    PERRI over rail x 10 reps; NE    bluefoam balance face forward and diagonal (R/L) side 3 x 15-20 cts ea; NE tired (shaky)    Monster walk with greenTB abd resist fwd/bwkd with CGA/Denise 2 laps x 10 feet: NE tired  NuStep LE only L2-3-4-5-# x 10 mins; NE    Shuttle: (B) LE leg press 7b 2 x 20 reps; NE    Shuttle: (L) LE leg press 4b 2 x 20 reps; NE    PERRI over rail x 10 reps; NE    Supine: (L) LE SLR 2 sets x 10 reps x 10 cts ea; NE tired    Supine: (B) LE bridging 2 sets x 10 reps x 10 cts ea; NE tired    Monster walk with greenTB abd resist fwd/bwkd with CGA/Denise 2 laps x 10 feet: NE tired    Seated: (R/L) UE bicep curl 7 lbs x 20 reps; NE     PERRI over rail x 10 reps; NE NuStep LE only L2-3-4-5-6 x 10 mins; NE    Shuttle: (B) LE leg press 8b x 20 reps; NE    Shuttle: (R) LE leg press 5b x 20 reps; NE    Shuttle: (L) LE leg press 4b 2 x 20 reps; NE    Step up onto a 6 inch step with blueTB abduction resist (L) LE lead 2 x 10 reps; NE tired/fatigued    Bluefoam balance forward facing with (B) LE together 3 x 20 cts ea; NE tired    Lateral walk with greenTB abduction resist 1 lap x 10 feet with CGA; NE tired    Seated: (R/L) bicep curl 7 lbs x 20 reps ea; NE    PERRI over rail x 10 reps; NE   NuStep LE only L2-3-4-5-6 x 10 mins; NE    Shuttle: (B) LE leg [id number] 2 x 20 reps; NE    Shuttle: (R/L) LE leg press 5b 2 x 20 reps; NE    PERRI over rail x 10 reps; NE    Monster walk with greenTB abd resist fwd/bwkd with CGA/Denise 2 laps x 10 feet: NE tired         Date: 1/25/2023  Tx#: 25/30 Date: 2/1/2023  Tx#: 26/30 Date: 2/3/2023  Tx#: 27/30      NuStep LE only L2-3-5-2 x 10 mins; NE    Shuttle: (B) LE leg press7-8b 2 x 20 reps; NE    Shuttle: (L) LE leg press 5b 2 x 20 reps; NE    PERRI over rail x 10 reps; NE    Step up onto a 6 inch step with greenTB abduction resist 1 rail support (L) LE lead x 10 reps + 8 inch step with CGA x 5 reps; NE tired/fatigued    Lateral walk with greenTB abduction resist 1 lap x 10 feet with CGA; NE tired    Seated: (R/L) UE bicep curl 7 lbs x 20 reps; NE NuStep LE only L2-3-5-2 x 10 mins; NE    Shuttle: (B) LE leg press 8b  x 20 reps; NE    Shuttle: (L) LE leg press 5b x 20 reps; NE    PERRI over rail x 10 reps; NE    Supine: (L) LE SLR 2 sets x 10 reps x 10 cts ea; NE tired    Supine: (B) LE bridging x 10 reps x 10 cts ea; NE tired       NuStep LE only L2-3-5-2 x 10 mins; NE    Shuttle: (B) LE leg press 8b  2 x 20 reps; NE    Shuttle: (L) LE leg press 5b 2 x 20 reps; NE    PERRI over rail x 10 reps; NE    Step up onto a 8 inch step with greenTB abduction resist 1 rail support (L) LE lead with CGA x 7 reps; NE tired/fatigued    Lateral walk with greenTB abduction resist 1 lap x 10 feet with CGA; NE tired    PERRI over rail x 10 rep; NE        Assessment/HEP:   Makeda Diamond was able to do step up onto an 8 inch step with greenTB abd resist 2 sets of 7 reps each. She was tired and she required CGA and cueing to lift (L) LE to clear the step consistently. She also need constant cueing to avoid \"plopping\" onto a chair during stand to sit transfers. All questions and concerns were answered and addressed at this time. Goals:   (18 visits)  1. Patient to consistently perform their HEP and it's progression to maintain their improved condition. 2. Patient to have reduced and abolished symptoms to to be able to bend down, rise up from sitting, take the first few steps in the morning, walk, and climb up/donw stairs without producing or increasing symptoms in the (L) groin, lateral hip/buttock, and anterior thigh. 3. Patient to have WNL of (L) Hip AROM in all directions with 4/5 MMT in all motions to promote all home, work, recreational, and functional activities without discomfort or deviation. Plan:   Re-assess next session and continue with (L) LE ROM, strengthening, stretching, gait training, balance training, and HEP progression. Charges:  TherEx x 4       Total Timed Treatment: 54 mins Total Treatment Time: 55 mins

## 2023-02-07 ENCOUNTER — OFFICE VISIT (OUTPATIENT)
Dept: PHYSICAL THERAPY | Age: 83
End: 2023-02-07
Attending: ORTHOPAEDIC SURGERY
Payer: MEDICARE

## 2023-02-07 PROCEDURE — 97110 THERAPEUTIC EXERCISES: CPT | Performed by: PHYSICAL THERAPIST

## 2023-02-07 NOTE — PROGRESS NOTES
Date: 2/7/2023         Dx: Status post open reduction and internal fixation (ORIF) of fracture (Z98.890,Z87.81)  LBP              Authorized # of Visits:  [id number] + 12 (Rx: 12/8/2022) = 30        Referring MD:  John Ortiz  Next MD visit: none scheduled    Medication Changes since last visit?: No    Subjective: Harini report that (B) LEs are tired tired and achy today. She feels unsteady when doing her exercise standing. She has difficulty lifting her (L) LE into a car while getting in. She state that she has been doing her SLR in bed still up to now. Objective:  (-) Homans (L) LE;  (-) Tenderness (L) hip/thigh and (R) knee/hip;  (-) Warmth; 0/10 (L) lateral buttock sitting and standing;  (+) ache (L>R) thighs walking.      Date: 10/12/2022  Tx#: 2/18 Date: 10/14/2022  Tx#: 3/18 Date: 10/19/2022  Tx#: 4/18 Date: 10/21/2022  Tx#: 5/18 Date: 10/25/2022  Tx#: 6/18 Date: 10/27/2022  Tx#: 7/18   Ambulation to bed/table x 20 feet with rolling walker; ache (L) LE during stance phase    Supine: (L) LE hamstring and quadricep isometric 10 reps x 10 cts ea; NE    Supine: (L) LE SLR with belt assist 10 reps x 5-10 cts ea; NE    Supine: hooklying with greenTB 10 reps x 10 cts ea; NE    Supine: (B) LE bridge 10 reps x 10 cts ea; NE    Supine to sit; cueing needed to maintain good form and posture    Gait training with a rolling walker in department; no ache/pain (L) LE (better) Ambulation to bed/table then to NuStep 2 x 20 feet with rolling walker; ache (L) LE during stance phase    NuStep LE only L1 x 10 mins; Dec, B    Ambulation to bed/mat less ache/pain (L) hip/thigh    Supine: (L) LE SLR 10+5 reps x 5 cts ea; NE    Supine: (L) hamstring sets 2 x 10 reps x 10 cts ea; NE tired    Supine: (B) LE bridge with hip abduction greenTB resist 10 reps x 10 cts ea; NE    SLY: (L) LE clam redTB 10 reps x 10 cts ea; NE tired    Seated: (L) hamstring curl redTB 10 reps x 10 cts ea; NE tired/cramping    Gait training with a rolling walker in department; P, NW ache/pain (L) LE (better)    PERRI over rail x 10 reps; NE Ambulation with RW with SBA x 60 feet; P, NW (L) hip    PERRI over rail x 10 reps;  NE    No change (L) hip ache during ambulation    NuStep LE L1-2 x 10 mins; P, NW    Supine: (L) LE SLR 10+5 reps x 10 cts ea; NE (R) thigh muscles working    Supine hooklying: bridge 3 reps x 5 cts ea; P, NW (L) hip    Supine hooklying: (B) LE hip abduction green-blueTB 2 x 10 reps x 10 cts ea; NE    Supine to sit transfer Denise/CGA    Seated: (B) LE abduction blueTB resist 10 reps x 10 cts ea; NE    Ambulation with RW with SBA x 25 feet; same ache (L) hip    Shuttle: (B) leg press 3b 2 x 10 reps; NE    PERRI oer rail x 10 reps; NE    Ambulation with RW with SBA x 25 feet less pain (L) hip  Ambulation with RW with SBA x 60 feet; P, NW (L) hip    NuStep LE only L1-2-3 x 10 mins; NE    PERRI over rail x 10 reps; NE    Supine: (L) SLR 10 reps x 10 cts ea; NE     + 1 lb 2 x 5 reps x 5 cts ea; P, NW (sore)    SLY: Clam greenTB 10 reps x 10 cts ea; NE tired    Shuttle: (B) leg press 3b 2 x 10 reps; NE    Shuttle: (L) leg press  2b x 10 reps; NE    Ambulation with RW with SBA x 25 feet; P, NW             Ambulation with RW with SBA x 30 feet; P, NW (L) hip    PERRI over rail 2 x 10 reps; P, NW (better) mid back     - less pain (L) hip during walking (1/10)    PERRI over rail x 10 reps more; P, NW (better)     - no pain (L) hip during waking    Seated: (R/L) bicep curl 6 lbs x 10 reps ea; NE     Lateral walk with redTB abd resist and CGA/Denise x 5 feet; tired/nervous    PERRI over rail x 10 reps; NE       Ambulation with RW with SBA x 30 feet; P, NW (L) hip    PERRI over rail 2 x 10 reps; Dec, B      - less pain walking    PERRI over rail x 10 reps more; Dec, B     - lesser pain walking (does not feel like giving out anymore)    Sitting posture correction with lumbar roll x 3 mins; NE (feels good)    Pronelying x 5 mins; Dec, A    Prone to sit to stand; no pain/ache    PERRI over rail x 10 reps; NE    - no pain/ache (L) LE walking            Date: 11/2/2022  Tx#: 8/18 Date: 11/9/2022  Tx#: 9/18 Date: 11/11/2022  Tx#: 10/18 Date: 11/16/2022  Tx#: 11/18 Date: 11/18/2022  Tx#: 12/18 Date: 11/29/2022  Tx#: 13/18   Ambulation with RW with SBA x 60 feet; P, NW (L) hip    NuStep LE only L2-3-4 x 10 mins; NE    PERRI over rail x 10 reps; NE    Pronelying x 5 mins; NE    Prone: (L) knee flexion x 10+7 reps; NE    Prone: (L) hip extension 10 reps x 2 cts ea; NE    Step up onto a 6 inch step with (L) LE lead 2 x 10 reps; NE    Ambulation with RW with SBA/Mod I x 60 feet; NE Ambulation with RW with SBA x 60 feet; P, NW (L) hip    NuStep LE only L2-3-4 x 10 mins; NE    PERRI over rail x 10 reps; Dec, NB    Pronelying x 3 mins; Dec, A (L) hip pain    DARRIN x 2 mins; NE (feels good in the (L) hip)    Step up onto a 4 inch step (L) LE lead with (R) hand rail support x 10 reps; NE    Shuttle: (L) LE leg press 3b 2 x 20 reps; NE     Scifit UE only L1 fwd/bkwd x 5 mins ea; NE    PERRI over rail x 10 reps; NE    Supine: (L) SLR 1 lb 2 x 10 reps x 10 cts ea; P, NW    Supine: (B) LE bridge with greenTB abd resist 10 reps x 10 cts ea; NE    Step up onto a 6 inch step (L) LE lead x 10 reps; NE    PERRI over rail x 10 reps; NE Ambulation with RW with SBA x 60 feet; P, NW (L) hip    NuStep LE only L2-3 x 10 mins; NE    Supine: (L) SLR 3 lbs 2 x 10 reps x 10 cts ea; P, NW (less painful)    Supine: (B) LE bridge with blueTB abd resist 10 reps x 10 cts ea; NE    Step up onto a 6 inch step (L) LE lead 2 x 10 reps; NE    Ambulation with RW with SBA/Mod I x 60 feet; NE    PERRI over rail x 10 reps; Dec, NB    Shuttle: (L) leg press 3b x 20+         Ambulation with RW with SBA x 60 feet; P, NW (L) hip    NuStep LE only L2-3-4-6-2 x 10 mins; NE    PERRI over rail x 10 reps; NE      Supine: (L) SLR 3 lbs 2 x 10 reps x 10 cts ea; P, NW (less painful)    Supine: (B) LE bridge with blueTB abd resist 10 reps x 10 cts ea; NE    Supine hooklying:  Alt LE knee lift x 10 reps; P, NW    Step up onto a 6 inch step (L) LE lead 2 x 10 reps; NE    PERRI over rail x 10 reps; NE   Ambulation with RW with SBA x 60 feet; P, NW (L) hip    NuStep LE only L2-3-4-6-2 x 10 mins; NE    Shuttle: (B) LE leg press 5b 2 x 20 reps; NE    Shuttle: (L) LE leg press 4b x 20 reps; NE    Gait training with RW with SBA x 60+30 feet     Step up onto a 6 inch step (L) LE lead with 2 lbs 2 x 10 reps; NE    Hydrant: (L) LE 1 lb ball 6 x 5 cts ea; NE tired    PERRI over rail x 10 reps; NE                        Date:12/1/202  Tx#: 14/18 Date: 12/7/2022  Tx#: 15/18 Date: 12/9/2022  Tx#: 16/18 Date: 12/13/2022  Tx#: 17/18 Date: 12/20/2022  Tx#: 18/30   NuStep LE only L2-3-4-6-2 x 10 mins; NE    Shuttle: (B) LE leg press 5b 2 x 20 reps; NE    Shuttle: (L) LE leg press 3b 2 x 20 reps; NE    Gait training with RW with SBA x 60+30 feet     Step up onto a 6 inch step (L) LE lead with 2 lbs 2 x 10 reps; NE    Seated: (L/R) repeated knee extension 10 reps x 5-10 cts ea; NE tired    PERRI over rail x 10 reps; NE NuStep LE only L2-3-4-5-2 x 10 mins; NE    Shuttle: (B) LE leg press 6b 2 x 20 reps; NE    Shuttle: (L) LE leg press 4b 2 x 10 reps; NE    Gait training with RW with SBA x 30+30+30 feet     Step up onto a 6 inch step (L) LE lead with 4 lbs 2 x 10 reps; NE tired    Seated: (R/L) bicep curls    PERRI over rail x 10 reps; NE NuStep LE only L2-3-4-5-2 x 10 mins; NE    Shuttle: (B) LE leg press 6b 2 x 20 reps; NE    Shuttle: (L) LE leg press 4b 2 x 10 reps; NE    Seated: (L/R) LAQ 10 reps x 10 cts ea; NE    Gait training with RW with SBA x 30+30 feet    Step up onto a 6 inch step (L) LE lead with 4 lbs x 10 reps; NE tired    Seated: (R/L) bicep curls 6 lbs x 20 reps ea; NE tired    PERRI over rail x 10 reps; NE NuStep LE only L2-3-4-5-2 x 10 mins; NE    Shuttle: (B) LE leg press 7b 2 x 10 reps; NE    Shuttle: (L) LE leg press 4b x 15+20 reps; NE    Seated: (L/R) LAQ 2 bs 10 reps x 10 cts ea; NE    Gait training with RW with SBA x 30+30 feet    Step up onto a 6 inch step (L) LE lead with 4 lbs x 10+5 reps; NE tired    Seated: (R/L) bicep curls, arm pulls 6 lbs x 20 reps ea; NE tired    PERRI over rail x 10 reps; NE NuStep LE only L1-2-3-2 x 10 mins; NE tired    Gait training with RW with SBA x 30+30 feet    Supine: (L/R) SLR 3-2 lbs 10 reps x 10 cts ea; P, NW (L)/ (R) NE tired    Supine hooklying: bridge with hip abduction with greenTB resist 10 reps x 10 cts ea; NE tired    Step up onto a 4 inch step with redTB abduction resist (R/L) LE lead x 10 reps; NE tired    Shuttle: (B) LE leg press 6b x 20 reps; NE tired    Shuttle: (R/L) LE leg press 4-3b x 20 reps ea; NE tired    PERRI over rail x 10 reps; NE     Date: 1/4/2023  Tx#: 19/30 Date: 1/6/2023  Tx#: 20/30 Date: 1/11/2023  Tx#: 21/30 Date: 1/13/2023  Tx#: 22/30 Date: 1/18/2023  Tx#: 23/30 Date: 1/20/2023  Tx#: 24/30   NuStep LE only L2-3-4-2 x 10 mins; NE    Shuttle: (B) LE leg press 7b x 20+15 reps; NE    Shuttle: (L) LE leg press 4b x 20+10 reps; NE    PERRI over rail x 10 reps; NE    Step up onto a 6 inch step with greenTB abduction resist (L) LE lead x 10 reps; NE tired/fatigued    Seated: (R/L) bicep curl 7 lbs x 20 reps; NE    PERRI over rail x 10 reps; NE NuStep LE only L2-3-4-5-2 x 10 mins; NE    Shuttle: (B) LE leg press 7b 2 x 20 reps; NE    Shuttle: (L) LE leg press 4b x 20+10 reps; NE    PERRI over rail x 10 reps; NE    bluefoam balance face forward and diagonal (R/L) side 3 x 15-20 cts ea; NE tired (shaky)    PERRI over rail x 10 reps; NE     NuStep LE only L2-3-4-5-# x 10 mins; NE    Shuttle: (B) LE leg press 7b 2 x 20 reps; NE    Shuttle: (L) LE leg press 4b 2 x 20 reps; NE    PERRI over rail x 10 reps; NE    bluefoam balance face forward and diagonal (R/L) side 3 x 15-20 cts ea; NE tired (shaky)    Monster walk with greenTB abd resist fwd/bwkd with CGA/Denise 2 laps x 10 feet: NE tired  NuStep LE only L2-3-4-5-# x 10 mins; NE    Shuttle: (B) LE leg press 7b 2 x 20 reps; NE    Shuttle: (L) LE leg press 4b 2 x 20 reps; NE    PERRI over rail x 10 reps; NE    Supine: (L) LE SLR 2 sets x 10 reps x 10 cts ea; NE tired    Supine: (B) LE bridging 2 sets x 10 reps x 10 cts ea; NE tired    Monster walk with greenTB abd resist fwd/bwkd with CGA/Denise 2 laps x 10 feet: NE tired    Seated: (R/L) UE bicep curl 7 lbs x 20 reps; NE     PERRI over rail x 10 reps; NE NuStep LE only L2-3-4-5-6 x 10 mins; NE    Shuttle: (B) LE leg press 8b x 20 reps; NE    Shuttle: (R) LE leg press 5b x 20 reps; NE    Shuttle: (L) LE leg press 4b 2 x 20 reps; NE    Step up onto a 6 inch step with blueTB abduction resist (L) LE lead 2 x 10 reps; NE tired/fatigued    Bluefoam balance forward facing with (B) LE together 3 x 20 cts ea; NE tired    Lateral walk with greenTB abduction resist 1 lap x 10 feet with CGA; NE tired    Seated: (R/L) bicep curl 7 lbs x 20 reps ea; NE    PERRI over rail x 10 reps; NE   NuStep LE only L2-3-4-5-6 x 10 mins; NE    Shuttle: (B) LE leg [id number] 2 x 20 reps; NE    Shuttle: (R/L) LE leg press 5b 2 x 20 reps; NE    PERRI over rail x 10 reps; NE    Monster walk with greenTB abd resist fwd/bwkd with CGA/Denise 2 laps x 10 feet: NE tired         Date: 1/25/2023  Tx#: 25/30 Date: 2/1/2023  Tx#: 26/30 Date: 2/3/2023  Tx#: 27/30 Date: 2/7/2023  Tx#: 28/30     NuStep LE only L2-3-5-2 x 10 mins; NE    Shuttle: (B) LE leg press7-8b 2 x 20 reps; NE    Shuttle: (L) LE leg press 5b 2 x 20 reps; NE    PERRI over rail x 10 reps; NE    Step up onto a 6 inch step with greenTB abduction resist 1 rail support (L) LE lead x 10 reps + 8 inch step with CGA x 5 reps; NE tired/fatigued    Lateral walk with greenTB abduction resist 1 lap x 10 feet with CGA; NE tired    Seated: (R/L) UE bicep curl 7 lbs x 20 reps; NE NuStep LE only L2-3-5-2 x 10 mins; NE    Shuttle: (B) LE leg press 8b  x 20 reps; NE    Shuttle: (L) LE leg press 5b x 20 reps; NE    PERRI over rail x 10 reps; NE    Supine: (L) LE SLR 2 sets x 10 reps x 10 cts ea; NE tired    Supine: (B) LE bridging x 10 reps x 10 cts ea; NE tired       NuStep LE only L2-3-5-2 x 10 mins; NE    Shuttle: (B) LE leg press 8b  2 x 20 reps; NE    Shuttle: (L) LE leg press 5b 2 x 20 reps; NE    PERRI over rail x 10 reps; NE    Step up onto a 8 inch step with greenTB abduction resist 1 rail support (L) LE lead with CGA x 7 reps; NE tired/fatigued    Lateral walk with greenTB abduction resist 1 lap x 10 feet with CGA; NE tired    PERRI over rail x 10 rep; NE NuStep LE only L2-3-5-2 x 10 mins; NE    Shuttle: (B) LE leg press 8b  2 x 20 reps; NE    Shuttle: (L) LE leg press 5b 2 x 20 reps; NE    PERRI over rail x 10 reps; NE    Step up onto a 6 inch step with greenTB abduction resist 1 rail support (L) LE lead with CGA 2 x 10 reps; NE tired/fatigued                   Assessment/HEP:   Tamar Felix was still able to do her exercises today but required longer periods of rest between exercises. She ambulates with a slow, steady gait with a rolling walker with SBA. She require cueing to correct posture (stooped forward) and to widen KEKE, to improve stability. She also \"plops\" in the chair when sitting which she has been cued to slow her stand to sit transfer. Goals:   (18 visits)  1. Patient to consistently perform their HEP and it's progression to maintain their improved condition. 2. Patient to have reduced and abolished symptoms to to be able to bend down, rise up from sitting, take the first few steps in the morning, walk, and climb up/donw stairs without producing or increasing symptoms in the (L) groin, lateral hip/buttock, and anterior thigh. 3. Patient to have WNL of (L) Hip AROM in all directions with 4/5 MMT in all motions to promote all home, work, recreational, and functional activities without discomfort or deviation.      Plan:   Re-assess next session and continue with (L) LE ROM, strengthening, stretching, gait training, balance training, and HEP progression. Charges:  TherEx x 3      Total Timed Treatment: 48 mins Total Treatment Time: 50 mins

## 2023-02-15 ENCOUNTER — OFFICE VISIT (OUTPATIENT)
Dept: PHYSICAL THERAPY | Age: 83
End: 2023-02-15
Attending: ORTHOPAEDIC SURGERY
Payer: MEDICARE

## 2023-02-15 PROCEDURE — 97110 THERAPEUTIC EXERCISES: CPT | Performed by: PHYSICAL THERAPIST

## 2023-02-15 NOTE — PROGRESS NOTES
Date: 2/15/2023         Dx: Status post open reduction and internal fixation (ORIF) of fracture (Z98.890,Z87.81)  LBP              Authorized # of Visits:  [id number] + 12 (Rx: 12/8/2022) = 30        Referring MD:  Braxton Tirado MD visit: none scheduled    Medication Changes since last visit?: No    Subjective: Peder Light report that she feel tired at this time. She also need to take her inhaler at this time because she is breathing a little heavy at this time. She state that she still can do her exercises. Objective:  (-) Homans (L) LE;  (-) Tenderness (L) hip/thigh and (R) knee/hip;  (-) Warmth; 0/10 (L) lateral buttock sitting and standing;  (+) ache (L>R) thighs walking.      Date: 10/12/2022  Tx#: 2/18 Date: 10/14/2022  Tx#: 3/18 Date: 10/19/2022  Tx#: 4/18 Date: 10/21/2022  Tx#: 5/18 Date: 10/25/2022  Tx#: 6/18 Date: 10/27/2022  Tx#: 7/18   Ambulation to bed/table x 20 feet with rolling walker; ache (L) LE during stance phase    Supine: (L) LE hamstring and quadricep isometric 10 reps x 10 cts ea; NE    Supine: (L) LE SLR with belt assist 10 reps x 5-10 cts ea; NE    Supine: hooklying with greenTB 10 reps x 10 cts ea; NE    Supine: (B) LE bridge 10 reps x 10 cts ea; NE    Supine to sit; cueing needed to maintain good form and posture    Gait training with a rolling walker in department; no ache/pain (L) LE (better) Ambulation to bed/table then to NuStep 2 x 20 feet with rolling walker; ache (L) LE during stance phase    NuStep LE only L1 x 10 mins; Dec, B    Ambulation to bed/mat less ache/pain (L) hip/thigh    Supine: (L) LE SLR 10+5 reps x 5 cts ea; NE    Supine: (L) hamstring sets 2 x 10 reps x 10 cts ea; NE tired    Supine: (B) LE bridge with hip abduction greenTB resist 10 reps x 10 cts ea; NE    SLY: (L) LE clam redTB 10 reps x 10 cts ea; NE tired    Seated: (L) hamstring curl redTB 10 reps x 10 cts ea; NE tired/cramping    Gait training with a rolling walker in department; P, NW ache/pain (L) LE (better)    PERRI over rail x 10 reps; NE Ambulation with RW with SBA x 60 feet; P, NW (L) hip    PERRI over rail x 10 reps;  NE    No change (L) hip ache during ambulation    NuStep LE L1-2 x 10 mins; P, NW    Supine: (L) LE SLR 10+5 reps x 10 cts ea; NE (R) thigh muscles working    Supine hooklying: bridge 3 reps x 5 cts ea; P, NW (L) hip    Supine hooklying: (B) LE hip abduction green-blueTB 2 x 10 reps x 10 cts ea; NE    Supine to sit transfer Denise/CGA    Seated: (B) LE abduction blueTB resist 10 reps x 10 cts ea; NE    Ambulation with RW with SBA x 25 feet; same ache (L) hip    Shuttle: (B) leg press 3b 2 x 10 reps; NE    PERRI oer rail x 10 reps; NE    Ambulation with RW with SBA x 25 feet less pain (L) hip  Ambulation with RW with SBA x 60 feet; P, NW (L) hip    NuStep LE only L1-2-3 x 10 mins; NE    PERRI over rail x 10 reps; NE    Supine: (L) SLR 10 reps x 10 cts ea; NE     + 1 lb 2 x 5 reps x 5 cts ea; P, NW (sore)    SLY: Clam greenTB 10 reps x 10 cts ea; NE tired    Shuttle: (B) leg press 3b 2 x 10 reps; NE    Shuttle: (L) leg press  2b x 10 reps; NE    Ambulation with RW with SBA x 25 feet; P, NW             Ambulation with RW with SBA x 30 feet; P, NW (L) hip    PERRI over rail 2 x 10 reps; P, NW (better) mid back     - less pain (L) hip during walking (1/10)    PERRI over rail x 10 reps more; P, NW (better)     - no pain (L) hip during waking    Seated: (R/L) bicep curl 6 lbs x 10 reps ea; NE     Lateral walk with redTB abd resist and CGA/Denise x 5 feet; tired/nervous    PERRI over rail x 10 reps; NE       Ambulation with RW with SBA x 30 feet; P, NW (L) hip    PERRI over rail 2 x 10 reps; Dec, B      - less pain walking    PERRI over rail x 10 reps more; Dec, B     - lesser pain walking (does not feel like giving out anymore)    Sitting posture correction with lumbar roll x 3 mins; NE (feels good)    Pronelying x 5 mins; Dec, A    Prone to sit to stand; no pain/ache    PERRI over rail x 10 reps; NE    - no pain/ache (L) LE walking            Date: 11/2/2022  Tx#: 8/18 Date: 11/9/2022  Tx#: 9/18 Date: 11/11/2022  Tx#: 10/18 Date: 11/16/2022  Tx#: 11/18 Date: 11/18/2022  Tx#: 12/18 Date: 11/29/2022  Tx#: 13/18   Ambulation with RW with SBA x 60 feet; P, NW (L) hip    NuStep LE only L2-3-4 x 10 mins; NE    PERRI over rail x 10 reps; NE    Pronelying x 5 mins; NE    Prone: (L) knee flexion x 10+7 reps; NE    Prone: (L) hip extension 10 reps x 2 cts ea; NE    Step up onto a 6 inch step with (L) LE lead 2 x 10 reps; NE    Ambulation with RW with SBA/Mod I x 60 feet; NE Ambulation with RW with SBA x 60 feet; P, NW (L) hip    NuStep LE only L2-3-4 x 10 mins; NE    PERRI over rail x 10 reps; Dec, NB    Pronelying x 3 mins; Dec, A (L) hip pain    DARRIN x 2 mins; NE (feels good in the (L) hip)    Step up onto a 4 inch step (L) LE lead with (R) hand rail support x 10 reps; NE    Shuttle: (L) LE leg press 3b 2 x 20 reps; NE     Scifit UE only L1 fwd/bkwd x 5 mins ea; NE    PERRI over rail x 10 reps; NE    Supine: (L) SLR 1 lb 2 x 10 reps x 10 cts ea; P, NW    Supine: (B) LE bridge with greenTB abd resist 10 reps x 10 cts ea; NE    Step up onto a 6 inch step (L) LE lead x 10 reps; NE    PERRI over rail x 10 reps; NE Ambulation with RW with SBA x 60 feet; P, NW (L) hip    NuStep LE only L2-3 x 10 mins; NE    Supine: (L) SLR 3 lbs 2 x 10 reps x 10 cts ea; P, NW (less painful)    Supine: (B) LE bridge with blueTB abd resist 10 reps x 10 cts ea; NE    Step up onto a 6 inch step (L) LE lead 2 x 10 reps; NE    Ambulation with RW with SBA/Mod I x 60 feet; NE    PERIR over rail x 10 reps; Dec, NB    Shuttle: (L) leg press 3b x 20+         Ambulation with RW with SBA x 60 feet; P, NW (L) hip    NuStep LE only L2-3-4-6-2 x 10 mins; NE    PERRI over rail x 10 reps; NE      Supine: (L) SLR 3 lbs 2 x 10 reps x 10 cts ea; P, NW (less painful)    Supine: (B) LE bridge with blueTB abd resist 10 reps x 10 cts ea; NE    Supine hooklying:  Alt LE knee lift x 10 reps; P, NW    Step up onto a 6 inch step (L) LE lead 2 x 10 reps; NE    PERRI over rail x 10 reps; NE   Ambulation with RW with SBA x 60 feet; P, NW (L) hip    NuStep LE only L2-3-4-6-2 x 10 mins; NE    Shuttle: (B) LE leg press 5b 2 x 20 reps; NE    Shuttle: (L) LE leg press 4b x 20 reps; NE    Gait training with RW with SBA x 60+30 feet     Step up onto a 6 inch step (L) LE lead with 2 lbs 2 x 10 reps; NE    Hydrant: (L) LE 1 lb ball 6 x 5 cts ea; NE tired    PERRI over rail x 10 reps; NE                        Date:12/1/202  Tx#: 14/18 Date: 12/7/2022  Tx#: 15/18 Date: 12/9/2022  Tx#: 16/18 Date: 12/13/2022  Tx#: 17/18 Date: 12/20/2022  Tx#: 18/30   NuStep LE only L2-3-4-6-2 x 10 mins; NE    Shuttle: (B) LE leg press 5b 2 x 20 reps; NE    Shuttle: (L) LE leg press 3b 2 x 20 reps; NE    Gait training with RW with SBA x 60+30 feet     Step up onto a 6 inch step (L) LE lead with 2 lbs 2 x 10 reps; NE    Seated: (L/R) repeated knee extension 10 reps x 5-10 cts ea; NE tired    PERRI over rail x 10 reps; NE NuStep LE only L2-3-4-5-2 x 10 mins; NE    Shuttle: (B) LE leg press 6b 2 x 20 reps; NE    Shuttle: (L) LE leg press 4b 2 x 10 reps; NE    Gait training with RW with SBA x 30+30+30 feet     Step up onto a 6 inch step (L) LE lead with 4 lbs 2 x 10 reps; NE tired    Seated: (R/L) bicep curls    PERRI over rail x 10 reps; NE NuStep LE only L2-3-4-5-2 x 10 mins; NE    Shuttle: (B) LE leg press 6b 2 x 20 reps; NE    Shuttle: (L) LE leg press 4b 2 x 10 reps; NE    Seated: (L/R) LAQ 10 reps x 10 cts ea; NE    Gait training with RW with SBA x 30+30 feet    Step up onto a 6 inch step (L) LE lead with 4 lbs x 10 reps; NE tired    Seated: (R/L) bicep curls 6 lbs x 20 reps ea; NE tired    PERRI over rail x 10 reps; NE NuStep LE only L2-3-4-5-2 x 10 mins; NE    Shuttle: (B) LE leg press 7b 2 x 10 reps; NE    Shuttle: (L) LE leg press 4b x 15+20 reps; NE    Seated: (L/R) LAQ 2 bs 10 reps x 10 cts ea; NE    Gait training with RW with SBA x 30+30 feet    Step up onto a 6 inch step (L) LE lead with 4 lbs x 10+5 reps; NE tired    Seated: (R/L) bicep curls, arm pulls 6 lbs x 20 reps ea; NE tired    PERRI over rail x 10 reps; NE NuStep LE only L1-2-3-2 x 10 mins; NE tired    Gait training with RW with SBA x 30+30 feet    Supine: (L/R) SLR 3-2 lbs 10 reps x 10 cts ea; P, NW (L)/ (R) NE tired    Supine hooklying: bridge with hip abduction with greenTB resist 10 reps x 10 cts ea; NE tired    Step up onto a 4 inch step with redTB abduction resist (R/L) LE lead x 10 reps; NE tired    Shuttle: (B) LE leg press 6b x 20 reps; NE tired    Shuttle: (R/L) LE leg press 4-3b x 20 reps ea; NE tired    PERRI over rail x 10 reps; NE     Date: 1/4/2023  Tx#: 19/30 Date: 1/6/2023  Tx#: 20/30 Date: 1/11/2023  Tx#: 21/30 Date: 1/13/2023  Tx#: 22/30 Date: 1/18/2023  Tx#: 23/30 Date: 1/20/2023  Tx#: 24/30   NuStep LE only L2-3-4-2 x 10 mins; NE    Shuttle: (B) LE leg press 7b x 20+15 reps; NE    Shuttle: (L) LE leg press 4b x 20+10 reps; NE    PERRI over rail x 10 reps; NE    Step up onto a 6 inch step with greenTB abduction resist (L) LE lead x 10 reps; NE tired/fatigued    Seated: (R/L) bicep curl 7 lbs x 20 reps; NE    PERRI over rail x 10 reps; NE NuStep LE only L2-3-4-5-2 x 10 mins; NE    Shuttle: (B) LE leg press 7b 2 x 20 reps; NE    Shuttle: (L) LE leg press 4b x 20+10 reps; NE    PERRI over rail x 10 reps; NE    bluefoam balance face forward and diagonal (R/L) side 3 x 15-20 cts ea; NE tired (shaky)    PERRI over rail x 10 reps; NE     NuStep LE only L2-3-4-5-# x 10 mins; NE    Shuttle: (B) LE leg press 7b 2 x 20 reps; NE    Shuttle: (L) LE leg press 4b 2 x 20 reps; NE    PERRI over rail x 10 reps; NE    bluefoam balance face forward and diagonal (R/L) side 3 x 15-20 cts ea; NE tired (shaky)    Monster walk with greenTB abd resist fwd/bwkd with CGA/Denise 2 laps x 10 feet: NE tired  NuStep LE only L2-3-4-5-# x 10 mins; NE    Shuttle: (B) LE leg press 7b 2 x 20 reps; NE    Shuttle: (L) LE leg press 4b 2 x 20 reps; NE    PERRI over rail x 10 reps; NE    Supine: (L) LE SLR 2 sets x 10 reps x 10 cts ea; NE tired    Supine: (B) LE bridging 2 sets x 10 reps x 10 cts ea; NE tired    Monster walk with greenTB abd resist fwd/bwkd with CGA/Denise 2 laps x 10 feet: NE tired    Seated: (R/L) UE bicep curl 7 lbs x 20 reps; NE     PERRI over rail x 10 reps; NE NuStep LE only L2-3-4-5-6 x 10 mins; NE    Shuttle: (B) LE leg press 8b x 20 reps; NE    Shuttle: (R) LE leg press 5b x 20 reps; NE    Shuttle: (L) LE leg press 4b 2 x 20 reps; NE    Step up onto a 6 inch step with blueTB abduction resist (L) LE lead 2 x 10 reps; NE tired/fatigued    Bluefoam balance forward facing with (B) LE together 3 x 20 cts ea; NE tired    Lateral walk with greenTB abduction resist 1 lap x 10 feet with CGA; NE tired    Seated: (R/L) bicep curl 7 lbs x 20 reps ea; NE    PERRI over rail x 10 reps; NE   NuStep LE only L2-3-4-5-6 x 10 mins; NE    Shuttle: (B) LE leg [id number] 2 x 20 reps; NE    Shuttle: (R/L) LE leg press 5b 2 x 20 reps; NE    PERRI over rail x 10 reps; NE    Monster walk with greenTB abd resist fwd/bwkd with CGA/Denise 2 laps x 10 feet: NE tired         Date: 1/25/2023  Tx#: 25/30 Date: 2/1/2023  Tx#: 26/30 Date: 2/3/2023  Tx#: 27/30 Date: 2/7/2023  Tx#: 28/30 Date: 2/15/2023  Tx#: 29/30    NuStep LE only L2-3-5-2 x 10 mins; NE    Shuttle: (B) LE leg press7-8b 2 x 20 reps; NE    Shuttle: (L) LE leg press 5b 2 x 20 reps; NE    PERRI over rail x 10 reps; NE    Step up onto a 6 inch step with greenTB abduction resist 1 rail support (L) LE lead x 10 reps + 8 inch step with CGA x 5 reps; NE tired/fatigued    Lateral walk with greenTB abduction resist 1 lap x 10 feet with CGA; NE tired    Seated: (R/L) UE bicep curl 7 lbs x 20 reps; NE NuStep LE only L2-3-5-2 x 10 mins; NE    Shuttle: (B) LE leg press 8b  x 20 reps; NE    Shuttle: (L) LE leg press 5b x 20 reps; NE    PERRI over rail x 10 reps; NE    Supine: (L) LE SLR 2 sets x 10 reps x 10 cts ea; NE tired    Supine: (B) LE bridging x 10 reps x 10 cts ea; NE tired       NuStep LE only L2-3-5-2 x 10 mins; NE    Shuttle: (B) LE leg press 8b  2 x 20 reps; NE    Shuttle: (L) LE leg press 5b 2 x 20 reps; NE    PERRI over rail x 10 reps; NE    Step up onto a 8 inch step with greenTB abduction resist 1 rail support (L) LE lead with CGA x 7 reps; NE tired/fatigued    Lateral walk with greenTB abduction resist 1 lap x 10 feet with CGA; NE tired    PERRI over rail x 10 rep; NE NuStep LE only L2-3-5-2 x 10 mins; NE    Shuttle: (B) LE leg press 8b  2 x 20 reps; NE    Shuttle: (L) LE leg press 5b 2 x 20 reps; NE    PERRI over rail x 10 reps; NE    Step up onto a 6 inch step with greenTB abduction resist 1 rail support (L) LE lead with CGA 2 x 10 reps; NE tired/fatigued             NuStep LE only L2-3-5-2 x 10 mins; NE    Shuttle: (B) LE leg press 8b  2 x 20 reps; NE    Shuttle: (L) LE leg press 5-6-5b 2 x 20 reps; NE    Shuttle: (R) LE leg press 6-5b x 15 reps; NE tired    Supine: (L) SLR with 4 lbs 2 sets x 5 reps x 5 cts ea; P, NW tired    Supine: (R) SLR with 4 lbs 10 reps x 10 cts ea; NE    PERRI over rail x 10 reps; NE      Assessment/HEP:   Vilma Steinberg was reviewed her supine (L/R) LE exercises in supine. She was reminded to perform them at home to strengthen both legs while she is laying down for safety. She felt an ache/sore in the (L) thigh lifting a 4 lb ankle weight. She was still able to ambulate safely with a rolling walker after her session. Cueing was needed to correct standing posture. All questions and concerns were answered and addressed at this time. Goals:   (18 visits)  1. Patient to consistently perform their HEP and it's progression to maintain their improved condition.   2. Patient to have reduced and abolished symptoms to to be able to bend down, rise up from sitting, take the first few steps in the morning, walk, and climb up/donw stairs without producing or increasing symptoms in the (L) groin, lateral hip/buttock, and anterior thigh. 3. Patient to have WNL of (L) Hip AROM in all directions with 4/5 MMT in all motions to promote all home, work, recreational, and functional activities without discomfort or deviation. Plan:   Re-assess next session and continue with (L) LE ROM, strengthening, stretching, gait training, balance training, and HEP progression. Charges:  TherEx x 3      Total Timed Treatment: 49 mins Total Treatment Time: 50 mins

## 2023-02-17 ENCOUNTER — OFFICE VISIT (OUTPATIENT)
Dept: PHYSICAL THERAPY | Age: 83
End: 2023-02-17
Attending: ORTHOPAEDIC SURGERY
Payer: MEDICARE

## 2023-02-17 PROCEDURE — 97110 THERAPEUTIC EXERCISES: CPT | Performed by: PHYSICAL THERAPIST

## 2023-02-17 NOTE — PROGRESS NOTES
Date: 2/17/2023         Dx: Status post open reduction and internal fixation (ORIF) of fracture (Z98.890,Z87.81)  LBP              Authorized # of Visits:  [id number] + 12 (Rx: 12/8/2022) = 30        Referring MD:  Lizette Poole  Next MD visit: none scheduled    Medication Changes since last visit?: No    Subjective: Corrinne Sherry report that her (L) hip is not hurting just feel weak. She is getting stronger every week but she still need more   Objective:  (-) Homans (L) LE;  (-) Tenderness (L) hip/thigh and (R) knee/hip;  (-) Warmth; 0/10 (L) lateral buttock sitting and standing;  (+) ache (L>R) thighs walking.      Date: 10/12/2022  Tx#: 2/18 Date: 10/14/2022  Tx#: 3/18 Date: 10/19/2022  Tx#: 4/18 Date: 10/21/2022  Tx#: 5/18 Date: 10/25/2022  Tx#: 6/18 Date: 10/27/2022  Tx#: 7/18   Ambulation to bed/table x 20 feet with rolling walker; ache (L) LE during stance phase    Supine: (L) LE hamstring and quadricep isometric 10 reps x 10 cts ea; NE    Supine: (L) LE SLR with belt assist 10 reps x 5-10 cts ea; NE    Supine: hooklying with greenTB 10 reps x 10 cts ea; NE    Supine: (B) LE bridge 10 reps x 10 cts ea; NE    Supine to sit; cueing needed to maintain good form and posture    Gait training with a rolling walker in department; no ache/pain (L) LE (better) Ambulation to bed/table then to NuStep 2 x 20 feet with rolling walker; ache (L) LE during stance phase    NuStep LE only L1 x 10 mins; Dec, B    Ambulation to bed/mat less ache/pain (L) hip/thigh    Supine: (L) LE SLR 10+5 reps x 5 cts ea; NE    Supine: (L) hamstring sets 2 x 10 reps x 10 cts ea; NE tired    Supine: (B) LE bridge with hip abduction greenTB resist 10 reps x 10 cts ea; NE    SLY: (L) LE clam redTB 10 reps x 10 cts ea; NE tired    Seated: (L) hamstring curl redTB 10 reps x 10 cts ea; NE tired/cramping    Gait training with a rolling walker in department; P, NW ache/pain (L) LE (better)    PERRI over rail x 10 reps; NE Ambulation with RW with SBA x 60 feet; P, NW (L) hip    PERRI over rail x 10 reps;  NE    No change (L) hip ache during ambulation    NuStep LE L1-2 x 10 mins; P, NW    Supine: (L) LE SLR 10+5 reps x 10 cts ea; NE (R) thigh muscles working    Supine hooklying: bridge 3 reps x 5 cts ea; P, NW (L) hip    Supine hooklying: (B) LE hip abduction green-blueTB 2 x 10 reps x 10 cts ea; NE    Supine to sit transfer Denise/CGA    Seated: (B) LE abduction blueTB resist 10 reps x 10 cts ea; NE    Ambulation with RW with SBA x 25 feet; same ache (L) hip    Shuttle: (B) leg press 3b 2 x 10 reps; NE    PERRI oer rail x 10 reps; NE    Ambulation with RW with SBA x 25 feet less pain (L) hip  Ambulation with RW with SBA x 60 feet; P, NW (L) hip    NuStep LE only L1-2-3 x 10 mins; NE    PERRI over rail x 10 reps; NE    Supine: (L) SLR 10 reps x 10 cts ea; NE     + 1 lb 2 x 5 reps x 5 cts ea; P, NW (sore)    SLY: Clam greenTB 10 reps x 10 cts ea; NE tired    Shuttle: (B) leg press 3b 2 x 10 reps; NE    Shuttle: (L) leg press  2b x 10 reps; NE    Ambulation with RW with SBA x 25 feet; P, NW             Ambulation with RW with SBA x 30 feet; P, NW (L) hip    PERRI over rail 2 x 10 reps; P, NW (better) mid back     - less pain (L) hip during walking (1/10)    PERRI over rail x 10 reps more; P, NW (better)     - no pain (L) hip during waking    Seated: (R/L) bicep curl 6 lbs x 10 reps ea; NE     Lateral walk with redTB abd resist and CGA/Denise x 5 feet; tired/nervous    PERRI over rail x 10 reps; NE       Ambulation with RW with SBA x 30 feet; P, NW (L) hip    PERRI over rail 2 x 10 reps; Dec, B      - less pain walking    PERRI over rail x 10 reps more; Dec, B     - lesser pain walking (does not feel like giving out anymore)    Sitting posture correction with lumbar roll x 3 mins; NE (feels good)    Pronelying x 5 mins; Dec, A    Prone to sit to stand; no pain/ache    PERRI over rail x 10 reps; NE    - no pain/ache (L) LE walking            Date: 11/2/2022  Tx#: 8/18 Date: 11/9/2022  Tx#: 9/18 Date: 11/11/2022  Tx#: 10/18 Date: 11/16/2022  Tx#: 11/18 Date: 11/18/2022  Tx#: 12/18 Date: 11/29/2022  Tx#: 13/18   Ambulation with RW with SBA x 60 feet; P, NW (L) hip    NuStep LE only L2-3-4 x 10 mins; NE    PERRI over rail x 10 reps; NE    Pronelying x 5 mins; NE    Prone: (L) knee flexion x 10+7 reps; NE    Prone: (L) hip extension 10 reps x 2 cts ea; NE    Step up onto a 6 inch step with (L) LE lead 2 x 10 reps; NE    Ambulation with RW with SBA/Mod I x 60 feet; NE Ambulation with RW with SBA x 60 feet; P, NW (L) hip    NuStep LE only L2-3-4 x 10 mins; NE    PERRI over rail x 10 reps; Dec, NB    Pronelying x 3 mins; Dec, A (L) hip pain    DARRIN x 2 mins; NE (feels good in the (L) hip)    Step up onto a 4 inch step (L) LE lead with (R) hand rail support x 10 reps; NE    Shuttle: (L) LE leg press 3b 2 x 20 reps; NE     Scifit UE only L1 fwd/bkwd x 5 mins ea; NE    PERRI over rail x 10 reps; NE    Supine: (L) SLR 1 lb 2 x 10 reps x 10 cts ea; P, NW    Supine: (B) LE bridge with greenTB abd resist 10 reps x 10 cts ea; NE    Step up onto a 6 inch step (L) LE lead x 10 reps; NE    PERRI over rail x 10 reps; NE Ambulation with RW with SBA x 60 feet; P, NW (L) hip    NuStep LE only L2-3 x 10 mins; NE    Supine: (L) SLR 3 lbs 2 x 10 reps x 10 cts ea; P, NW (less painful)    Supine: (B) LE bridge with blueTB abd resist 10 reps x 10 cts ea; NE    Step up onto a 6 inch step (L) LE lead 2 x 10 reps; NE    Ambulation with RW with SBA/Mod I x 60 feet; NE    PERRI over rail x 10 reps; Dec, NB    Shuttle: (L) leg press 3b x 20+         Ambulation with RW with SBA x 60 feet; P, NW (L) hip    NuStep LE only L2-3-4-6-2 x 10 mins; NE    PERRI over rail x 10 reps; NE      Supine: (L) SLR 3 lbs 2 x 10 reps x 10 cts ea; P, NW (less painful)    Supine: (B) LE bridge with blueTB abd resist 10 reps x 10 cts ea; NE    Supine hooklying:  Alt LE knee lift x 10 reps; P, NW    Step up onto a 6 inch step (L) LE lead 2 x 10 reps; NE    PERRI over rail x 10 reps; NE   Ambulation with RW with SBA x 60 feet; P, NW (L) hip    NuStep LE only L2-3-4-6-2 x 10 mins; NE    Shuttle: (B) LE leg press 5b 2 x 20 reps; NE    Shuttle: (L) LE leg press 4b x 20 reps; NE    Gait training with RW with SBA x 60+30 feet     Step up onto a 6 inch step (L) LE lead with 2 lbs 2 x 10 reps; NE    Hydrant: (L) LE 1 lb ball 6 x 5 cts ea; NE tired    PERRI over rail x 10 reps; NE                        Date:12/1/202  Tx#: 14/18 Date: 12/7/2022  Tx#: 15/18 Date: 12/9/2022  Tx#: 16/18 Date: 12/13/2022  Tx#: 17/18 Date: 12/20/2022  Tx#: 18/30   NuStep LE only L2-3-4-6-2 x 10 mins; NE    Shuttle: (B) LE leg press 5b 2 x 20 reps; NE    Shuttle: (L) LE leg press 3b 2 x 20 reps; NE    Gait training with RW with SBA x 60+30 feet     Step up onto a 6 inch step (L) LE lead with 2 lbs 2 x 10 reps; NE    Seated: (L/R) repeated knee extension 10 reps x 5-10 cts ea; NE tired    PERRI over rail x 10 reps; NE NuStep LE only L2-3-4-5-2 x 10 mins; NE    Shuttle: (B) LE leg press 6b 2 x 20 reps; NE    Shuttle: (L) LE leg press 4b 2 x 10 reps; NE    Gait training with RW with SBA x 30+30+30 feet     Step up onto a 6 inch step (L) LE lead with 4 lbs 2 x 10 reps; NE tired    Seated: (R/L) bicep curls    PERRI over rail x 10 reps; NE NuStep LE only L2-3-4-5-2 x 10 mins; NE    Shuttle: (B) LE leg press 6b 2 x 20 reps; NE    Shuttle: (L) LE leg press 4b 2 x 10 reps; NE    Seated: (L/R) LAQ 10 reps x 10 cts ea; NE    Gait training with RW with SBA x 30+30 feet    Step up onto a 6 inch step (L) LE lead with 4 lbs x 10 reps; NE tired    Seated: (R/L) bicep curls 6 lbs x 20 reps ea; NE tired    PERRI over rail x 10 reps; NE NuStep LE only L2-3-4-5-2 x 10 mins; NE    Shuttle: (B) LE leg press 7b 2 x 10 reps; NE    Shuttle: (L) LE leg press 4b x 15+20 reps; NE    Seated: (L/R) LAQ 2 bs 10 reps x 10 cts ea; NE    Gait training with RW with SBA x 30+30 feet    Step up onto a 6 inch step (L) LE lead with 4 lbs x 10+5 reps; NE tired    Seated: (R/L) bicep curls, arm pulls 6 lbs x 20 reps ea; NE tired    PERRI over rail x 10 reps; NE NuStep LE only L1-2-3-2 x 10 mins; NE tired    Gait training with RW with SBA x 30+30 feet    Supine: (L/R) SLR 3-2 lbs 10 reps x 10 cts ea; P, NW (L)/ (R) NE tired    Supine hooklying: bridge with hip abduction with greenTB resist 10 reps x 10 cts ea; NE tired    Step up onto a 4 inch step with redTB abduction resist (R/L) LE lead x 10 reps; NE tired    Shuttle: (B) LE leg press 6b x 20 reps; NE tired    Shuttle: (R/L) LE leg press 4-3b x 20 reps ea; NE tired    PERRI over rail x 10 reps; NE     Date: 1/4/2023  Tx#: 19/30 Date: 1/6/2023  Tx#: 20/30 Date: 1/11/2023  Tx#: 21/30 Date: 1/13/2023  Tx#: 22/30 Date: 1/18/2023  Tx#: 23/30 Date: 1/20/2023  Tx#: 24/30   NuStep LE only L2-3-4-2 x 10 mins; NE    Shuttle: (B) LE leg press 7b x 20+15 reps; NE    Shuttle: (L) LE leg press 4b x 20+10 reps; NE    PERRI over rail x 10 reps; NE    Step up onto a 6 inch step with greenTB abduction resist (L) LE lead x 10 reps; NE tired/fatigued    Seated: (R/L) bicep curl 7 lbs x 20 reps; NE    PERRI over rail x 10 reps; NE NuStep LE only L2-3-4-5-2 x 10 mins; NE    Shuttle: (B) LE leg press 7b 2 x 20 reps; NE    Shuttle: (L) LE leg press 4b x 20+10 reps; NE    PERRI over rail x 10 reps; NE    bluefoam balance face forward and diagonal (R/L) side 3 x 15-20 cts ea; NE tired (shaky)    PERRI over rail x 10 reps; NE     NuStep LE only L2-3-4-5-# x 10 mins; NE    Shuttle: (B) LE leg press 7b 2 x 20 reps; NE    Shuttle: (L) LE leg press 4b 2 x 20 reps; NE    PERRI over rail x 10 reps; NE    bluefoam balance face forward and diagonal (R/L) side 3 x 15-20 cts ea; NE tired (jenniferky)    Monster walk with greenTB abd resist fwd/bwkd with CGA/Denise 2 laps x 10 feet: NE tired  NuStep LE only L2-3-4-5-# x 10 mins; NE    Shuttle: (B) LE leg press 7b 2 x 20 reps; NE    Shuttle: (L) LE leg press 4b 2 x 20 reps; NE    PERRI over rail x 10 reps; NE    Supine: (L) LE SLR 2 sets x 10 reps x 10 cts ea; NE tired    Supine: (B) LE bridging 2 sets x 10 reps x 10 cts ea; NE tired    Monster walk with greenTB abd resist fwd/bwkd with CGA/Denise 2 laps x 10 feet: NE tired    Seated: (R/L) UE bicep curl 7 lbs x 20 reps; NE     PERRI over rail x 10 reps; NE NuStep LE only L2-3-4-5-6 x 10 mins; NE    Shuttle: (B) LE leg press 8b x 20 reps; NE    Shuttle: (R) LE leg press 5b x 20 reps; NE    Shuttle: (L) LE leg press 4b 2 x 20 reps; NE    Step up onto a 6 inch step with blueTB abduction resist (L) LE lead 2 x 10 reps; NE tired/fatigued    Bluefoam balance forward facing with (B) LE together 3 x 20 cts ea; NE tired    Lateral walk with greenTB abduction resist 1 lap x 10 feet with CGA; NE tired    Seated: (R/L) bicep curl 7 lbs x 20 reps ea; NE    PERRI over rail x 10 reps; NE   NuStep LE only L2-3-4-5-6 x 10 mins; NE    Shuttle: (B) LE leg cprvo1n 2 x 20 reps; NE    Shuttle: (R/L) LE leg press 5b 2 x 20 reps; NE    PERRI over rail x 10 reps; NE    Monster walk with greenTB abd resist fwd/bwkd with CGA/Denise 2 laps x 10 feet: NE tired         Date: 1/25/2023  Tx#: 25/30 Date: 2/1/2023  Tx#: 26/30 Date: 2/3/2023  Tx#: 27/30 Date: 2/7/2023  Tx#: 28/30 Date: 2/15/2023  Tx#: 29/30 Date: 2/17/2023  Tx#: 30/30   NuStep LE only L2-3-5-2 x 10 mins; NE    Shuttle: (B) LE leg press7-8b 2 x 20 reps; NE    Shuttle: (L) LE leg press 5b 2 x 20 reps; NE    PERRI over rail x 10 reps; NE    Step up onto a 6 inch step with greenTB abduction resist 1 rail support (L) LE lead x 10 reps + 8 inch step with CGA x 5 reps; NE tired/fatigued    Lateral walk with greenTB abduction resist 1 lap x 10 feet with CGA; NE tired    Seated: (R/L) UE bicep curl 7 lbs x 20 reps; NE NuStep LE only L2-3-5-2 x 10 mins; NE    Shuttle: (B) LE leg press 8b  x 20 reps; NE    Shuttle: (L) LE leg press 5b x 20 reps; NE    PERRI over rail x 10 reps; NE    Supine: (L) LE SLR 2 sets x 10 reps x 10 cts ea; NE tired    Supine: (B) LE bridging x 10 reps x 10 cts ea; NE tired       NuStep LE only L2-3-5-2 x 10 mins; NE    Shuttle: (B) LE leg press 8b  2 x 20 reps; NE    Shuttle: (L) LE leg press 5b 2 x 20 reps; NE    PERRI over rail x 10 reps; NE    Step up onto a 8 inch step with greenTB abduction resist 1 rail support (L) LE lead with CGA x 7 reps; NE tired/fatigued    Lateral walk with greenTB abduction resist 1 lap x 10 feet with CGA; NE tired    PERRI over rail x 10 rep; NE NuStep LE only L2-3-5-2 x 10 mins; NE    Shuttle: (B) LE leg press 8b  2 x 20 reps; NE    Shuttle: (L) LE leg press 5b 2 x 20 reps; NE    PERRI over rail x 10 reps; NE    Step up onto a 6 inch step with greenTB abduction resist 1 rail support (L) LE lead with CGA 2 x 10 reps; NE tired/fatigued             NuStep LE only L2-3-5-2 x 10 mins; NE    Shuttle: (B) LE leg press 8b  2 x 20 reps; NE    Shuttle: (L) LE leg press 5-6-5b 2 x 20 reps; NE    Shuttle: (R) LE leg press 6-5b x 15 reps; NE tired    Supine: (L) SLR with 4 lbs 2 sets x 5 reps x 5 cts ea; P, NW tired    Supine: (R) SLR with 4 lbs 10 reps x 10 cts ea; NE    PERRI over rail x 10 reps; NE NuStep LE only L2-4-7-8-2 x 10 mins; NE    Shuttle: (B) LE leg press 8b  2 x 20 reps; NE    Shuttle: (L) LE leg press 5-6-5b 2 x 20 reps; NE    Shuttle: (R) LE leg press 6b x 20 reps; NE tired    PERRI over rail x 11 reps; NE    (B) UE blackTB n.row 10 reps x 10 cts; NE    Standing (L) LE step up onto an 8 inch step 1 rail assist with 5 lb x 10 reps; NE tired       Assessment/HEP:   Rickey Newman has attended 30 physical therapy sessions from 10/7/2022 to 2/17/2023. She has WF of (L) hip AROM in all directions with 3+/5 to 4-/5 MMT in all motions. She does not report any pain or ache in the (L) hip but occasionally would feel tightness and weakness depending on the time of day and activity during the day. She is ambulating with a rolling walker with a wider KEKE, equal step length, and heel-toe pattern.   She has a stooped forward posture but can be corrected with cueing. Cueing also required to promote safety during sit to stand to sit transfers. She continue to slowly progress with (L) LE ROM, strengthening, stability, balance, proprioceptive, and gait training exercises. She will benefit to continue with physical therapy to progress with treatment to attain all goals. Goals: - PARTIALLY MET  (30 visits)  1. Patient to consistently perform their HEP and it's progression to maintain their improved condition. 2. Patient to have reduced and abolished symptoms to to be able to bend down, rise up from sitting, take the first few steps in the morning, walk, and climb up/donw stairs without producing or increasing symptoms in the (L) groin, lateral hip/buttock, and anterior thigh. 3. Patient to have WNL of (L) Hip AROM in all directions with 4/5 MMT in all motions to promote all home, work, recreational, and functional activities without discomfort or deviation. Plan:   Continue with 8 more physical therapy sessions with (L) LE ROM, strengthening, stretching, gait training, balance training, and HEP progression. Charges:  TherEx x 3      Total Timed Treatment: 47 mins Total Treatment Time: 50 mins

## 2023-02-22 ENCOUNTER — TELEPHONE (OUTPATIENT)
Dept: PHYSICAL THERAPY | Facility: HOSPITAL | Age: 83
End: 2023-02-22

## 2023-02-23 ENCOUNTER — OFFICE VISIT (OUTPATIENT)
Dept: PHYSICAL THERAPY | Age: 83
End: 2023-02-23
Attending: ORTHOPAEDIC SURGERY
Payer: MEDICARE

## 2023-02-23 PROCEDURE — 97110 THERAPEUTIC EXERCISES: CPT | Performed by: PHYSICAL THERAPIST

## 2023-02-23 NOTE — PROGRESS NOTES
Date: 2/23/2023         Dx: Status post open reduction and internal fixation (ORIF) of fracture (Z98.890,Z87.81)  LBP              Authorized # of Visits:  18 + 12 (Rx: 12/8/2022) = 30        Referring MD:  Leonardo Mejia  Next MD visit: none scheduled    Medication Changes since last visit?: No    Subjective: Harini report that her (L) hip is achy and a little tight at this time. She also does not feel that strong and good today. She still would like to do some exercises today.        Objective:  (-) Homans (L) LE;  (-) Tenderness (L) hip/thigh and (R) knee/hip;  (-) Warmth; 0/10 (L) lateral buttock sitting and standing;  (+) (L) hip tightness/minimal ache sit to stand transfer only;  BP: 139/79 mmhg     Date: 10/12/2022  Tx#: 2/18 Date: 10/14/2022  Tx#: 3/18 Date: 10/19/2022  Tx#: 4/18 Date: 10/21/2022  Tx#: 5/18 Date: 10/25/2022  Tx#: 6/18 Date: 10/27/2022  Tx#: 7/18   Ambulation to bed/table x 20 feet with rolling walker; ache (L) LE during stance phase    Supine: (L) LE hamstring and quadricep isometric 10 reps x 10 cts ea; NE    Supine: (L) LE SLR with belt assist 10 reps x 5-10 cts ea; NE    Supine: hooklying with greenTB 10 reps x 10 cts ea; NE    Supine: (B) LE bridge 10 reps x 10 cts ea; NE    Supine to sit; cueing needed to maintain good form and posture    Gait training with a rolling walker in department; no ache/pain (L) LE (better) Ambulation to bed/table then to NuStep 2 x 20 feet with rolling walker; ache (L) LE during stance phase    NuStep LE only L1 x 10 mins; Dec, B    Ambulation to bed/mat less ache/pain (L) hip/thigh    Supine: (L) LE SLR 10+5 reps x 5 cts ea; NE    Supine: (L) hamstring sets 2 x 10 reps x 10 cts ea; NE tired    Supine: (B) LE bridge with hip abduction greenTB resist 10 reps x 10 cts ea; NE    SLY: (L) LE clam redTB 10 reps x 10 cts ea; NE tired    Seated: (L) hamstring curl redTB 10 reps x 10 cts ea; NE tired/cramping    Gait training with a rolling walker in department; P, NW ache/pain (L) LE (better)    PERRI over rail x 10 reps; NE Ambulation with RW with SBA x 60 feet; P, NW (L) hip    PERRI over rail x 10 reps;  NE    No change (L) hip ache during ambulation    NuStep LE L1-2 x 10 mins; P, NW    Supine: (L) LE SLR 10+5 reps x 10 cts ea; NE (R) thigh muscles working    Supine hooklying: bridge 3 reps x 5 cts ea; P, NW (L) hip    Supine hooklying: (B) LE hip abduction green-blueTB 2 x 10 reps x 10 cts ea; NE    Supine to sit transfer Denise/CGA    Seated: (B) LE abduction blueTB resist 10 reps x 10 cts ea; NE    Ambulation with RW with SBA x 25 feet; same ache (L) hip    Shuttle: (B) leg press 3b 2 x 10 reps; NE    PERRI oer rail x 10 reps; NE    Ambulation with RW with SBA x 25 feet less pain (L) hip  Ambulation with RW with SBA x 60 feet; P, NW (L) hip    NuStep LE only L1-2-3 x 10 mins; NE    PERRI over rail x 10 reps; NE    Supine: (L) SLR 10 reps x 10 cts ea; NE     + 1 lb 2 x 5 reps x 5 cts ea; P, NW (sore)    SLY: Clam greenTB 10 reps x 10 cts ea; NE tired    Shuttle: (B) leg press 3b 2 x 10 reps; NE    Shuttle: (L) leg press  2b x 10 reps; NE    Ambulation with RW with SBA x 25 feet; P, NW             Ambulation with RW with SBA x 30 feet; P, NW (L) hip    PERRI over rail 2 x 10 reps; P, NW (better) mid back     - less pain (L) hip during walking (1/10)    PERRI over rail x 10 reps more; P, NW (better)     - no pain (L) hip during waking    Seated: (R/L) bicep curl 6 lbs x 10 reps ea; NE     Lateral walk with redTB abd resist and CGA/Denise x 5 feet; tired/nervous    PERRI over rail x 10 reps; NE       Ambulation with RW with SBA x 30 feet; P, NW (L) hip    PERRI over rail 2 x 10 reps; Dec, B      - less pain walking    PERRI over rail x 10 reps more; Dec, B     - lesser pain walking (does not feel like giving out anymore)    Sitting posture correction with lumbar roll x 3 mins; NE (feels good)    Pronelying x 5 mins; Dec, A    Prone to sit to stand; no pain/ache    PERRI over rail x 10 reps; NE    - no pain/ache (L) LE walking            Date: 11/2/2022  Tx#: 8/18 Date: 11/9/2022  Tx#: 9/18 Date: 11/11/2022  Tx#: 10/18 Date: 11/16/2022  Tx#: 11/18 Date: 11/18/2022  Tx#: 12/18 Date: 11/29/2022  Tx#: 13/18   Ambulation with RW with SBA x 60 feet; P, NW (L) hip    NuStep LE only L2-3-4 x 10 mins; NE    PERRI over rail x 10 reps; NE    Pronelying x 5 mins; NE    Prone: (L) knee flexion x 10+7 reps; NE    Prone: (L) hip extension 10 reps x 2 cts ea; NE    Step up onto a 6 inch step with (L) LE lead 2 x 10 reps; NE    Ambulation with RW with SBA/Mod I x 60 feet; NE Ambulation with RW with SBA x 60 feet; P, NW (L) hip    NuStep LE only L2-3-4 x 10 mins; NE    PERRI over rail x 10 reps; Dec, NB    Pronelying x 3 mins; Dec, A (L) hip pain    DARRIN x 2 mins; NE (feels good in the (L) hip)    Step up onto a 4 inch step (L) LE lead with (R) hand rail support x 10 reps; NE    Shuttle: (L) LE leg press 3b 2 x 20 reps; NE     Scifit UE only L1 fwd/bkwd x 5 mins ea; NE    PERRI over rail x 10 reps; NE    Supine: (L) SLR 1 lb 2 x 10 reps x 10 cts ea; P, NW    Supine: (B) LE bridge with greenTB abd resist 10 reps x 10 cts ea; NE    Step up onto a 6 inch step (L) LE lead x 10 reps; NE    PERRI over rail x 10 reps; NE Ambulation with RW with SBA x 60 feet; P, NW (L) hip    NuStep LE only L2-3 x 10 mins; NE    Supine: (L) SLR 3 lbs 2 x 10 reps x 10 cts ea; P, NW (less painful)    Supine: (B) LE bridge with blueTB abd resist 10 reps x 10 cts ea; NE    Step up onto a 6 inch step (L) LE lead 2 x 10 reps; NE    Ambulation with RW with SBA/Mod I x 60 feet; NE    PERRI over rail x 10 reps; Dec, NB    Shuttle: (L) leg press 3b x 20+         Ambulation with RW with SBA x 60 feet; P, NW (L) hip    NuStep LE only L2-3-4-6-2 x 10 mins; NE    PERRI over rail x 10 reps; NE      Supine: (L) SLR 3 lbs 2 x 10 reps x 10 cts ea; P, NW (less painful)    Supine: (B) LE bridge with blueTB abd resist 10 reps x 10 cts ea; NE    Supine hooklying: Alt LE knee lift x 10 reps; P, NW    Step up onto a 6 inch step (L) LE lead 2 x 10 reps; NE    PERRI over rail x 10 reps; NE   Ambulation with RW with SBA x 60 feet; P, NW (L) hip    NuStep LE only L2-3-4-6-2 x 10 mins; NE    Shuttle: (B) LE leg press 5b 2 x 20 reps; NE    Shuttle: (L) LE leg press 4b x 20 reps; NE    Gait training with RW with SBA x 60+30 feet     Step up onto a 6 inch step (L) LE lead with 2 lbs 2 x 10 reps; NE    Hydrant: (L) LE 1 lb ball 6 x 5 cts ea; NE tired    PERRI over rail x 10 reps; NE                        Date:12/1/202  Tx#: 14/18 Date: 12/7/2022  Tx#: 15/18 Date: 12/9/2022  Tx#: 16/18 Date: 12/13/2022  Tx#: 17/18 Date: 12/20/2022  Tx#: 18/30   NuStep LE only L2-3-4-6-2 x 10 mins; NE    Shuttle: (B) LE leg press 5b 2 x 20 reps; NE    Shuttle: (L) LE leg press 3b 2 x 20 reps; NE    Gait training with RW with SBA x 60+30 feet     Step up onto a 6 inch step (L) LE lead with 2 lbs 2 x 10 reps; NE    Seated: (L/R) repeated knee extension 10 reps x 5-10 cts ea; NE tired    PERRI over rail x 10 reps; NE NuStep LE only L2-3-4-5-2 x 10 mins; NE    Shuttle: (B) LE leg press 6b 2 x 20 reps; NE    Shuttle: (L) LE leg press 4b 2 x 10 reps; NE    Gait training with RW with SBA x 30+30+30 feet     Step up onto a 6 inch step (L) LE lead with 4 lbs 2 x 10 reps; NE tired    Seated: (R/L) bicep curls    PERRI over rail x 10 reps; NE NuStep LE only L2-3-4-5-2 x 10 mins; NE    Shuttle: (B) LE leg press 6b 2 x 20 reps; NE    Shuttle: (L) LE leg press 4b 2 x 10 reps; NE    Seated: (L/R) LAQ 10 reps x 10 cts ea; NE    Gait training with RW with SBA x 30+30 feet    Step up onto a 6 inch step (L) LE lead with 4 lbs x 10 reps; NE tired    Seated: (R/L) bicep curls 6 lbs x 20 reps ea; NE tired    PERRI over rail x 10 reps; NE NuStep LE only L2-3-4-5-2 x 10 mins; NE    Shuttle: (B) LE leg press 7b 2 x 10 reps; NE    Shuttle: (L) LE leg press 4b x 15+20 reps; NE    Seated: (L/R) LAQ 2 bs 10 reps x 10 cts ea; NE    Gait training with RW with SBA x 30+30 feet    Step up onto a 6 inch step (L) LE lead with 4 lbs x 10+5 reps; NE tired    Seated: (R/L) bicep curls, arm pulls 6 lbs x 20 reps ea; NE tired    PERRI over rail x 10 reps; NE NuStep LE only L1-2-3-2 x 10 mins; NE tired    Gait training with RW with SBA x 30+30 feet    Supine: (L/R) SLR 3-2 lbs 10 reps x 10 cts ea; P, NW (L)/ (R) NE tired    Supine hooklying: bridge with hip abduction with greenTB resist 10 reps x 10 cts ea; NE tired    Step up onto a 4 inch step with redTB abduction resist (R/L) LE lead x 10 reps; NE tired    Shuttle: (B) LE leg press 6b x 20 reps; NE tired    Shuttle: (R/L) LE leg press 4-3b x 20 reps ea; NE tired    PERRI over rail x 10 reps; NE     Date: 1/4/2023  Tx#: 19/30 Date: 1/6/2023  Tx#: 20/30 Date: 1/11/2023  Tx#: 21/30 Date: 1/13/2023  Tx#: 22/30 Date: 1/18/2023  Tx#: 23/30 Date: 1/20/2023  Tx#: 24/30   NuStep LE only L2-3-4-2 x 10 mins; NE    Shuttle: (B) LE leg press 7b x 20+15 reps; NE    Shuttle: (L) LE leg press 4b x 20+10 reps; NE    PERRI over rail x 10 reps; NE    Step up onto a 6 inch step with greenTB abduction resist (L) LE lead x 10 reps; NE tired/fatigued    Seated: (R/L) bicep curl 7 lbs x 20 reps; NE    PERRI over rail x 10 reps; NE NuStep LE only L2-3-4-5-2 x 10 mins; NE    Shuttle: (B) LE leg press 7b 2 x 20 reps; NE    Shuttle: (L) LE leg press 4b x 20+10 reps; NE    PERRI over rail x 10 reps; NE    bluefoam balance face forward and diagonal (R/L) side 3 x 15-20 cts ea; NE tired (shaky)    PERRI over rail x 10 reps; NE     NuStep LE only L2-3-4-5-# x 10 mins; NE    Shuttle: (B) LE leg press 7b 2 x 20 reps; NE    Shuttle: (L) LE leg press 4b 2 x 20 reps; NE    PERRI over rail x 10 reps; NE    bluefoam balance face forward and diagonal (R/L) side 3 x 15-20 cts ea; NE tired (shaky)    Monster walk with greenTB abd resist fwd/bwkd with CGA/Denise 2 laps x 10 feet: NE tired  NuStep LE only L2-3-4-5-# x 10 mins; NE    Shuttle: (B) LE leg press 7b 2 x 20 reps; NE    Shuttle: (L) LE leg press 4b 2 x 20 reps; NE    PERRI over rail x 10 reps; NE    Supine: (L) LE SLR 2 sets x 10 reps x 10 cts ea; NE tired    Supine: (B) LE bridging 2 sets x 10 reps x 10 cts ea; NE tired    Monster walk with greenTB abd resist fwd/bwkd with CGA/Denise 2 laps x 10 feet: NE tired    Seated: (R/L) UE bicep curl 7 lbs x 20 reps; NE     PERRI over rail x 10 reps; NE NuStep LE only L2-3-4-5-6 x 10 mins; NE    Shuttle: (B) LE leg press 8b x 20 reps; NE    Shuttle: (R) LE leg press 5b x 20 reps; NE    Shuttle: (L) LE leg press 4b 2 x 20 reps; NE    Step up onto a 6 inch step with blueTB abduction resist (L) LE lead 2 x 10 reps; NE tired/fatigued    Bluefoam balance forward facing with (B) LE together 3 x 20 cts ea; NE tired    Lateral walk with greenTB abduction resist 1 lap x 10 feet with CGA; NE tired    Seated: (R/L) bicep curl 7 lbs x 20 reps ea; NE    PERRI over rail x 10 reps; NE   NuStep LE only L2-3-4-5-6 x 10 mins; NE    Shuttle: (B) LE leg [id number] 2 x 20 reps; NE    Shuttle: (R/L) LE leg press 5b 2 x 20 reps; NE    PERRI over rail x 10 reps; NE    Monster walk with greenTB abd resist fwd/bwkd with CGA/Denise 2 laps x 10 feet: NE tired         Date: 1/25/2023  Tx#: 25/30 Date: 2/1/2023  Tx#: 26/30 Date: 2/3/2023  Tx#: 27/30 Date: 2/7/2023  Tx#: 28/30 Date: 2/15/2023  Tx#: 29/30 Date: 2/17/2023  Tx#: 30/30   NuStep LE only L2-3-5-2 x 10 mins; NE    Shuttle: (B) LE leg press7-8b 2 x 20 reps; NE    Shuttle: (L) LE leg press 5b 2 x 20 reps; NE    PERRI over rail x 10 reps; NE    Step up onto a 6 inch step with greenTB abduction resist 1 rail support (L) LE lead x 10 reps + 8 inch step with CGA x 5 reps; NE tired/fatigued    Lateral walk with greenTB abduction resist 1 lap x 10 feet with CGA; NE tired    Seated: (R/L) UE bicep curl 7 lbs x 20 reps; NE NuStep LE only L2-3-5-2 x 10 mins; NE    Shuttle: (B) LE leg press 8b  x 20 reps; NE    Shuttle: (L) LE leg press 5b x 20 reps; NE    PERIR over rail x 10 reps; NE    Supine: (L) LE SLR 2 sets x 10 reps x 10 cts ea; NE tired    Supine: (B) LE bridging x 10 reps x 10 cts ea; NE tired       NuStep LE only L2-3-5-2 x 10 mins; NE    Shuttle: (B) LE leg press 8b  2 x 20 reps; NE    Shuttle: (L) LE leg press 5b 2 x 20 reps; NE    PERRI over rail x 10 reps; NE    Step up onto a 8 inch step with greenTB abduction resist 1 rail support (L) LE lead with CGA x 7 reps; NE tired/fatigued    Lateral walk with greenTB abduction resist 1 lap x 10 feet with CGA; NE tired    PERRI over rail x 10 rep; NE NuStep LE only L2-3-5-2 x 10 mins; NE    Shuttle: (B) LE leg press 8b  2 x 20 reps; NE    Shuttle: (L) LE leg press 5b 2 x 20 reps; NE    PERRI over rail x 10 reps; NE    Step up onto a 6 inch step with greenTB abduction resist 1 rail support (L) LE lead with CGA 2 x 10 reps; NE tired/fatigued             NuStep LE only L2-3-5-2 x 10 mins; NE    Shuttle: (B) LE leg press 8b  2 x 20 reps; NE    Shuttle: (L) LE leg press 5-6-5b 2 x 20 reps; NE    Shuttle: (R) LE leg press 6-5b x 15 reps; NE tired    Supine: (L) SLR with 4 lbs 2 sets x 5 reps x 5 cts ea; P, NW tired    Supine: (R) SLR with 4 lbs 10 reps x 10 cts ea; NE    PERRI over rail x 10 reps; NE NuStep LE only L2-4-7-8-2 x 10 mins; NE    Shuttle: (B) LE leg press 8b  2 x 20 reps; NE    Shuttle: (L) LE leg press 5-6-5b 2 x 20 reps; NE    Shuttle: (R) LE leg press 6b x 20 reps; NE tired    PERRI over rail x 11 reps; NE    (B) UE blackTB n.row 10 reps x 10 cts; NE    Standing (L) LE step up onto an 8 inch step 1 rail assist with 5 lb x 10 reps; NE tired       Date: 2/23/2023  Tx#: 31/       NuStep LE only L2-4-7-8-2 x 10 mins; NE    Shuttle: (B) LE leg press 8b  2 x 20 reps; NE    Shuttle: (L) LE leg press 5b 2 x 20 reps; NE    Shuttle: (R) LE leg press 6b x 20 reps; NE    PERRI over rail x 11 reps; NE         Assessment/HEP:   Harini felt good/better after warm up with the NuStep machine.   She has still has a difficult time lifting her (L) LE on a step but able to with longer time. She was treated with light exercises today for the (L) LE due to feeling of not so good today. She ambulates with a rolling walker with a wider KEKE and even step length but with stooped forward posture. She has dried up blood in her (R) ear,  was made aware. Patient denies any pain, ache, or tenderness in ear or head. She was also tired after her session today. Goals: - PARTIALLY MET  (30 visits)  1. Patient to consistently perform their HEP and it's progression to maintain their improved condition. 2. Patient to have reduced and abolished symptoms to to be able to bend down, rise up from sitting, take the first few steps in the morning, walk, and climb up/donw stairs without producing or increasing symptoms in the (L) groin, lateral hip/buttock, and anterior thigh. 3. Patient to have WNL of (L) Hip AROM in all directions with 4/5 MMT in all motions to promote all home, work, recreational, and functional activities without discomfort or deviation. Plan:   Continue with with (L) LE ROM, strengthening, stretching, gait training, balance training, and HEP progression. Charges:  TherEx x 3      Total Timed Treatment: 38 mins Total Treatment Time: 40 mins

## 2023-02-27 ENCOUNTER — TELEPHONE (OUTPATIENT)
Dept: PHYSICAL THERAPY | Facility: HOSPITAL | Age: 83
End: 2023-02-27

## 2023-03-02 ENCOUNTER — APPOINTMENT (OUTPATIENT)
Dept: PHYSICAL THERAPY | Age: 83
End: 2023-03-02
Attending: ORTHOPAEDIC SURGERY
Payer: MEDICARE

## 2023-03-08 ENCOUNTER — OFFICE VISIT (OUTPATIENT)
Dept: PHYSICAL THERAPY | Age: 83
End: 2023-03-08
Attending: ORTHOPAEDIC SURGERY
Payer: MEDICARE

## 2023-03-15 ENCOUNTER — APPOINTMENT (OUTPATIENT)
Dept: PHYSICAL THERAPY | Age: 83
End: 2023-03-15
Attending: ORTHOPAEDIC SURGERY
Payer: MEDICARE

## 2023-05-26 ENCOUNTER — ORDER TRANSCRIPTION (OUTPATIENT)
Dept: PHYSICAL THERAPY | Facility: HOSPITAL | Age: 83
End: 2023-05-26

## 2023-05-26 DIAGNOSIS — Z87.81 STATUS POST OPEN REDUCTION AND INTERNAL FIXATION (ORIF) OF FRACTURE: Primary | ICD-10-CM

## 2023-05-26 DIAGNOSIS — Z98.890 STATUS POST OPEN REDUCTION AND INTERNAL FIXATION (ORIF) OF FRACTURE: Primary | ICD-10-CM

## 2023-06-20 ENCOUNTER — ORDER TRANSCRIPTION (OUTPATIENT)
Dept: PHYSICAL THERAPY | Facility: HOSPITAL | Age: 83
End: 2023-06-20

## 2023-06-20 DIAGNOSIS — G60.9 IDIOPATHIC PERIPHERAL NEUROPATHY: Primary | ICD-10-CM

## 2023-06-28 ENCOUNTER — TELEPHONE (OUTPATIENT)
Dept: PHYSICAL THERAPY | Facility: HOSPITAL | Age: 83
End: 2023-06-28

## 2023-06-30 ENCOUNTER — APPOINTMENT (OUTPATIENT)
Dept: PHYSICAL THERAPY | Age: 83
End: 2023-06-30
Attending: ORTHOPAEDIC SURGERY
Payer: MEDICARE

## 2023-07-11 ENCOUNTER — OFFICE VISIT (OUTPATIENT)
Dept: PHYSICAL THERAPY | Age: 83
End: 2023-07-11
Attending: ORTHOPAEDIC SURGERY
Payer: MEDICARE

## 2023-07-11 DIAGNOSIS — G60.9 IDIOPATHIC PERIPHERAL NEUROPATHY: Primary | ICD-10-CM

## 2023-07-11 PROCEDURE — 97162 PT EVAL MOD COMPLEX 30 MIN: CPT | Performed by: PHYSICAL THERAPIST

## 2023-07-11 PROCEDURE — 97110 THERAPEUTIC EXERCISES: CPT | Performed by: PHYSICAL THERAPIST

## 2023-07-11 NOTE — PROGRESS NOTES
LOWER EXTREMITY EVALUATION:   Referring Physician: Dr. Yu Riojas  Diagnosis: (L) Femur ORIF   Date of Onset: 5/25/2023 (Rx date) Date of Service: 7/11/2023     PATIENT SUMMARY   Tiffanie Colvin is a 81year old y/o female who presents to therapy today with complaints of intermittent pain/ache in the (L) anterior hip, groin, and knee rated 0-5/10. She is also reporting pain/ache and limited mobility of the (R) elbow. Kendra Chen lives with her  on the 3rd floor with 28 steps with (B) rails to get to their bedroom. She has a walk-in shower with grab bars and an electric recliner she likes to sit on. She also enjoys playing [employer]. History of current condition: Harini report that in 7/4/2022 she fell in her bathtub and fractured her (L) hip. She had surgery and was having physical therapy for the (L) hip. She then fractured her (R) elbow in February 2023 and had surgery for it as well. She had home health PT for about a month and is now referred now to outpatient physical therapy for evaluation and treatment. She is also diagnosed to have (B) LE neuropathy, Hx of breast CA, (B) TKR, (L) shoulder Sx, and pelvic Fx. Current functional limitation reported include inability to sit, rise up from sitting, walk, climb stairs, put on under pants, dress up, and wake up in the morning without producing or increasing symptoms in the (L) hip/groin area. Patient would like to to return to their previous level. ASSESSMENT:   Kendra Chen is presenting with an impaired (L) hip AROM, stability, strength. She has decreased balance and gait ability due to deconditioning from a fracture and a  surgical procedure. Kendra Chen would benefit from skilled Physical Therapy to address the above impairments. Precautions:  Fall Risk, (L) Hip WBAT, (R) Elbow WBAT      OBJECTIVE:   Observation: Kyphotic, slouched, forward head, protruded shoulders in sittng.      Gait: Ambulatory with a rolling walker with upright posture but jairo forward. Short steps with NBOS and decreased heel-toe and push-off pattern on the (L) LE.      ROM/MMT:  (Pre-test symptom: 0/10 (L) hip)  Hip   Flexion:  (L) Minimal loss; 3/5*;  (R) Nil loss; 4/5   Extension:  (L) Minimal loss*; 4-/5;  (R) Nil loss; 4/5    Adduction:  (L) Minimal loss; 4-/5;  (R) Nil loss; 4/5   Abduction: (L) Minimal loss*; 4/5;  (R) Nil loss; 4/5     (*) Pain at endrange and upon resistance     Patient education and instructions:   Patient education provided on range of motion exercises, safety at home with HEP, gait pattern correction, posture correction, and goal setting with patient, and HEP. Patient was instructed in and issued HEP for: Supine: (L) LE SLR 10 reps x 10 cts each, (B) LE bridging 10 reps x 10 cts each, Supine hookelying (B) hip abduction with greenTB (issued) 10 reps x 10 cts each 2-3x/day. Reviewed her home health exercises. Charges: PT Eval x1, TherEx x 1      Total Timed Treatment: 15 mins     Total Treatment Time: 48 mins      PLAN OF CARE:    Goals:  (18 visits)  1. Patient to consistently perform their HEP and it's progression to maintain their improved condition. 2. Patient to have reduced and abolished symptoms to to be able o sit, rise up from sitting, walk, climb stairs, put on under pants, dress up, and wake up in the morning without producing or increasing symptoms in the (L) hip/groin area. 3. Patient to have WNL of (L) Hip AROM in all directions with 4/5 MMT in all motions to promote all home, work, recreational, and functional activities without discomfort or deviation. Patient was advised of these findings, precautions, and treatment options and has agreed to actively participate in planning/goal setting for this course of care. Frequency / Duration: Patient will be seen for 2-1 x/week or a total of 18 visits over a 90 day period. Treatment will include: Directional preference exercises, Manual Therapy;  Therapeutic Exercises; Neuromuscular Re-education; Gait Training; Electrical Stim; Patient education; Home exercise program instruction. Education or treatment limitation: None  Rehab Potential:good    In agreement with functional assessment testing score and clinical rationale, this evaluation involved Moderate Complexity decision making due to 1-2 personal factors/comorbidities, 4+ body structures involved/activity limitations, and unstable symptoms including changing pain levels and recent surgery on the (R) elbow . LEFS Score: Initial:  31.25    Patient/Family () was advised of these findings, precautions, and treatment options and has agreed to actively participate in planning and for this course of care. Thank you for your referral. Please co-sign or sign and return this letter via fax as soon as possible to [phone]. If you have any questions, please contact me at Dept: [phone]    Sincerely,  Electronically signed by therapist: Sanjiv Craft MDT    Physician's certification required: Yes  I certify the need for these services furnished under this plan of treatment and while under my care.     X___________________________________________________ Date____________________    Certification From: 6/51/7137  To:10/9/2023  7

## 2023-07-12 ENCOUNTER — ORDER TRANSCRIPTION (OUTPATIENT)
Dept: PHYSICAL THERAPY | Facility: HOSPITAL | Age: 83
End: 2023-07-12

## 2023-07-13 ENCOUNTER — OFFICE VISIT (OUTPATIENT)
Dept: PHYSICAL THERAPY | Age: 83
End: 2023-07-13
Attending: ORTHOPAEDIC SURGERY
Payer: MEDICARE

## 2023-07-13 PROCEDURE — 97110 THERAPEUTIC EXERCISES: CPT | Performed by: PHYSICAL THERAPIST

## 2023-07-13 NOTE — PROGRESS NOTES
Date: 7/13/2023     Dx: (L) Femur ORIF          Authorized # of Visits:  25       Referring MD:  Sherley Loyola MD visit: none scheduled    Medication Changes since last visit?: No    Subjective: Agata Acosta report that she feels sore in the (L) hip. She was not able to do her HEP as regular as she would have liked too because she was tired and slept more the past day. Objective: Ambulatory with a rolling walker with good posture, wide KEKE, and equal step length but decreased heel-toe pattern. Sore (L) hip walking, no pain (R) elbow but with extension lag. Date: 7/13/2023  Tx#: 2/18 Date: Tx#: 3/ Date: Tx#: 4/ Date: Tx#: 5/ Date: Tx#: 6/ Date: Tx#: 7/ Date: Tx#: 8/   NuStep LE only L1-3-1 x 10 mins; NE    Supine: (L) SLR 2 x 10 reps x 10 cts ea; P, NW tired    Supine: (B) LE bridge 10 reps x 10 cts ea; NE tired    Supine: (B) Hip abduction with greenTB 2 x 10 reps x 10 cts ea; NE    Seated: (R) elbow extension with supination x 10 reps; P, NW     Seated: (R) elbow eccentric extension with redTB 5 reps x 5 cts ea; P, NW tired           Assessment/HEP:   Agata Acosta is slowly progressing with her supine exercises with increased sets and reps. She was encouraged to do these exercises at home since the are safer (supinelying) than standing. She will be treated more in standing during her therapy sessions. Added (R) elbow repeated extension in supination for her HEP. She felt tired and sore in the (L) hip after her session but no worse after resting. She safely ambulated with a rolling walker after her session. Issued  of her HEP. All questions and concerns were answered and addressed at this time. Goals:    (18 visits)  1. Patient to consistently perform their HEP and it's progression to maintain their improved condition.   2. Patient to have reduced and abolished symptoms to to be able o sit, rise up from sitting, walk, climb stairs, put on under pants, dress up, and wake up in the morning without producing or increasing symptoms in the (L) hip/groin area. 3. Patient to have WNL of (L) Hip AROM in all directions with 4/5 MMT in all motions to promote all home, work, recreational, and functional activities without discomfort or deviation. Plan:   Re-assess next session and continue with strengthening, stability exercises, gait training, directional preference exercise, posture correction, patient education, and HEP progression. Charges:  TherEx x 3       Total Timed Treatment: 47 mins Total Treatment Time: 48 mins

## 2023-07-19 ENCOUNTER — OFFICE VISIT (OUTPATIENT)
Dept: PHYSICAL THERAPY | Age: 83
End: 2023-07-19
Attending: ORTHOPAEDIC SURGERY
Payer: MEDICARE

## 2023-07-19 PROCEDURE — 97110 THERAPEUTIC EXERCISES: CPT | Performed by: PHYSICAL THERAPIST

## 2023-07-19 NOTE — PROGRESS NOTES
Date: 7/19/2023     Dx: (L) Femur ORIF          Authorized # of Visits:  25       Referring MD:  Delisa York MD visit: none scheduled    Medication Changes since last visit?: No    Subjective: Harini report that she is feeling tight and sore in the (L) thigh but the hip is not hurting. She state that she has been doing her HEP on the bed regularly. She feels a bit tired but ready to do her exercises. Objective: Ambulatory with a rolling walker with good posture, wide KEKE, and equal step length but decreased heel-toe pattern. Sore (L) hip walking, no pain (R) elbow but with extension lag. Date: 7/13/2023  Tx#: 2/18 Date: 7/19/2023  Tx#: 3/18 Date: Tx#: 4/ Date: Tx#: 5/ Date: Tx#: 6/ Date: Tx#: 7/ Date: Tx#: 8/   NuStep LE only L1-3-1 x 10 mins; NE    Supine: (L) SLR 2 x 10 reps x 10 cts ea; P, NW tired    Supine: (B) LE bridge 10 reps x 10 cts ea; NE tired    Supine: (B) Hip abduction with greenTB 2 x 10 reps x 10 cts ea; NE    Seated: (R) elbow extension with supination x 10 reps; P, NW     Seated: (R) elbow eccentric extension with redTB 5 reps x 5 cts ea; P, NW tired NuStep LE only L1-2-4-5 -2 x 10 mins; NE tired    Shuttle: (B) leg press 5b x x 20 reps; NE     Shuttle: (R/L) leg press 3b x 20 reps ea; NE tired    Shuttle: (R) calf raise 2b x 10 reps; NE tired     - unable to do the (L) calf raise    Ambulation with rolling walker and cues to widen KEKE and stand up straight    Standing (R/L) hip abduction with redTB 2 x 10 reps ea; NE tired    (B) UE extension greenTB 10 reps x 10 cts ea; NE tired          Assessment/HEP:   Weston Hernandez is slowly progressing with (L and R) LE strengthening exercises in standing and using a leg press machine. She reported (L) hip ache when bending the hip to do exercises and transfers but no worse as a result. She require cueing to correct knee-toe alignment and to stand up straight. She stoop down most of the time specially when ambulating.   She can correct herself when cued but reverts back to a stooped posture. She understand and agree with the correction and her HEP. All questions and concerns were answered and addressed at this time. Goals:    (18 visits)  1. Patient to consistently perform their HEP and it's progression to maintain their improved condition. 2. Patient to have reduced and abolished symptoms to to be able o sit, rise up from sitting, walk, climb stairs, put on under pants, dress up, and wake up in the morning without producing or increasing symptoms in the (L) hip/groin area. 3. Patient to have WNL of (L) Hip AROM in all directions with 4/5 MMT in all motions to promote all home, work, recreational, and functional activities without discomfort or deviation. Plan:   Re-assess next session and continue with strengthening, stability exercises, gait training, directional preference exercise, posture correction, patient education, and HEP progression. Charges:  TherEx x 3       Total Timed Treatment: 48 mins Total Treatment Time: 49 mins

## 2023-07-21 ENCOUNTER — OFFICE VISIT (OUTPATIENT)
Dept: PHYSICAL THERAPY | Age: 83
End: 2023-07-21
Attending: ORTHOPAEDIC SURGERY
Payer: MEDICARE

## 2023-07-21 PROCEDURE — 97110 THERAPEUTIC EXERCISES: CPT | Performed by: PHYSICAL THERAPIST

## 2023-07-21 NOTE — PROGRESS NOTES
Date: 7/21/2023     Dx: (L) Femur ORIF          Authorized # of Visits:  25       Referring MD:  Danielle Dominguez MD visit: none scheduled    Medication Changes since last visit?: No    Subjective: Judy Innocent report that she did not feel sore or pain in the (L) hip after her last session. She was also not that tired. She is ready to do her exercises now. Objective: Ambulatory with a rolling walker with good posture, wide KEKE, and equal step length but decreased heel-toe pattern. No pain/ache (L) hip. Date: 7/13/2023  Tx#: 2/18 Date: 7/19/2023  Tx#: 3/18 Date: 7/21/2023  Tx#: 4/18 Date: Tx#: 5/ Date: Tx#: 6/ Date: Tx#: 7/ Date: Tx#: 8/   NuStep LE only L1-3-1 x 10 mins; NE    Supine: (L) SLR 2 x 10 reps x 10 cts ea; P, NW tired    Supine: (B) LE bridge 10 reps x 10 cts ea; NE tired    Supine: (B) Hip abduction with greenTB 2 x 10 reps x 10 cts ea; NE    Seated: (R) elbow extension with supination x 10 reps; P, NW     Seated: (R) elbow eccentric extension with redTB 5 reps x 5 cts ea; P, NW tired NuStep LE only L1-2-4-5 -2 x 10 mins; NE tired    Shuttle: (B) leg press 5b x x 20 reps; NE     Shuttle: (R/L) leg press 3b x 20 reps ea; NE tired    Shuttle: (R) calf raise 2b x 10 reps; NE tired     - unable to do the (L) calf raise    Ambulation with rolling walker and cues to widen KEKE and stand up straight    Standing (R/L) hip abduction with redTB 2 x 10 reps ea; NE tired    (B) UE extension greenTB 10 reps x 10 cts ea; NE tired NuStep LE only L1-2-4-5 -2 x 10 mins; NE tired    Shuttle: (B) leg press 5b 2 x 20 reps; P, NW ((L) hip)    Shuttle: (R/L) leg press 4b 2 x 20 reps ea; NE tired    Ambulation with rolling walker and cues to widen KEKE and stand up straight    (B) UE extension greenTB 10 reps x 10 cts ea; NE tired             Assessment/HEP:   Judy Murphy reported pain/ache in the (L) hip during leg press exercise at the starting position but no worse as a result and with WFL of AROM in all directions. She was able to ambulate with  wider KEKE, equal step length, and with minimal cueing straighter posture. She is slowly progressing with (B) LE strengthening and postural stability exercises. All questions and concerns were answered and addressed at this time. Goals:    (18 visits)  1. Patient to consistently perform their HEP and it's progression to maintain their improved condition. 2. Patient to have reduced and abolished symptoms to to be able o sit, rise up from sitting, walk, climb stairs, put on under pants, dress up, and wake up in the morning without producing or increasing symptoms in the (L) hip/groin area. 3. Patient to have WNL of (L) Hip AROM in all directions with 4/5 MMT in all motions to promote all home, work, recreational, and functional activities without discomfort or deviation. Plan:   Re-assess next session and continue with strengthening, stability exercises, gait training, directional preference exercise, posture correction, patient education, and HEP progression. Charges:  TherEx x 3       Total Timed Treatment: 39 mins Total Treatment Time: 40 mins

## 2023-07-26 ENCOUNTER — APPOINTMENT (OUTPATIENT)
Dept: PHYSICAL THERAPY | Age: 83
End: 2023-07-26
Attending: ORTHOPAEDIC SURGERY
Payer: MEDICARE

## 2023-08-04 ENCOUNTER — OFFICE VISIT (OUTPATIENT)
Dept: PHYSICAL THERAPY | Age: 83
End: 2023-08-04
Attending: ORTHOPAEDIC SURGERY
Payer: MEDICARE

## 2023-08-04 PROCEDURE — 97110 THERAPEUTIC EXERCISES: CPT | Performed by: PHYSICAL THERAPIST

## 2023-08-04 NOTE — PROGRESS NOTES
Date: 8/4/2023     Dx: (L) Femur ORIF          Authorized # of Visits:  25       Referring MD:  Mayra Griffin MD visit: none scheduled    Medication Changes since last visit?: No    Subjective: Becki Peabody report that her (L) hip is not bothering her at this time although she still feel the ache and tightness depending on her activity. Objective: Ambulatory with a rolling walker with good posture, wide KEKE, and equal step length but decreased heel-toe pattern. No pain/ache (L) hip. Date: 7/13/2023  Tx#: 2/18 Date: 7/19/2023  Tx#: 3/18 Date: 7/21/2023  Tx#: 4/18 Date: 8/4/2023  Tx#: 5/18 Date: Tx#: 6/ Date: Tx#: 7/ Date:    Tx#: 8/   NuStep LE only L1-3-1 x 10 mins; NE    Supine: (L) SLR 2 x 10 reps x 10 cts ea; P, NW tired    Supine: (B) LE bridge 10 reps x 10 cts ea; NE tired    Supine: (B) Hip abduction with greenTB 2 x 10 reps x 10 cts ea; NE    Seated: (R) elbow extension with supination x 10 reps; P, NW     Seated: (R) elbow eccentric extension with redTB 5 reps x 5 cts ea; P, NW tired NuStep LE only L1-2-4-5 -2 x 10 mins; NE tired    Shuttle: (B) leg press 5b x x 20 reps; NE     Shuttle: (R/L) leg press 3b x 20 reps ea; NE tired    Shuttle: (R) calf raise 2b x 10 reps; NE tired     - unable to do the (L) calf raise    Ambulation with rolling walker and cues to widen KEKE and stand up straight    Standing (R/L) hip abduction with redTB 2 x 10 reps ea; NE tired    (B) UE extension greenTB 10 reps x 10 cts ea; NE tired NuStep LE only L1-2-4-5 -2 x 10 mins; NE tired    Shuttle: (B) leg press 5b 2 x 20 reps; P, NW ((L) hip)    Shuttle: (R/L) leg press 4b 2 x 20 reps ea; NE tired    Ambulation with rolling walker and cues to widen KEKE and stand up straight    (B) UE extension greenTB 10 reps x 10 cts ea; NE tired     NuStep LE only H6-1-7-5-6-7-2 x 10 mins; NE tired    Shuttle: (B) leg press 6b 2 x 20 reps; P, NW ((L) hip)    Shuttle: (R/L) leg press 5b 2 x 20 reps ea; NE tired    Shuttle: (B) heel raises 3b x 10 reps; NE tired    Monster walk fwd/bkwd with redTB and Denise-ModA 2 laps x 10 feet; NE (patient nervous)    PERRI over rail x 10 reps; NE              Assessment/HEP:   Bashir Reynolds continue to report (L) hip ache/pain during leg press exercise at the starting position but no worse as a result. She was able to perform monster walk with a redTB around the ankles with Mod-Denise ambulating forward and backward and cueing to relax due to her fear of falling backward. She felt no pain and felt good that she was able to do her exercises today. All questions and concerns were answered and addressed at this time. Goals:    (18 visits)  1. Patient to consistently perform their HEP and it's progression to maintain their improved condition. 2. Patient to have reduced and abolished symptoms to to be able o sit, rise up from sitting, walk, climb stairs, put on under pants, dress up, and wake up in the morning without producing or increasing symptoms in the (L) hip/groin area. 3. Patient to have WNL of (L) Hip AROM in all directions with 4/5 MMT in all motions to promote all home, work, recreational, and functional activities without discomfort or deviation. Plan:   Re-assess next session and continue with strengthening, stability exercises, gait training, directional preference exercise, posture correction, patient education, and HEP progression. Charges:  TherEx x 3       Total Timed Treatment: 45 mins Total Treatment Time: 47 mins

## 2023-08-09 ENCOUNTER — TELEPHONE (OUTPATIENT)
Dept: PHYSICAL THERAPY | Facility: HOSPITAL | Age: 83
End: 2023-08-09

## 2023-08-09 ENCOUNTER — APPOINTMENT (OUTPATIENT)
Dept: PHYSICAL THERAPY | Age: 83
End: 2023-08-09
Attending: ORTHOPAEDIC SURGERY
Payer: MEDICARE

## 2023-08-11 ENCOUNTER — TELEPHONE (OUTPATIENT)
Dept: PHYSICAL THERAPY | Facility: HOSPITAL | Age: 83
End: 2023-08-11

## 2023-08-11 ENCOUNTER — APPOINTMENT (OUTPATIENT)
Dept: PHYSICAL THERAPY | Age: 83
End: 2023-08-11
Attending: ORTHOPAEDIC SURGERY
Payer: MEDICARE

## 2023-08-16 ENCOUNTER — OFFICE VISIT (OUTPATIENT)
Dept: PHYSICAL THERAPY | Age: 83
End: 2023-08-16
Attending: ORTHOPAEDIC SURGERY
Payer: MEDICARE

## 2023-08-16 PROCEDURE — 97110 THERAPEUTIC EXERCISES: CPT | Performed by: PHYSICAL THERAPIST

## 2023-08-16 NOTE — PROGRESS NOTES
Date: 8/16/2023     Dx: (L) Femur ORIF          Authorized # of Visits:  25       Referring MD:  Rosy Rowley MD visit: none scheduled    Medication Changes since last visit?: No    Subjective: Harini report that her (L) hip is feeling better. It is not as painful as before and she is doing her exercise safely at home. Objective: Ambulatory with a rolling walker with good posture, wide KEKE, and equal step length but decreased heel-toe pattern. No pain/ache (L) hip. Date: 7/13/2023  Tx#: 2/18 Date: 7/19/2023  Tx#: 3/18 Date: 7/21/2023  Tx#: 4/18 Date: 8/4/2023  Tx#: 5/18 Date: 8/16/2023  Tx#: 6/18 Date:    Tx#: 7/   NuStep LE only L1-3-1 x 10 mins; NE    Supine: (L) SLR 2 x 10 reps x 10 cts ea; P, NW tired    Supine: (B) LE bridge 10 reps x 10 cts ea; NE tired    Supine: (B) Hip abduction with greenTB 2 x 10 reps x 10 cts ea; NE    Seated: (R) elbow extension with supination x 10 reps; P, NW     Seated: (R) elbow eccentric extension with redTB 5 reps x 5 cts ea; P, NW tired NuStep LE only L1-2-4-5 -2 x 10 mins; NE tired    Shuttle: (B) leg press 5b x x 20 reps; NE     Shuttle: (R/L) leg press 3b x 20 reps ea; NE tired    Shuttle: (R) calf raise 2b x 10 reps; NE tired     - unable to do the (L) calf raise    Ambulation with rolling walker and cues to widen KEKE and stand up straight    Standing (R/L) hip abduction with redTB 2 x 10 reps ea; NE tired    (B) UE extension greenTB 10 reps x 10 cts ea; NE tired NuStep LE only L1-2-4-5 -2 x 10 mins; NE tired    Shuttle: (B) leg press 5b 2 x 20 reps; P, NW ((L) hip)    Shuttle: (R/L) leg press 4b 2 x 20 reps ea; NE tired    Ambulation with rolling walker and cues to widen KEKE and stand up straight    (B) UE extension greenTB 10 reps x 10 cts ea; NE tired     NuStep LE only O1-6-1-5-6-7-2 x 10 mins; NE tired    Shuttle: (B) leg press 6b 2 x 20 reps; P, NW ((L) hip)    Shuttle: (R/L) leg press 5b 2 x 20 reps ea; NE tired    Shuttle: (B) heel raises 3b x 10 reps; NE tired    Monster walk fwd/bkwd with redTB and Denise-ModA 2 laps x 10 feet; NE (patient nervous)    PERRI over rail x 10 reps; NE       NuStep LE only A4-1-7-6-7-8-2 x 10 mins; NE tired    Shuttle: (B) leg press 7b 2 x 20 reps; P, NW (ache (L) groin) tired    Shuttle: (R/L) leg press 5b 2 x 20 reps ea; NE tired    Shuttle: (B) heel raises 3b x 10 reps; NE tired    Forward and backward  walk fwd/bkwd with  Denise-ModA 3 laps x 10-5 feet; NE (patient nervous)          Assessment/HEP:   Harini continue to progress with (L) LE strengthening/stability exercises with an ache in the (L) groin area during leg press exercise but no worse as  result. She has the ability (ROM and strength) to ambulate forward and sideways (R/L sides) without LOB or feeling nervous. When she ambulate backward she stiffens up and gets nervous of falling. Cueing to relax and to shift body weight to the opposite leg while making a step backward provides relief to her nervousness. She was able to ambulate more safe on the 3rd attempt. She understand and agrees with the treatment plan. All questions and concerns were answered and addressed at this time. Goals:    (18 visits)  1. Patient to consistently perform their HEP and it's progression to maintain their improved condition. 2. Patient to have reduced and abolished symptoms to to be able o sit, rise up from sitting, walk, climb stairs, put on under pants, dress up, and wake up in the morning without producing or increasing symptoms in the (L) hip/groin area. 3. Patient to have WNL of (L) Hip AROM in all directions with 4/5 MMT in all motions to promote all home, work, recreational, and functional activities without discomfort or deviation. Plan:   Re-assess next session and continue with strengthening, stability exercises, gait training, directional preference exercise, posture correction, patient education, and HEP progression. Charges:  TherEx x 3       Total Timed Treatment: 48 mins Total Treatment Time: 49 mins

## 2023-08-18 ENCOUNTER — OFFICE VISIT (OUTPATIENT)
Dept: PHYSICAL THERAPY | Age: 83
End: 2023-08-18
Attending: ORTHOPAEDIC SURGERY
Payer: MEDICARE

## 2023-08-18 PROCEDURE — 97110 THERAPEUTIC EXERCISES: CPT | Performed by: PHYSICAL THERAPIST

## 2023-08-18 NOTE — PROGRESS NOTES
Date: 8/18/2023     Dx: (L) Femur ORIF          Authorized # of Visits:  25       Referring MD:  Jeny Swift MD visit: none scheduled    Medication Changes since last visit?: No    Subjective: Harini report no pain in the (L) hip walking but ache when she lift up the leg onto her bed and getting in/out of the car. Objective: Ambulatory with a rolling walker with stooped posture, wide KEKE, and equal step length but decreased heel-toe pattern. No pain/ache (L) hip.      Date: 7/13/2023  Tx#: 2/18 Date: 7/19/2023  Tx#: 3/18 Date: 7/21/2023  Tx#: 4/18 Date: 8/4/2023  Tx#: 5/18 Date: 8/16/2023  Tx#: 6/18 Date: 8/18/2023  Tx#: 7/18   NuStep LE only L1-3-1 x 10 mins; NE    Supine: (L) SLR 2 x 10 reps x 10 cts ea; P, NW tired    Supine: (B) LE bridge 10 reps x 10 cts ea; NE tired    Supine: (B) Hip abduction with greenTB 2 x 10 reps x 10 cts ea; NE    Seated: (R) elbow extension with supination x 10 reps; P, NW     Seated: (R) elbow eccentric extension with redTB 5 reps x 5 cts ea; P, NW tired NuStep LE only L1-2-4-5 -2 x 10 mins; NE tired    Shuttle: (B) leg press 5b x x 20 reps; NE     Shuttle: (R/L) leg press 3b x 20 reps ea; NE tired    Shuttle: (R) calf raise 2b x 10 reps; NE tired     - unable to do the (L) calf raise    Ambulation with rolling walker and cues to widen KEKE and stand up straight    Standing (R/L) hip abduction with redTB 2 x 10 reps ea; NE tired    (B) UE extension greenTB 10 reps x 10 cts ea; NE tired NuStep LE only L1-2-4-5 -2 x 10 mins; NE tired    Shuttle: (B) leg press 5b 2 x 20 reps; P, NW ((L) hip)    Shuttle: (R/L) leg press 4b 2 x 20 reps ea; NE tired    Ambulation with rolling walker and cues to widen KEKE and stand up straight    (B) UE extension greenTB 10 reps x 10 cts ea; NE tired     NuStep LE only C2-7-3-5-6-7-2 x 10 mins; NE tired    Shuttle: (B) leg press 6b 2 x 20 reps; P, NW ((L) hip)    Shuttle: (R/L) leg press 5b 2 x 20 reps ea; NE tired    Shuttle: (B) heel raises 3b x 10 reps; NE tired    Monster walk fwd/bkwd with redTB and Denise-ModA 2 laps x 10 feet; NE (patient nervous)    PERRI over rail x 10 reps; NE       NuStep LE only Y1-5-1-6-7-8-2 x 10 mins; NE tired    Shuttle: (B) leg press 7b 2 x 20 reps; P, NW (ache (L) groin) tired    Shuttle: (R/L) leg press 5b 2 x 20 reps ea; NE tired    Shuttle: (B) heel raises 3b x 10 reps; NE tired    Forward and backward  walk fwd/bkwd with  Denise-ModA 3 laps x 10-5 feet; NE (patient nervous)     NuStep LE only U8-1-9-6-7-8-10-2 x 10 mins; NE tired    Shuttle: (B) leg press 7b 2 x 20 reps; P, NW (ache (L) groin) tired    Shuttle: (R/L) leg press 5b 2 x 20 reps ea; NE tired    Shuttle: (B) heel raises 3b 2 x 10 reps; NE tired    (B) UE extension greenTB 2 x 5 reps x 5-10 cts ea; NE            Assessment/HEP:   Harini continue to progress with (L) LE strengthening/stability exercises with an ache in the (L) groin area during leg press exercise but no worse as  result. She was reviewed to use middle trapezius to promote good posture while standing. She wanted to raise the handle of her rolling walker to help her correct her posture but was advised to use the upper back muscle to correct her posture. She understood and agreed with the treatment plan. No pain/ache in the (L) hip after her session. Goals:    (18 visits)  1. Patient to consistently perform their HEP and it's progression to maintain their improved condition. 2. Patient to have reduced and abolished symptoms to to be able o sit, rise up from sitting, walk, climb stairs, put on under pants, dress up, and wake up in the morning without producing or increasing symptoms in the (L) hip/groin area. 3. Patient to have WNL of (L) Hip AROM in all directions with 4/5 MMT in all motions to promote all home, work, recreational, and functional activities without discomfort or deviation.      Plan:   Re-assess next session and continue with strengthening, stability exercises, gait training, directional preference exercise, posture correction, patient education, and HEP progression. Charges:  TherEx x 3       Total Timed Treatment: 47 mins Total Treatment Time: 49 mins

## 2023-08-22 ENCOUNTER — OFFICE VISIT (OUTPATIENT)
Dept: PHYSICAL THERAPY | Age: 83
End: 2023-08-22
Attending: ORTHOPAEDIC SURGERY
Payer: MEDICARE

## 2023-08-22 PROCEDURE — 97110 THERAPEUTIC EXERCISES: CPT | Performed by: PHYSICAL THERAPIST

## 2023-08-22 NOTE — PROGRESS NOTES
Date: 8/22/2023     Dx: (L) Femur ORIF          Authorized # of Visits:  25       Referring MD:  Lui Garrison MD visit: none scheduled    Medication Changes since last visit?: No    Subjective: Harini report no pain in the (L) hip walking with a rolling walker into the department. She said that she is not in top shape today because she is bleeding in her rectum. She said that her  Rayna White) will be touching base with her MD about the bleeding. She feels fine and can do her exercise. She does not feel dizzy, faint, or nauseas. Objective: Ambulatory with a rolling walker with stooped posture, wide KEKE, and equal step length but decreased heel-toe pattern. No pain/ache (L) hip.      Date: 7/13/2023  Tx#: 2/18 Date: 7/19/2023  Tx#: 3/18 Date: 7/21/2023  Tx#: 4/18 Date: 8/4/2023  Tx#: 5/18 Date: 8/16/2023  Tx#: 6/18 Date: 8/18/2023  Tx#: 7/18   NuStep LE only L1-3-1 x 10 mins; NE    Supine: (L) SLR 2 x 10 reps x 10 cts ea; P, NW tired    Supine: (B) LE bridge 10 reps x 10 cts ea; NE tired    Supine: (B) Hip abduction with greenTB 2 x 10 reps x 10 cts ea; NE    Seated: (R) elbow extension with supination x 10 reps; P, NW     Seated: (R) elbow eccentric extension with redTB 5 reps x 5 cts ea; P, NW tired NuStep LE only L1-2-4-5 -2 x 10 mins; NE tired    Shuttle: (B) leg press 5b x x 20 reps; NE     Shuttle: (R/L) leg press 3b x 20 reps ea; NE tired    Shuttle: (R) calf raise 2b x 10 reps; NE tired     - unable to do the (L) calf raise    Ambulation with rolling walker and cues to widen KEKE and stand up straight    Standing (R/L) hip abduction with redTB 2 x 10 reps ea; NE tired    (B) UE extension greenTB 10 reps x 10 cts ea; NE tired NuStep LE only L1-2-4-5 -2 x 10 mins; NE tired    Shuttle: (B) leg press 5b 2 x 20 reps; P, NW ((L) hip)    Shuttle: (R/L) leg press 4b 2 x 20 reps ea; NE tired    Ambulation with rolling walker and cues to widen KEKE and stand up straight    (B) UE extension greenTB 10 reps x 10 cts ea; NE tired     NuStep LE only C8-8-2-5-6-7-2 x 10 mins; NE tired    Shuttle: (B) leg press 6b 2 x 20 reps; P, NW ((L) hip)    Shuttle: (R/L) leg press 5b 2 x 20 reps ea; NE tired    Shuttle: (B) heel raises 3b x 10 reps; NE tired    Monster walk fwd/bkwd with redTB and Denise-ModA 2 laps x 10 feet; NE (patient nervous)    PERRI over rail x 10 reps; NE       NuStep LE only S4-0-4-6-7-8-2 x 10 mins; NE tired    Shuttle: (B) leg press 7b 2 x 20 reps; P, NW (ache (L) groin) tired    Shuttle: (R/L) leg press 5b 2 x 20 reps ea; NE tired    Shuttle: (B) heel raises 3b x 10 reps; NE tired    Forward and backward  walk fwd/bkwd with  Denise-ModA 3 laps x 10-5 feet; NE (patient nervous)     NuStep LE only I4-8-7-6-7-8-10-2 x 10 mins; NE tired    Shuttle: (B) leg press 7b 2 x 20 reps; P, NW (ache (L) groin) tired    Shuttle: (R/L) leg press 5b 2 x 20 reps ea; NE tired    Shuttle: (B) heel raises 3b 2 x 10 reps; NE tired    (B) UE extension greenTB 2 x 5 reps x 5-10 cts ea; NE            Date: 8/22/2023  Tx#: 8/18        NuStep LE only C7-8-3-6-7-8-10-2 x 10 mins; NE tired    Shuttle: (B) leg press 7b 2 x 20 reps; P, NW (ache (L) groin) tired    Shuttle: (R/L) leg press 5b x 20 reps ea; NE tired    Shuttle: (B) heel raises 3b x 10 reps; NE tired    (B) UE extension greenTB x 10 reps x 5-10 cts ea; NE           Assessment/HEP:   Kiley Sommer was able to do her exercise with cueing for safety and correct form. She was tired after every exercise but recovered after resting. No pain in the (L) hip after her session. Her  state that she will see her MD tomorrow for the bleeding rectum. She still require a rolling walker to ambulate safely. She has WFL of (L) hip AROM in all directions with 3+/5 to 4-/5 MMT in all motions. All questions and concerns were answered at this time. Goals:    (18 visits)  1. Patient to consistently perform their HEP and it's progression to maintain their improved condition.   2. Patient to have reduced and abolished symptoms to to be able o sit, rise up from sitting, walk, climb stairs, put on under pants, dress up, and wake up in the morning without producing or increasing symptoms in the (L) hip/groin area. 3. Patient to have WNL of (L) Hip AROM in all directions with 4/5 MMT in all motions to promote all home, work, recreational, and functional activities without discomfort or deviation. Plan:   Re-assess next session and continue with strengthening, stability exercises, gait training, directional preference exercise, posture correction, patient education, and HEP progression. Charges:  TherEx x 3       Total Timed Treatment: 44 mins Total Treatment Time: 48 mins

## 2023-08-24 ENCOUNTER — TELEPHONE (OUTPATIENT)
Dept: PHYSICAL THERAPY | Facility: HOSPITAL | Age: 83
End: 2023-08-24

## 2023-08-25 ENCOUNTER — APPOINTMENT (OUTPATIENT)
Dept: PHYSICAL THERAPY | Age: 83
End: 2023-08-25
Attending: ORTHOPAEDIC SURGERY
Payer: MEDICARE

## 2023-08-30 ENCOUNTER — APPOINTMENT (OUTPATIENT)
Dept: PHYSICAL THERAPY | Age: 83
End: 2023-08-30
Attending: ORTHOPAEDIC SURGERY
Payer: MEDICARE

## 2023-09-01 ENCOUNTER — APPOINTMENT (OUTPATIENT)
Dept: PHYSICAL THERAPY | Age: 83
End: 2023-09-01
Attending: ORTHOPAEDIC SURGERY
Payer: MEDICARE

## 2023-09-06 ENCOUNTER — APPOINTMENT (OUTPATIENT)
Dept: PHYSICAL THERAPY | Age: 83
End: 2023-09-06
Attending: ORTHOPAEDIC SURGERY
Payer: MEDICARE

## 2023-09-08 ENCOUNTER — APPOINTMENT (OUTPATIENT)
Dept: PHYSICAL THERAPY | Age: 83
End: 2023-09-08
Attending: ORTHOPAEDIC SURGERY
Payer: MEDICARE

## 2023-09-15 ENCOUNTER — OFFICE VISIT (OUTPATIENT)
Dept: PHYSICAL THERAPY | Age: 83
End: 2023-09-15
Attending: ORTHOPAEDIC SURGERY
Payer: MEDICARE

## 2023-09-15 PROCEDURE — 97110 THERAPEUTIC EXERCISES: CPT | Performed by: PHYSICAL THERAPIST

## 2023-09-19 ENCOUNTER — OFFICE VISIT (OUTPATIENT)
Dept: PHYSICAL THERAPY | Age: 83
End: 2023-09-19
Attending: ORTHOPAEDIC SURGERY
Payer: MEDICARE

## 2023-09-19 PROCEDURE — 97110 THERAPEUTIC EXERCISES: CPT | Performed by: PHYSICAL THERAPIST

## 2023-09-19 NOTE — PROGRESS NOTES
Date: 9/19/2023     Dx: (L) Femur ORIF          Authorized # of Visits:  25       Referring MD:  Patricia Sutherland MD visit: none scheduled    Medication Changes since last visit?: No    Subjective: Corrinne Sherry report that she does not have any pain/ache in the (L) hip/groin. She state that she fell again the other day at home because she felt unstable. She landed on her (R) hip/buttock but did not hurt herself. She was helped up by her granddaughter. She is walking with a rolling walker at this time. Objective: Ambulatory with a rolling walker with straighter posture, wide KEKE, and equal step length but decreased heel-toe pattern. No pain/ache (L) hip.      Date: 7/13/2023  Tx#: 2/18 Date: 7/19/2023  Tx#: 3/18 Date: 7/21/2023  Tx#: 4/18 Date: 8/4/2023  Tx#: 5/18 Date: 8/16/2023  Tx#: 6/18 Date: 8/18/2023  Tx#: 7/18   NuStep LE only L1-3-1 x 10 mins; NE    Supine: (L) SLR 2 x 10 reps x 10 cts ea; P, NW tired    Supine: (B) LE bridge 10 reps x 10 cts ea; NE tired    Supine: (B) Hip abduction with greenTB 2 x 10 reps x 10 cts ea; NE    Seated: (R) elbow extension with supination x 10 reps; P, NW     Seated: (R) elbow eccentric extension with redTB 5 reps x 5 cts ea; P, NW tired NuStep LE only L1-2-4-5 -2 x 10 mins; NE tired    Shuttle: (B) leg press 5b x x 20 reps; NE     Shuttle: (R/L) leg press 3b x 20 reps ea; NE tired    Shuttle: (R) calf raise 2b x 10 reps; NE tired     - unable to do the (L) calf raise    Ambulation with rolling walker and cues to widen KEKE and stand up straight    Standing (R/L) hip abduction with redTB 2 x 10 reps ea; NE tired    (B) UE extension greenTB 10 reps x 10 cts ea; NE tired NuStep LE only L1-2-4-5 -2 x 10 mins; NE tired    Shuttle: (B) leg press 5b 2 x 20 reps; P, NW ((L) hip)    Shuttle: (R/L) leg press 4b 2 x 20 reps ea; NE tired    Ambulation with rolling walker and cues to widen KEKE and stand up straight    (B) UE extension greenTB 10 reps x 10 cts ea; NE tired     NuStep LE only K7-8-4-5-6-7-2 x 10 mins; NE tired    Shuttle: (B) leg press 6b 2 x 20 reps; P, NW ((L) hip)    Shuttle: (R/L) leg press 5b 2 x 20 reps ea; NE tired    Shuttle: (B) heel raises 3b x 10 reps; NE tired    Monster walk fwd/bkwd with redTB and Denise-ModA 2 laps x 10 feet; NE (patient nervous)    PERRI over rail x 10 reps; NE       NuStep LE only K0-3-2-6-7-8-2 x 10 mins; NE tired    Shuttle: (B) leg press 7b 2 x 20 reps; P, NW (ache (L) groin) tired    Shuttle: (R/L) leg press 5b 2 x 20 reps ea; NE tired    Shuttle: (B) heel raises 3b x 10 reps; NE tired    Forward and backward  walk fwd/bkwd with  Denise-ModA 3 laps x 10-5 feet; NE (patient nervous)     NuStep LE only D5-5-6-6-7-8-10-2 x 10 mins; NE tired    Shuttle: (B) leg press 7b 2 x 20 reps; P, NW (ache (L) groin) tired    Shuttle: (R/L) leg press 5b 2 x 20 reps ea; NE tired    Shuttle: (B) heel raises 3b 2 x 10 reps; NE tired    (B) UE extension greenTB 2 x 5 reps x 5-10 cts ea; NE            Date: 8/22/2023  Tx#: 8/18 Date: 9/15/2023  Tx#: 9/18 Date: 9/19/2023  Tx#: 10/18      NuStep LE only T4-4-8-6-7-8-10-2 x 10 mins; NE tired    Shuttle: (B) leg press 7b 2 x 20 reps; P, NW (ache (L) groin) tired    Shuttle: (R/L) leg press 5b x 20 reps ea; NE tired    Shuttle: (B) heel raises 3b x 10 reps; NE tired    (B) UE extension greenTB x 10 reps x 5-10 cts ea; NE  NuStep LE only J2-2-9-6-7-8-2 x 10 mins; NE tired    Shuttle: (B) leg press 7b 2 x 20 reps; NE    Shuttle: SL leg press 5b (L) 20 + 15 reps; (R) 13+ 10 reps; NE tired    PERRI over rail x 10 reps; P, NW mid back stretch/ache    Sitting posture correction with lumbar roll    (B) UE extension greenTB 10 reps x 10 cts ea; NE tired    Gait training fwd/bkwd with CGA 3 x 1-15 feet; NE (less nervous) NuStep LE only T9-2-9-6-7-8-2 x 10 mins; NE tired    Shuttle: (B) leg press 8b x 20+15 reps; NE    Shuttle: SL leg press 6-5b (L) 20 + 20 reps; 5b (R) 12 + 20 reps; NE tired    PERRI over rail x 10 reps; P, NW mid back stretch/ache        Assessment/HEP:   Dwight Bailey was able to progress with increased resistance with leg press machine but was tired and fatigued after every exercise and needed longer rest periods in between due to heavy breathing. Harini needed to moderate cueing to promote good posture using a RW with a wide base of support and to promote heel-toe pattern. Goals:    (18 visits)  1. Patient to consistently perform their HEP and it's progression to maintain their improved condition. 2. Patient to have reduced and abolished symptoms to to be able o sit, rise up from sitting, walk, climb stairs, put on under pants, dress up, and wake up in the morning without producing or increasing symptoms in the (L) hip/groin area. 3. Patient to have WNL of (L) Hip AROM in all directions with 4/5 MMT in all motions to promote all home, work, recreational, and functional activities without discomfort or deviation. Plan:   Re-assess next session and continue with strengthening, stability exercises, gait training, directional preference exercise, posture correction, patient education, and HEP progression. Charges:  TherEx x 2       Total Timed Treatment: 30 mins Total Treatment Time: 32 mins

## 2023-09-21 ENCOUNTER — OFFICE VISIT (OUTPATIENT)
Dept: PHYSICAL THERAPY | Age: 83
End: 2023-09-21
Attending: ORTHOPAEDIC SURGERY
Payer: MEDICARE

## 2023-09-21 PROCEDURE — 97110 THERAPEUTIC EXERCISES: CPT | Performed by: PHYSICAL THERAPIST

## 2023-09-21 NOTE — PROGRESS NOTES
Date: 9/19/2023     Dx: (L) Femur ORIF          Authorized # of Visits:  25       Referring MD:  Collin Westfall MD visit: none scheduled    Medication Changes since last visit?: No    Subjective: Joann Benson report that she does not have any pain/ache in the (L) hip/groin. She state that she fell again the other day at home because she felt unstable. She landed on her (R) hip/buttock but did not hurt herself. She was helped up by her granddaughter. She is walking with a rolling walker at this time. Objective: Ambulatory with a rolling walker with straighter posture, wide KEKE, and equal step length but decreased heel-toe pattern. No pain/ache (L) hip.      Date: 7/13/2023  Tx#: 2/18 Date: 7/19/2023  Tx#: 3/18 Date: 7/21/2023  Tx#: 4/18 Date: 8/4/2023  Tx#: 5/18 Date: 8/16/2023  Tx#: 6/18 Date: 8/18/2023  Tx#: 7/18   NuStep LE only L1-3-1 x 10 mins; NE    Supine: (L) SLR 2 x 10 reps x 10 cts ea; P, NW tired    Supine: (B) LE bridge 10 reps x 10 cts ea; NE tired    Supine: (B) Hip abduction with greenTB 2 x 10 reps x 10 cts ea; NE    Seated: (R) elbow extension with supination x 10 reps; P, NW     Seated: (R) elbow eccentric extension with redTB 5 reps x 5 cts ea; P, NW tired NuStep LE only L1-2-4-5 -2 x 10 mins; NE tired    Shuttle: (B) leg press 5b x x 20 reps; NE     Shuttle: (R/L) leg press 3b x 20 reps ea; NE tired    Shuttle: (R) calf raise 2b x 10 reps; NE tired     - unable to do the (L) calf raise    Ambulation with rolling walker and cues to widen KEKE and stand up straight    Standing (R/L) hip abduction with redTB 2 x 10 reps ea; NE tired    (B) UE extension greenTB 10 reps x 10 cts ea; NE tired NuStep LE only L1-2-4-5 -2 x 10 mins; NE tired    Shuttle: (B) leg press 5b 2 x 20 reps; P, NW ((L) hip)    Shuttle: (R/L) leg press 4b 2 x 20 reps ea; NE tired    Ambulation with rolling walker and cues to widen KEKE and stand up straight    (B) UE extension greenTB 10 reps x 10 cts ea; NE tired     NuStep LE only Q1-7-5-5-6-7-2 x 10 mins; NE tired    Shuttle: (B) leg press 6b 2 x 20 reps; P, NW ((L) hip)    Shuttle: (R/L) leg press 5b 2 x 20 reps ea; NE tired    Shuttle: (B) heel raises 3b x 10 reps; NE tired    Monster walk fwd/bkwd with redTB and Denise-ModA 2 laps x 10 feet; NE (patient nervous)    PERRI over rail x 10 reps; NE       NuStep LE only H5-2-3-6-7-8-2 x 10 mins; NE tired    Shuttle: (B) leg press 7b 2 x 20 reps; P, NW (ache (L) groin) tired    Shuttle: (R/L) leg press 5b 2 x 20 reps ea; NE tired    Shuttle: (B) heel raises 3b x 10 reps; NE tired    Forward and backward  walk fwd/bkwd with  Denise-ModA 3 laps x 10-5 feet; NE (patient nervous)     NuStep LE only O2-5-4-6-7-8-10-2 x 10 mins; NE tired    Shuttle: (B) leg press 7b 2 x 20 reps; P, NW (ache (L) groin) tired    Shuttle: (R/L) leg press 5b 2 x 20 reps ea; NE tired    Shuttle: (B) heel raises 3b 2 x 10 reps; NE tired    (B) UE extension greenTB 2 x 5 reps x 5-10 cts ea; NE            Date: 8/22/2023  Tx#: 8/18 Date: 9/15/2023  Tx#: 9/18 Date: 9/19/2023  Tx#: 10/18      NuStep LE only V1-7-4-6-7-8-10-2 x 10 mins; NE tired    Shuttle: (B) leg press 7b 2 x 20 reps; P, NW (ache (L) groin) tired    Shuttle: (R/L) leg press 5b x 20 reps ea; NE tired    Shuttle: (B) heel raises 3b x 10 reps; NE tired    (B) UE extension greenTB x 10 reps x 5-10 cts ea; NE  NuStep LE only M4-5-4-6-7-8-2 x 10 mins; NE tired    Shuttle: (B) leg press 7b 2 x 20 reps; NE    Shuttle: SL leg press 5b (L) 20 + 15 reps; (R) 13+ 10 reps; NE tired    PERRI over rail x 10 reps; P, NW mid back stretch/ache    Sitting posture correction with lumbar roll    (B) UE extension greenTB 10 reps x 10 cts ea; NE tired    Gait training fwd/bkwd with CGA 3 x 1-15 feet; NE (less nervous) NuStep LE only Z8-8-8-6-7-8-2 x 10 mins; NE tired    Shuttle: (B) leg press 8b 2 x 20 reps; NE    Shuttle: SL leg press 5b (L) 15 + 20 reps; 5b (R) 2 x 15 reps; NE tired    PERRI over rail x 10 reps; P, NW mid back stretch/ache    Gait training fwd/bkwd with CGA 2 x 15-20 feet; NE (less nervous)    Step up forward onto a 6 inch step (L) LE lead x 10 reps; (R) LE lead x 6 reps                Assessment/HEP:   Kiley Sommer was able to ambulate forward and backward with CGA with less instability and nervousness going backward. She required long rest periods between exercise to recover and continue with her exercises. She required moderate cueing to correct standing and walking posture and alignment. All questions and concerns were answered and addressed at this time. Goals:    (18 visits)  1. Patient to consistently perform their HEP and it's progression to maintain their improved condition. 2. Patient to have reduced and abolished symptoms to to be able o sit, rise up from sitting, walk, climb stairs, put on under pants, dress up, and wake up in the morning without producing or increasing symptoms in the (L) hip/groin area. 3. Patient to have WNL of (L) Hip AROM in all directions with 4/5 MMT in all motions to promote all home, work, recreational, and functional activities without discomfort or deviation. Plan:   Re-assess next session and continue with strengthening, stability exercises, gait training, directional preference exercise, posture correction, patient education, and HEP progression. Charges:  TherEx x 3       Total Timed Treatment: 46 mins Total Treatment Time: 48 mins

## 2023-09-28 ENCOUNTER — TELEPHONE (OUTPATIENT)
Dept: PHYSICAL THERAPY | Facility: HOSPITAL | Age: 83
End: 2023-09-28

## 2023-09-29 ENCOUNTER — APPOINTMENT (OUTPATIENT)
Dept: PHYSICAL THERAPY | Age: 83
End: 2023-09-29
Attending: ORTHOPAEDIC SURGERY
Payer: MEDICARE

## 2023-09-29 ENCOUNTER — OFFICE VISIT (OUTPATIENT)
Dept: PHYSICAL THERAPY | Age: 83
End: 2023-09-29
Attending: ORTHOPAEDIC SURGERY
Payer: MEDICARE

## 2023-09-29 PROCEDURE — 97110 THERAPEUTIC EXERCISES: CPT | Performed by: PHYSICAL THERAPIST

## 2023-09-29 NOTE — PROGRESS NOTES
Date: 9/29/2023     Dx: (L) Femur ORIF          Authorized # of Visits:  25       Referring MD:  Marisela Elder MD visit: none scheduled    Medication Changes since last visit?: No    Subjective: Vilma Steinberg report that she does not have any pain/ache in the (L) hip/groin. She still have difficulty lifting the (L) leg onto a step or onto her bed, but it's getting better. She feels tired at this time but ready to do her exercises. Objective: Ambulatory with a rolling walker with straighter posture, wide KEKE, and equal step length but decreased heel-toe pattern. No pain/ache (L) hip.      Date: 7/13/2023  Tx#: 2/18 Date: 7/19/2023  Tx#: 3/18 Date: 7/21/2023  Tx#: 4/18 Date: 8/4/2023  Tx#: 5/18 Date: 8/16/2023  Tx#: 6/18 Date: 8/18/2023  Tx#: 7/18   NuStep LE only L1-3-1 x 10 mins; NE    Supine: (L) SLR 2 x 10 reps x 10 cts ea; P, NW tired    Supine: (B) LE bridge 10 reps x 10 cts ea; NE tired    Supine: (B) Hip abduction with greenTB 2 x 10 reps x 10 cts ea; NE    Seated: (R) elbow extension with supination x 10 reps; P, NW     Seated: (R) elbow eccentric extension with redTB 5 reps x 5 cts ea; P, NW tired NuStep LE only L1-2-4-5 -2 x 10 mins; NE tired    Shuttle: (B) leg press 5b x x 20 reps; NE     Shuttle: (R/L) leg press 3b x 20 reps ea; NE tired    Shuttle: (R) calf raise 2b x 10 reps; NE tired     - unable to do the (L) calf raise    Ambulation with rolling walker and cues to widen KEKE and stand up straight    Standing (R/L) hip abduction with redTB 2 x 10 reps ea; NE tired    (B) UE extension greenTB 10 reps x 10 cts ea; NE tired NuStep LE only L1-2-4-5 -2 x 10 mins; NE tired    Shuttle: (B) leg press 5b 2 x 20 reps; P, NW ((L) hip)    Shuttle: (R/L) leg press 4b 2 x 20 reps ea; NE tired    Ambulation with rolling walker and cues to widen KEKE and stand up straight    (B) UE extension greenTB 10 reps x 10 cts ea; NE tired     NuStep LE only Z5-9-7-5-6-7-2 x 10 mins; NE tired    Shuttle: (B) leg press 6b 2 x 20 reps; P, NW ((L) hip)    Shuttle: (R/L) leg press 5b 2 x 20 reps ea; NE tired    Shuttle: (B) heel raises 3b x 10 reps; NE tired    Monster walk fwd/bkwd with redTB and Denise-ModA 2 laps x 10 feet; NE (patient nervous)    PERRI over rail x 10 reps; NE       NuStep LE only Y5-5-9-6-7-8-2 x 10 mins; NE tired    Shuttle: (B) leg press 7b 2 x 20 reps; P, NW (ache (L) groin) tired    Shuttle: (R/L) leg press 5b 2 x 20 reps ea; NE tired    Shuttle: (B) heel raises 3b x 10 reps; NE tired    Forward and backward  walk fwd/bkwd with  Denise-ModA 3 laps x 10-5 feet; NE (patient nervous)     NuStep LE only Y6-9-9-6-7-8-10-2 x 10 mins; NE tired    Shuttle: (B) leg press 7b 2 x 20 reps; P, NW (ache (L) groin) tired    Shuttle: (R/L) leg press 5b 2 x 20 reps ea; NE tired    Shuttle: (B) heel raises 3b 2 x 10 reps; NE tired    (B) UE extension greenTB 2 x 5 reps x 5-10 cts ea; NE            Date: 8/22/2023  Tx#: 8/18 Date: 9/15/2023  Tx#: 9/18 Date: 9/19/2023  Tx#: 10/18 Date: 9/29/2023  Tx#: 11/18     NuStep LE only H7-5-0-6-7-8-10-2 x 10 mins; NE tired    Shuttle: (B) leg press 7b 2 x 20 reps; P, NW (ache (L) groin) tired    Shuttle: (R/L) leg press 5b x 20 reps ea; NE tired    Shuttle: (B) heel raises 3b x 10 reps; NE tired    (B) UE extension greenTB x 10 reps x 5-10 cts ea; NE  NuStep LE only T4-5-3-6-7-8-2 x 10 mins; NE tired    Shuttle: (B) leg press 7b 2 x 20 reps; NE    Shuttle: SL leg press 5b (L) 20 + 15 reps; (R) 13+ 10 reps; NE tired    PERRI over rail x 10 reps; P, NW mid back stretch/ache    Sitting posture correction with lumbar roll    (B) UE extension greenTB 10 reps x 10 cts ea; NE tired    Gait training fwd/bkwd with CGA 3 x 1-15 feet; NE (less nervous) NuStep LE only V6-6-2-6-7-8-2 x 10 mins; NE tired    Shuttle: (B) leg press 8b 2 x 20 reps; NE    Shuttle: SL leg press 5b (L) 15 + 20 reps; 5b (R) 2 x 15 reps; NE tired    PERRI over rail x 10 reps; P, NW mid back stretch/ache    Gait training fwd/bkwd with CGA 2 x 15-20 feet; NE (less nervous)    Step up forward onto a 6 inch step (L) LE lead x 10 reps; (R) LE lead x 6 reps         NuStep LE only Z7-2-1-6-7-8-2 x 10 mins; NE tired    Shuttle: (B) leg press 8b 2 x 20 reps; NE    Shuttle: SL leg press 5b (L) 20+18 reps; 5b (R) x 16+18 reps; NE tired    Parallel bars step up forward onto a 6 inch step with 3 lbs (R/L) LE x 20 reps; NE tired    PERRI over rail x 10 reps; P, NW mid back stretch/ache       Assessment/HEP:   Rickye Newman did not report any pain/ache in the (L) hip. She has WFL of AROM with 3+/5 to 4-/5 MMT in all motions. She easily fatigues but remains safe walking with SBA and CGA. She require cueing to correct alignment and posture. All questions and concerns were answered and addressed at this time. Goals:    (18 visits)  1. Patient to consistently perform their HEP and it's progression to maintain their improved condition. 2. Patient to have reduced and abolished symptoms to to be able o sit, rise up from sitting, walk, climb stairs, put on under pants, dress up, and wake up in the morning without producing or increasing symptoms in the (L) hip/groin area. 3. Patient to have WNL of (L) Hip AROM in all directions with 4/5 MMT in all motions to promote all home, work, recreational, and functional activities without discomfort or deviation. Plan:   Re-assess next session and continue with strengthening, stability exercises, gait training, directional preference exercise, posture correction, patient education, and HEP progression. Charges:  TherEx x 3       Total Timed Treatment: 48 mins Total Treatment Time: 49 mins

## 2023-10-05 ENCOUNTER — OFFICE VISIT (OUTPATIENT)
Dept: PHYSICAL THERAPY | Age: 83
End: 2023-10-05
Attending: ORTHOPAEDIC SURGERY
Payer: MEDICARE

## 2023-10-05 PROCEDURE — 97110 THERAPEUTIC EXERCISES: CPT | Performed by: PHYSICAL THERAPIST

## 2023-10-05 NOTE — PROGRESS NOTES
Date: 10/5/2023     Dx: (L) Femur ORIF          Authorized # of Visits:  25       Referring MD:  Yazan Jenkins MD visit: none scheduled    Medication Changes since last visit?: No    Subjective: Rickey Newman report that she does not have any pain/ache in the (L) hip/groin but she has difficulty lifting her (L) LE up to put her pants or onto a bed. Objective: Ambulatory with a rolling walker with straighter posture, wide KEKE, and equal step length but decreased heel-toe pattern. No pain/ache (L) hip.      Date: 7/13/2023  Tx#: 2/18 Date: 7/19/2023  Tx#: 3/18 Date: 7/21/2023  Tx#: 4/18 Date: 8/4/2023  Tx#: 5/18 Date: 8/16/2023  Tx#: 6/18 Date: 8/18/2023  Tx#: 7/18   NuStep LE only L1-3-1 x 10 mins; NE    Supine: (L) SLR 2 x 10 reps x 10 cts ea; P, NW tired    Supine: (B) LE bridge 10 reps x 10 cts ea; NE tired    Supine: (B) Hip abduction with greenTB 2 x 10 reps x 10 cts ea; NE    Seated: (R) elbow extension with supination x 10 reps; P, NW     Seated: (R) elbow eccentric extension with redTB 5 reps x 5 cts ea; P, NW tired NuStep LE only L1-2-4-5 -2 x 10 mins; NE tired    Shuttle: (B) leg press 5b x x 20 reps; NE     Shuttle: (R/L) leg press 3b x 20 reps ea; NE tired    Shuttle: (R) calf raise 2b x 10 reps; NE tired     - unable to do the (L) calf raise    Ambulation with rolling walker and cues to widen KEKE and stand up straight    Standing (R/L) hip abduction with redTB 2 x 10 reps ea; NE tired    (B) UE extension greenTB 10 reps x 10 cts ea; NE tired NuStep LE only L1-2-4-5 -2 x 10 mins; NE tired    Shuttle: (B) leg press 5b 2 x 20 reps; P, NW ((L) hip)    Shuttle: (R/L) leg press 4b 2 x 20 reps ea; NE tired    Ambulation with rolling walker and cues to widen KEKE and stand up straight    (B) UE extension greenTB 10 reps x 10 cts ea; NE tired     NuStep LE only C9-9-9-5-6-7-2 x 10 mins; NE tired    Shuttle: (B) leg press 6b 2 x 20 reps; P, NW ((L) hip)    Shuttle: (R/L) leg press 5b 2 x 20 reps ea; NE tired    Shuttle: (B) heel raises 3b x 10 reps; NE tired    Monster walk fwd/bkwd with redTB and Denise-ModA 2 laps x 10 feet; NE (patient nervous)    PERRI over rail x 10 reps; NE       NuStep LE only X8-2-7-6-7-8-2 x 10 mins; NE tired    Shuttle: (B) leg press 7b 2 x 20 reps; P, NW (ache (L) groin) tired    Shuttle: (R/L) leg press 5b 2 x 20 reps ea; NE tired    Shuttle: (B) heel raises 3b x 10 reps; NE tired    Forward and backward  walk fwd/bkwd with  Denise-ModA 3 laps x 10-5 feet; NE (patient nervous)     NuStep LE only J6-5-2-6-7-8-10-2 x 10 mins; NE tired    Shuttle: (B) leg press 7b 2 x 20 reps; P, NW (ache (L) groin) tired    Shuttle: (R/L) leg press 5b 2 x 20 reps ea; NE tired    Shuttle: (B) heel raises 3b 2 x 10 reps; NE tired    (B) UE extension greenTB 2 x 5 reps x 5-10 cts ea; NE            Date: 8/22/2023  Tx#: 8/18 Date: 9/15/2023  Tx#: 9/18 Date: 9/19/2023  Tx#: 10/18 Date: 9/29/2023  Tx#: 11/18 Date: 10/5/2023  Tx#: 12/18    NuStep LE only O0-7-2-6-7-8-10-2 x 10 mins; NE tired    Shuttle: (B) leg press 7b 2 x 20 reps; P, NW (ache (L) groin) tired    Shuttle: (R/L) leg press 5b x 20 reps ea; NE tired    Shuttle: (B) heel raises 3b x 10 reps; NE tired    (B) UE extension greenTB x 10 reps x 5-10 cts ea; NE  NuStep LE only U6-2-6-6-7-8-2 x 10 mins; NE tired    Shuttle: (B) leg press 7b 2 x 20 reps; NE    Shuttle: SL leg press 5b (L) 20 + 15 reps; (R) 13+ 10 reps; NE tired    PERRI over rail x 10 reps; P, NW mid back stretch/ache    Sitting posture correction with lumbar roll    (B) UE extension greenTB 10 reps x 10 cts ea; NE tired    Gait training fwd/bkwd with CGA 3 x 1-15 feet; NE (less nervous) NuStep LE only K0-9-4-6-7-8-2 x 10 mins; NE tired    Shuttle: (B) leg press 8b 2 x 20 reps; NE    Shuttle: SL leg press 5b (L) 15 + 20 reps; 5b (R) 2 x 15 reps; NE tired    PERRI over rail x 10 reps; P, NW mid back stretch/ache    Gait training fwd/bkwd with CGA 2 x 15-20 feet; NE (less nervous)    Step up forward onto a 6 inch step (L) LE lead x 10 reps; (R) LE lead x 6 reps         NuStep LE only M4-3-0-6-7-8-2 x 10 mins; NE tired    Shuttle: (B) leg press 8b 2 x 20 reps; NE    Shuttle: SL leg press 5b (L) 20+18 reps; 5b (R) x 16+18 reps; NE tired    Parallel bars step up forward onto a 6 inch step with 3 lbs (R/L) LE x 20 reps; NE tired    PERRI over rail x 10 reps; P, NW mid back stretch/ache NuStep LE only G0-7-8-6-7-8-2 x 10 mins; NE tired    Shuttle: (B) leg press 8b 2 x 20 reps; NE    Shuttle: SL leg press 5b (L) 2 x 20 reps; 5b (R) 2 x 20 reps; NE tired    Seated: (L) Hip flexion with blueTB abd resist 5 sets x 5 reps ea; NE    Parallel bars step up forward onto a 8 inch step with 4 lbs (R/L) LE x 10+5 reps; NE tired    PERRI over rail x 10 reps; P, NW mid back stretch/ache      Assessment/HEP:   Agata Acosta did not report any pain/ache in the (L) hip. She was reminded and reviewed sitting (L) hip flexion with theraband resistance (geenTB issued). Increased resistance and height of step during (L) LE step up exercise in parallel bars. No pain reported but she was tired/fatigued. She require moderate cueing to correct posture while exercising and to use the RW to back up onto a chair or surface she is sitting on. She understand the need to improve safety. All questions and concerns were answered and addressed at this time. Goals:    (18 visits)  1. Patient to consistently perform their HEP and it's progression to maintain their improved condition. 2. Patient to have reduced and abolished symptoms to to be able o sit, rise up from sitting, walk, climb stairs, put on under pants, dress up, and wake up in the morning without producing or increasing symptoms in the (L) hip/groin area. 3. Patient to have WNL of (L) Hip AROM in all directions with 4/5 MMT in all motions to promote all home, work, recreational, and functional activities without discomfort or deviation.      Plan:   Re-assess next session and continue with strengthening, stability exercises, gait training, directional preference exercise, posture correction, patient education, and HEP progression. Charges:  TherEx x 3       Total Timed Treatment: 47 mins Total Treatment Time: 49 mins

## 2023-10-11 ENCOUNTER — OFFICE VISIT (OUTPATIENT)
Dept: PHYSICAL THERAPY | Age: 83
End: 2023-10-11
Attending: ORTHOPAEDIC SURGERY
Payer: MEDICARE

## 2023-10-11 PROCEDURE — 97110 THERAPEUTIC EXERCISES: CPT | Performed by: PHYSICAL THERAPIST

## 2023-10-11 NOTE — PROGRESS NOTES
Date: 10/11/2023     Dx: (L) Femur ORIF          Authorized # of Visits:  25       Referring MD:  Jaquelin Riggs MD visit: none scheduled    Medication Changes since last visit?: No    Subjective: Junious Shines report that her (L) hip is not hurting. She is walking with a rolling walker but she is usually bent over that hurt her back. She feels tired all the time. She state that she try to do her seated exercises. Objective: Ambulatory with a rolling walker with straighter posture, wide KEKE, and equal step length but decreased heel-toe pattern. No pain/ache (L) hip.      Date: 7/13/2023  Tx#: 2/18 Date: 7/19/2023  Tx#: 3/18 Date: 7/21/2023  Tx#: 4/18 Date: 8/4/2023  Tx#: 5/18 Date: 8/16/2023  Tx#: 6/18 Date: 8/18/2023  Tx#: 7/18   NuStep LE only L1-3-1 x 10 mins; NE    Supine: (L) SLR 2 x 10 reps x 10 cts ea; P, NW tired    Supine: (B) LE bridge 10 reps x 10 cts ea; NE tired    Supine: (B) Hip abduction with greenTB 2 x 10 reps x 10 cts ea; NE    Seated: (R) elbow extension with supination x 10 reps; P, NW     Seated: (R) elbow eccentric extension with redTB 5 reps x 5 cts ea; P, NW tired NuStep LE only L1-2-4-5 -2 x 10 mins; NE tired    Shuttle: (B) leg press 5b x x 20 reps; NE     Shuttle: (R/L) leg press 3b x 20 reps ea; NE tired    Shuttle: (R) calf raise 2b x 10 reps; NE tired     - unable to do the (L) calf raise    Ambulation with rolling walker and cues to widen KEKE and stand up straight    Standing (R/L) hip abduction with redTB 2 x 10 reps ea; NE tired    (B) UE extension greenTB 10 reps x 10 cts ea; NE tired NuStep LE only L1-2-4-5 -2 x 10 mins; NE tired    Shuttle: (B) leg press 5b 2 x 20 reps; P, NW ((L) hip)    Shuttle: (R/L) leg press 4b 2 x 20 reps ea; NE tired    Ambulation with rolling walker and cues to widen KEKE and stand up straight    (B) UE extension greenTB 10 reps x 10 cts ea; NE tired     NuStep LE only O3-9-6-5-6-7-2 x 10 mins; NE tired    Shuttle: (B) leg press 6b 2 x 20 reps; P, NW ((L) hip)    Shuttle: (R/L) leg press 5b 2 x 20 reps ea; NE tired    Shuttle: (B) heel raises 3b x 10 reps; NE tired    Monster walk fwd/bkwd with redTB and Denise-ModA 2 laps x 10 feet; NE (patient nervous)    PERRI over rail x 10 reps; NE       NuStep LE only U0-1-0-6-7-8-2 x 10 mins; NE tired    Shuttle: (B) leg press 7b 2 x 20 reps; P, NW (ache (L) groin) tired    Shuttle: (R/L) leg press 5b 2 x 20 reps ea; NE tired    Shuttle: (B) heel raises 3b x 10 reps; NE tired    Forward and backward  walk fwd/bkwd with  Denise-ModA 3 laps x 10-5 feet; NE (patient nervous)     NuStep LE only Y7-4-5-6-7-8-10-2 x 10 mins; NE tired    Shuttle: (B) leg press 7b 2 x 20 reps; P, NW (ache (L) groin) tired    Shuttle: (R/L) leg press 5b 2 x 20 reps ea; NE tired    Shuttle: (B) heel raises 3b 2 x 10 reps; NE tired    (B) UE extension greenTB 2 x 5 reps x 5-10 cts ea; NE            Date: 8/22/2023  Tx#: 8/18 Date: 9/15/2023  Tx#: 9/18 Date: 9/19/2023  Tx#: 10/18 Date: 9/29/2023  Tx#: 11/18 Date: 10/5/2023  Tx#: 12/18 Date: 10/11/2023  Tx#: 13/18   NuStep LE only H8-9-8-6-7-8-10-2 x 10 mins; NE tired    Shuttle: (B) leg press 7b 2 x 20 reps; P, NW (ache (L) groin) tired    Shuttle: (R/L) leg press 5b x 20 reps ea; NE tired    Shuttle: (B) heel raises 3b x 10 reps; NE tired    (B) UE extension greenTB x 10 reps x 5-10 cts ea; NE  NuStep LE only C7-6-8-6-7-8-2 x 10 mins; NE tired    Shuttle: (B) leg press 7b 2 x 20 reps; NE    Shuttle: SL leg press 5b (L) 20 + 15 reps; (R) 13+ 10 reps; NE tired    PERRI over rail x 10 reps; P, NW mid back stretch/ache    Sitting posture correction with lumbar roll    (B) UE extension greenTB 10 reps x 10 cts ea; NE tired    Gait training fwd/bkwd with CGA 3 x 1-15 feet; NE (less nervous) NuStep LE only H2-8-9-6-7-8-2 x 10 mins; NE tired    Shuttle: (B) leg press 8b 2 x 20 reps; NE    Shuttle: SL leg press 5b (L) 15 + 20 reps; 5b (R) 2 x 15 reps; NE tired    PERRI over rail x 10 reps; P, NW mid back stretch/ache    Gait training fwd/bkwd with CGA 2 x 15-20 feet; NE (less nervous)    Step up forward onto a 6 inch step (L) LE lead x 10 reps; (R) LE lead x 6 reps         NuStep LE only C0-8-9-6-7-8-2 x 10 mins; NE tired    Shuttle: (B) leg press 8b 2 x 20 reps; NE    Shuttle: SL leg press 5b (L) 20+18 reps; 5b (R) x 16+18 reps; NE tired    Parallel bars step up forward onto a 6 inch step with 3 lbs (R/L) LE x 20 reps; NE tired    PERRI over rail x 10 reps; P, NW mid back stretch/ache NuStep LE only K7-5-7-6-7-8-2 x 10 mins; NE tired    Shuttle: (B) leg press 8b 2 x 20 reps; NE    Shuttle: SL leg press 5b (L) 2 x 20 reps; 5b (R) 2 x 20 reps; NE tired    Seated: (L) Hip flexion with blueTB abd resist 5 sets x 5 reps ea; NE    Parallel bars step up forward onto a 8 inch step with 4 lbs (R/L) LE x 10+5 reps; NE tired    PERRI over rail x 10 reps; P, NW mid back stretch/ache NuStep LE only C3-4-2-6-7-8-2 x 10 mins; NE tired    Shuttle: (B) leg press 8b 2 x 20 reps; NE    Shuttle: SL leg press 5b (L) 2 x 20 reps; 5b (R) 2 x 20 reps; NE tired    Seated: (L) Hip flexion with blueTB abd resist 5 sets x 5 reps ea; NE    Parallel bars step up forward onto a 8 inch step with 4 lbs (R/L) LE x 10+5 reps; NE tired    PERRI over rail x 10 reps; P, NW mid back stretch/ache     Assessment/HEP:   Timothy Portillo was able to do 2 x 20 reps of (L/R) leg press and (L) LE step up onto an 8 inch step with a 4 lbs for 10+5 reps. She was tired in the (L) leg but no pain reported during and after the exercises. She continue to require minimal cueing to correct her posture during exercises and specially walking. She has a habit of leaning forward over her rolling walker. All questions and concerns were answered at this time. Goals:    (18 visits)  1. Patient to consistently perform their HEP and it's progression to maintain their improved condition.   2. Patient to have reduced and abolished symptoms to to be able o sit, rise up from sitting, walk, climb stairs, put on under pants, dress up, and wake up in the morning without producing or increasing symptoms in the (L) hip/groin area. 3. Patient to have WNL of (L) Hip AROM in all directions with 4/5 MMT in all motions to promote all home, work, recreational, and functional activities without discomfort or deviation. Plan:   Re-assess next session and continue with strengthening, stability exercises, gait training, directional preference exercise, posture correction, patient education, and HEP progression. Charges:  TherEx x 3       Total Timed Treatment: 49 mins Total Treatment Time: 50 mins

## 2023-10-13 ENCOUNTER — OFFICE VISIT (OUTPATIENT)
Dept: PHYSICAL THERAPY | Age: 83
End: 2023-10-13
Attending: ORTHOPAEDIC SURGERY
Payer: MEDICARE

## 2023-10-13 PROCEDURE — 97110 THERAPEUTIC EXERCISES: CPT | Performed by: PHYSICAL THERAPIST

## 2023-10-13 NOTE — PROGRESS NOTES
Date: 10/13/2023     Dx: (L) Femur ORIF          Authorized # of Visits:  18       Referring MD:  Karla Schmidt MD visit: none scheduled    Medication Changes since last visit?: No    Subjective: Salome Wadsworth report that her legs were tired after last session. She did not any pain/ache in the (L) hip walking or moving the (L) leg in/out of the car. Objective: Ambulatory with a rolling walker with straighter posture, wide KEKE, and equal step length but decreased heel-toe pattern. No pain/ache (L) hip.      Date: 7/13/2023  Tx#: 2/18 Date: 7/19/2023  Tx#: 3/18 Date: 7/21/2023  Tx#: 4/18 Date: 8/4/2023  Tx#: 5/18 Date: 8/16/2023  Tx#: 6/18 Date: 8/18/2023  Tx#: 7/18   NuStep LE only L1-3-1 x 10 mins; NE    Supine: (L) SLR 2 x 10 reps x 10 cts ea; P, NW tired    Supine: (B) LE bridge 10 reps x 10 cts ea; NE tired    Supine: (B) Hip abduction with greenTB 2 x 10 reps x 10 cts ea; NE    Seated: (R) elbow extension with supination x 10 reps; P, NW     Seated: (R) elbow eccentric extension with redTB 5 reps x 5 cts ea; P, NW tired NuStep LE only L1-2-4-5 -2 x 10 mins; NE tired    Shuttle: (B) leg press 5b x x 20 reps; NE     Shuttle: (R/L) leg press 3b x 20 reps ea; NE tired    Shuttle: (R) calf raise 2b x 10 reps; NE tired     - unable to do the (L) calf raise    Ambulation with rolling walker and cues to widen KEKE and stand up straight    Standing (R/L) hip abduction with redTB 2 x 10 reps ea; NE tired    (B) UE extension greenTB 10 reps x 10 cts ea; NE tired NuStep LE only L1-2-4-5 -2 x 10 mins; NE tired    Shuttle: (B) leg press 5b 2 x 20 reps; P, NW ((L) hip)    Shuttle: (R/L) leg press 4b 2 x 20 reps ea; NE tired    Ambulation with rolling walker and cues to widen KEKE and stand up straight    (B) UE extension greenTB 10 reps x 10 cts ea; NE tired     NuStep LE only X6-4-1-5-6-7-2 x 10 mins; NE tired    Shuttle: (B) leg press 6b 2 x 20 reps; P, NW ((L) hip)    Shuttle: (R/L) leg press 5b 2 x 20 reps ea; NE tired    Shuttle: (B) heel raises 3b x 10 reps; NE tired    Monster walk fwd/bkwd with redTB and Denise-ModA 2 laps x 10 feet; NE (patient nervous)    PERRI over rail x 10 reps; NE       NuStep LE only H0-3-8-6-7-8-2 x 10 mins; NE tired    Shuttle: (B) leg press 7b 2 x 20 reps; P, NW (ache (L) groin) tired    Shuttle: (R/L) leg press 5b 2 x 20 reps ea; NE tired    Shuttle: (B) heel raises 3b x 10 reps; NE tired    Forward and backward  walk fwd/bkwd with  Denise-ModA 3 laps x 10-5 feet; NE (patient nervous)     NuStep LE only R2-8-9-6-7-8-10-2 x 10 mins; NE tired    Shuttle: (B) leg press 7b 2 x 20 reps; P, NW (ache (L) groin) tired    Shuttle: (R/L) leg press 5b 2 x 20 reps ea; NE tired    Shuttle: (B) heel raises 3b 2 x 10 reps; NE tired    (B) UE extension greenTB 2 x 5 reps x 5-10 cts ea; NE            Date: 8/22/2023  Tx#: 8/18 Date: 9/15/2023  Tx#: 9/18 Date: 9/19/2023  Tx#: 10/18 Date: 9/29/2023  Tx#: 11/18 Date: 10/5/2023  Tx#: 12/18 Date: 10/11/2023  Tx#: 13/18   NuStep LE only C6-6-7-6-7-8-10-2 x 10 mins; NE tired    Shuttle: (B) leg press 7b 2 x 20 reps; P, NW (ache (L) groin) tired    Shuttle: (R/L) leg press 5b x 20 reps ea; NE tired    Shuttle: (B) heel raises 3b x 10 reps; NE tired    (B) UE extension greenTB x 10 reps x 5-10 cts ea; NE  NuStep LE only G8-0-7-6-7-8-2 x 10 mins; NE tired    Shuttle: (B) leg press 7b 2 x 20 reps; NE    Shuttle: SL leg press 5b (L) 20 + 15 reps; (R) 13+ 10 reps; NE tired    PERRI over rail x 10 reps; P, NW mid back stretch/ache    Sitting posture correction with lumbar roll    (B) UE extension greenTB 10 reps x 10 cts ea; NE tired    Gait training fwd/bkwd with CGA 3 x 1-15 feet; NE (less nervous) NuStep LE only M5-9-5-6-7-8-2 x 10 mins; NE tired    Shuttle: (B) leg press 8b 2 x 20 reps; NE    Shuttle: SL leg press 5b (L) 15 + 20 reps; 5b (R) 2 x 15 reps; NE tired    PERRI over rail x 10 reps; P, NW mid back stretch/ache    Gait training fwd/bkwd with CGA 2 x 15-20 feet; NE (less nervous)    Step up forward onto a 6 inch step (L) LE lead x 10 reps; (R) LE lead x 6 reps         NuStep LE only J0-1-6-6-7-8-2 x 10 mins; NE tired    Shuttle: (B) leg press 8b 2 x 20 reps; NE    Shuttle: SL leg press 5b (L) 20+18 reps; 5b (R) x 16+18 reps; NE tired    Parallel bars step up forward onto a 6 inch step with 3 lbs (R/L) LE x 20 reps; NE tired    PERRI over rail x 10 reps; P, NW mid back stretch/ache NuStep LE only K0-0-5-6-7-8-2 x 10 mins; NE tired    Shuttle: (B) leg press 8b 2 x 20 reps; NE    Shuttle: SL leg press 5b (L) 2 x 20 reps; 5b (R) 2 x 20 reps; NE tired    Seated: (L) Hip flexion with blueTB abd resist 5 sets x 5 reps ea; NE    Parallel bars step up forward onto a 8 inch step with 4 lbs (R/L) LE x 10+5 reps; NE tired    PERRI over rail x 10 reps; P, NW mid back stretch/ache NuStep LE only S9-2-6-6-7-8-2 x 10 mins; NE tired    Shuttle: (B) leg press 8b 2 x 20 reps; NE    Shuttle: SL leg press 5b (L) 2 x 20 reps; 5b (R) 2 x 20 reps; NE tired    Seated: (L) Hip flexion with blueTB abd resist 5 sets x 5 reps ea; NE    Parallel bars step up forward onto a 8 inch step with 4 lbs (R/L) LE x 10+5 reps; NE tired    PERRI over rail x 10 reps; P, NW mid back stretch/ache     Date: 10/13/2023  Tx#: 4/18        NuStep LE only S7-8-1-8-9-10-11-2 x 10 mins; NE tired    Shuttle: (B) leg press 8b x 20+25 reps; NE    Shuttle: SL leg press 6b (L) x 20 reps; 5b (R) x 20 reps; NE tired    Seated: (L) Hip flexion with blackTB abd resist 10 sets x 10 reps ea; NE    PERRI over rail x 10 reps; NE    Parallel bars step up forward onto a 8 inch step with 4 lbs (R/L) LE x 10+5 reps; NE tired    PERRI over rail x 10 reps; P, NW mid back stretch/ache          Assessment/HEP:   Salome Wadsworth was able to progress with increased single leg squat resistance and increased repetition with (B) LE leg squat. She required cueing to improve posture and alignment during standing position.   All questions and concerns were answered and addressed at this time. Goals:    (18 visits)  1. Patient to consistently perform their HEP and it's progression to maintain their improved condition. 2. Patient to have reduced and abolished symptoms to to be able o sit, rise up from sitting, walk, climb stairs, put on under pants, dress up, and wake up in the morning without producing or increasing symptoms in the (L) hip/groin area. 3. Patient to have WNL of (L) Hip AROM in all directions with 4/5 MMT in all motions to promote all home, work, recreational, and functional activities without discomfort or deviation. Plan:   Re-assess next session and continue with strengthening, stability exercises, gait training, directional preference exercise, posture correction, patient education, and HEP progression. Charges:  TherEx x 3       Total Timed Treatment: 48 mins Total Treatment Time: 50 mins Respiratory distress

## 2023-10-18 ENCOUNTER — OFFICE VISIT (OUTPATIENT)
Dept: PHYSICAL THERAPY | Age: 83
End: 2023-10-18
Attending: ORTHOPAEDIC SURGERY
Payer: MEDICARE

## 2023-10-18 PROCEDURE — 97110 THERAPEUTIC EXERCISES: CPT | Performed by: PHYSICAL THERAPIST

## 2023-10-18 NOTE — PROGRESS NOTES
Date: 10/18/2023     Dx: (L) Femur ORIF          Authorized # of Visits:  18       Referring MD:  Patsy Schilder MD visit: none scheduled    Medication Changes since last visit?: No    Subjective: Kiley Sommer report that her (L) hip/leg is feeling better. She still feel a twinge every now and then but for the most part it is improving. She can lift the (L) leg more now and there is no pain/ache. Objective: Ambulatory with a rolling walker with straighter posture, wide KEKE, and equal step length but decreased heel-toe pattern. No pain/ache (L) hip.      Date: 7/13/2023  Tx#: 2/18 Date: 7/19/2023  Tx#: 3/18 Date: 7/21/2023  Tx#: 4/18 Date: 8/4/2023  Tx#: 5/18 Date: 8/16/2023  Tx#: 6/18 Date: 8/18/2023  Tx#: 7/18   NuStep LE only L1-3-1 x 10 mins; NE    Supine: (L) SLR 2 x 10 reps x 10 cts ea; P, NW tired    Supine: (B) LE bridge 10 reps x 10 cts ea; NE tired    Supine: (B) Hip abduction with greenTB 2 x 10 reps x 10 cts ea; NE    Seated: (R) elbow extension with supination x 10 reps; P, NW     Seated: (R) elbow eccentric extension with redTB 5 reps x 5 cts ea; P, NW tired NuStep LE only L1-2-4-5 -2 x 10 mins; NE tired    Shuttle: (B) leg press 5b x x 20 reps; NE     Shuttle: (R/L) leg press 3b x 20 reps ea; NE tired    Shuttle: (R) calf raise 2b x 10 reps; NE tired     - unable to do the (L) calf raise    Ambulation with rolling walker and cues to widen KEKE and stand up straight    Standing (R/L) hip abduction with redTB 2 x 10 reps ea; NE tired    (B) UE extension greenTB 10 reps x 10 cts ea; NE tired NuStep LE only L1-2-4-5 -2 x 10 mins; NE tired    Shuttle: (B) leg press 5b 2 x 20 reps; P, NW ((L) hip)    Shuttle: (R/L) leg press 4b 2 x 20 reps ea; NE tired    Ambulation with rolling walker and cues to widen KEKE and stand up straight    (B) UE extension greenTB 10 reps x 10 cts ea; NE tired     NuStep LE only I9-5-5-5-6-7-2 x 10 mins; NE tired    Shuttle: (B) leg press 6b 2 x 20 reps; P, NW ((L) hip)    Shuttle: (R/L) leg press 5b 2 x 20 reps ea; NE tired    Shuttle: (B) heel raises 3b x 10 reps; NE tired    Monster walk fwd/bkwd with redTB and Denise-ModA 2 laps x 10 feet; NE (patient nervous)    PERRI over rail x 10 reps; NE       NuStep LE only E6-4-2-6-7-8-2 x 10 mins; NE tired    Shuttle: (B) leg press 7b 2 x 20 reps; P, NW (ache (L) groin) tired    Shuttle: (R/L) leg press 5b 2 x 20 reps ea; NE tired    Shuttle: (B) heel raises 3b x 10 reps; NE tired    Forward and backward  walk fwd/bkwd with  Denise-ModA 3 laps x 10-5 feet; NE (patient nervous)     NuStep LE only Z1-1-1-6-7-8-10-2 x 10 mins; NE tired    Shuttle: (B) leg press 7b 2 x 20 reps; P, NW (ache (L) groin) tired    Shuttle: (R/L) leg press 5b 2 x 20 reps ea; NE tired    Shuttle: (B) heel raises 3b 2 x 10 reps; NE tired    (B) UE extension greenTB 2 x 5 reps x 5-10 cts ea; NE            Date: 8/22/2023  Tx#: 8/18 Date: 9/15/2023  Tx#: 9/18 Date: 9/19/2023  Tx#: 10/18 Date: 9/29/2023  Tx#: 11/18 Date: 10/5/2023  Tx#: 12/18 Date: 10/11/2023  Tx#: 13/18   NuStep LE only U5-4-9-6-7-8-10-2 x 10 mins; NE tired    Shuttle: (B) leg press 7b 2 x 20 reps; P, NW (ache (L) groin) tired    Shuttle: (R/L) leg press 5b x 20 reps ea; NE tired    Shuttle: (B) heel raises 3b x 10 reps; NE tired    (B) UE extension greenTB x 10 reps x 5-10 cts ea; NE  NuStep LE only Y4-2-4-6-7-8-2 x 10 mins; NE tired    Shuttle: (B) leg press 7b 2 x 20 reps; NE    Shuttle: SL leg press 5b (L) 20 + 15 reps; (R) 13+ 10 reps; NE tired    PERRI over rail x 10 reps; P, NW mid back stretch/ache    Sitting posture correction with lumbar roll    (B) UE extension greenTB 10 reps x 10 cts ea; NE tired    Gait training fwd/bkwd with CGA 3 x 1-15 feet; NE (less nervous) NuStep LE only H4-4-1-6-7-8-2 x 10 mins; NE tired    Shuttle: (B) leg press 8b 2 x 20 reps; NE    Shuttle: SL leg press 5b (L) 15 + 20 reps; 5b (R) 2 x 15 reps; NE tired    PERRI over rail x 10 reps; P, NW mid back stretch/ache    Gait training fwd/bkwd with CGA 2 x 15-20 feet; NE (less nervous)    Step up forward onto a 6 inch step (L) LE lead x 10 reps; (R) LE lead x 6 reps         NuStep LE only D9-9-0-6-7-8-2 x 10 mins; NE tired    Shuttle: (B) leg press 8b 2 x 20 reps; NE    Shuttle: SL leg press 5b (L) 20+18 reps; 5b (R) x 16+18 reps; NE tired    Parallel bars step up forward onto a 6 inch step with 3 lbs (R/L) LE x 20 reps; NE tired    PERRI over rail x 10 reps; P, NW mid back stretch/ache NuStep LE only M8-1-9-6-7-8-2 x 10 mins; NE tired    Shuttle: (B) leg press 8b 2 x 20 reps; NE    Shuttle: SL leg press 5b (L) 2 x 20 reps; 5b (R) 2 x 20 reps; NE tired    Seated: (L) Hip flexion with blueTB abd resist 5 sets x 5 reps ea; NE    Parallel bars step up forward onto a 8 inch step with 4 lbs (R/L) LE x 10+5 reps; NE tired    PERRI over rail x 10 reps; P, NW mid back stretch/ache NuStep LE only B7-2-6-6-7-8-2 x 10 mins; NE tired    Shuttle: (B) leg press 8b 2 x 20 reps; NE    Shuttle: SL leg press 5b (L) 2 x 20 reps; 5b (R) 2 x 20 reps; NE tired    Seated: (L) Hip flexion with blueTB abd resist 5 sets x 5 reps ea; NE    Parallel bars step up forward onto a 8 inch step with 4 lbs (R/L) LE x 10+5 reps; NE tired    EPRRI over rail x 10 reps; P, NW mid back stretch/ache     Date: 10/13/2023  Tx#: 14/18 Date: 10/18/2023  Tx#: 15/18       NuStep LE only P7-7-9-8-9-10-11-2 x 10 mins; NE tired    Shuttle: (B) leg press 8b x 20+25 reps; NE    Shuttle: SL leg press 6b (L) x 20 reps; 5b (R) x 20 reps; NE tired    Seated: (L) Hip flexion with blackTB abd resist 10 sets x 10 reps ea; NE    PERRI over rail x 10 reps; NE    Parallel bars step up forward onto a 8 inch step with 4 lbs (R/L) LE x 10+5 reps; NE tired    PERRI over rail x 10 reps; P, NW mid back stretch/ache NuStep LE only P4-9-2-8-9-10-11-2 x 10 mins; NE tired    Shuttle: (B) leg press 8b x 25+20reps; NE    Shuttle: SL leg press 6b (L) x 20 reps; 6b (R) x 15 reps; NE tired     Seated: (L) Hip flexion with blackTB abd resist 10 sets x 5 reps ea; NE    Parallel bars step up forward onto a 8 inch step with 4 lbs (R/L) LE x 10+5 reps; NE tired    PERRI over rail x 10 reps; P, NW mid back stretch/ache             Assessment/HEP:   Joann Benson is able to progress with heavier resistance/weights. She was tired after her session but no (L) hip pain/ache. She required minimal cueing to correct posture while ambulating. Goals:    (18 visits)  1. Patient to consistently perform their HEP and it's progression to maintain their improved condition. 2. Patient to have reduced and abolished symptoms to to be able o sit, rise up from sitting, walk, climb stairs, put on under pants, dress up, and wake up in the morning without producing or increasing symptoms in the (L) hip/groin area. 3. Patient to have WNL of (L) Hip AROM in all directions with 4/5 MMT in all motions to promote all home, work, recreational, and functional activities without discomfort or deviation. Plan:   Re-assess next session and continue with strengthening, stability exercises, gait training, directional preference exercise, posture correction, patient education, and HEP progression. Charges:  TherEx x 3       Total Timed Treatment: 49 mins Total Treatment Time: 50 mins

## 2023-10-20 ENCOUNTER — OFFICE VISIT (OUTPATIENT)
Dept: PHYSICAL THERAPY | Age: 83
End: 2023-10-20
Attending: ORTHOPAEDIC SURGERY
Payer: MEDICARE

## 2023-10-20 PROCEDURE — 97110 THERAPEUTIC EXERCISES: CPT | Performed by: PHYSICAL THERAPIST

## 2023-10-20 NOTE — PROGRESS NOTES
Date: 10/18/2023     Dx: (L) Femur ORIF          Authorized # of Visits:  [id number]       Referring MD:  Genet Miles MD visit: none scheduled    Medication Changes since last visit?: No    Subjective: Barb Beltran report that her (L) hip/leg is feeling better. She still feel a twinge every now and then but for the most part it is improving. She can lift the (L) leg more now and there is no pain/ache. Objective: Ambulatory with a rolling walker with straighter posture, wide KEKE, and equal step length but decreased heel-toe pattern. No pain/ache (L) hip.      Date: 7/13/2023  Tx#: 2/18 Date: 7/19/2023  Tx#: 3/18 Date: 7/21/2023  Tx#: 4/18 Date: 8/4/2023  Tx#: 5/18 Date: 8/16/2023  Tx#: 6/18 Date: 8/18/2023  Tx#: 7/18   NuStep LE only L1-3-1 x 10 mins; NE    Supine: (L) SLR 2 x 10 reps x 10 cts ea; P, NW tired    Supine: (B) LE bridge 10 reps x 10 cts ea; NE tired    Supine: (B) Hip abduction with greenTB 2 x 10 reps x 10 cts ea; NE    Seated: (R) elbow extension with supination x 10 reps; P, NW     Seated: (R) elbow eccentric extension with redTB 5 reps x 5 cts ea; P, NW tired NuStep LE only L1-2-4-5 -2 x 10 mins; NE tired    Shuttle: (B) leg press 5b x x 20 reps; NE     Shuttle: (R/L) leg press 3b x 20 reps ea; NE tired    Shuttle: (R) calf raise 2b x 10 reps; NE tired     - unable to do the (L) calf raise    Ambulation with rolling walker and cues to widen KEKE and stand up straight    Standing (R/L) hip abduction with redTB 2 x 10 reps ea; NE tired    (B) UE extension greenTB 10 reps x 10 cts ea; NE tired NuStep LE only L1-2-4-5 -2 x 10 mins; NE tired    Shuttle: (B) leg press 5b 2 x 20 reps; P, NW ((L) hip)    Shuttle: (R/L) leg press 4b 2 x 20 reps ea; NE tired    Ambulation with rolling walker and cues to widen KEKE and stand up straight    (B) UE extension greenTB 10 reps x 10 cts ea; NE tired     NuStep LE only P3-9-9-5-6-7-2 x 10 mins; NE tired    Shuttle: (B) leg press 6b 2 x 20 reps; P, NW ((L) hip)    Shuttle: (R/L) leg press 5b 2 x 20 reps ea; NE tired    Shuttle: (B) heel raises 3b x 10 reps; NE tired    Monster walk fwd/bkwd with redTB and Denise-ModA 2 laps x 10 feet; NE (patient nervous)    PERRI over rail x 10 reps; NE       NuStep LE only F5-9-3-6-7-8-2 x 10 mins; NE tired    Shuttle: (B) leg press 7b 2 x 20 reps; P, NW (ache (L) groin) tired    Shuttle: (R/L) leg press 5b 2 x 20 reps ea; NE tired    Shuttle: (B) heel raises 3b x 10 reps; NE tired    Forward and backward  walk fwd/bkwd with  Denise-ModA 3 laps x 10-5 feet; NE (patient nervous)     NuStep LE only S7-3-5-6-7-8-10-2 x 10 mins; NE tired    Shuttle: (B) leg press 7b 2 x 20 reps; P, NW (ache (L) groin) tired    Shuttle: (R/L) leg press 5b 2 x 20 reps ea; NE tired    Shuttle: (B) heel raises 3b 2 x 10 reps; NE tired    (B) UE extension greenTB 2 x 5 reps x 5-10 cts ea; NE            Date: 8/22/2023  Tx#: 8/18 Date: 9/15/2023  Tx#: 9/18 Date: 9/19/2023  Tx#: 10/18 Date: 9/29/2023  Tx#: 11/18 Date: 10/5/2023  Tx#: 12/18 Date: 10/11/2023  Tx#: 13/18   NuStep LE only O4-8-9-6-7-8-10-2 x 10 mins; NE tired    Shuttle: (B) leg press 7b 2 x 20 reps; P, NW (ache (L) groin) tired    Shuttle: (R/L) leg press 5b x 20 reps ea; NE tired    Shuttle: (B) heel raises 3b x 10 reps; NE tired    (B) UE extension greenTB x 10 reps x 5-10 cts ea; NE  NuStep LE only C3-4-8-6-7-8-2 x 10 mins; NE tired    Shuttle: (B) leg press 7b 2 x 20 reps; NE    Shuttle: SL leg press 5b (L) 20 + 15 reps; (R) 13+ 10 reps; NE tired    PERRI over rail x 10 reps; P, NW mid back stretch/ache    Sitting posture correction with lumbar roll    (B) UE extension greenTB 10 reps x 10 cts ea; NE tired    Gait training fwd/bkwd with CGA 3 x 1-15 feet; NE (less nervous) NuStep LE only E3-2-0-6-7-8-2 x 10 mins; NE tired    Shuttle: (B) leg press 8b 2 x 20 reps; NE    Shuttle: SL leg press 5b (L) 15 + 20 reps; 5b (R) 2 x 15 reps; NE tired    PERRI over rail x 10 reps; P, NW mid back stretch/ache    Gait training fwd/bkwd with CGA 2 x 15-20 feet; NE (less nervous)    Step up forward onto a 6 inch step (L) LE lead x 10 reps; (R) LE lead x 6 reps         NuStep LE only Z8-2-4-6-7-8-2 x 10 mins; NE tired    Shuttle: (B) leg press 8b 2 x 20 reps; NE    Shuttle: SL leg press 5b (L) 20+18 reps; 5b (R) x 16+18 reps; NE tired    Parallel bars step up forward onto a 6 inch step with 3 lbs (R/L) LE x 20 reps; NE tired    PERRI over rail x 10 reps; P, NW mid back stretch/ache NuStep LE only W7-7-2-6-7-8-2 x 10 mins; NE tired    Shuttle: (B) leg press 8b 2 x 20 reps; NE    Shuttle: SL leg press 5b (L) 2 x 20 reps; 5b (R) 2 x 20 reps; NE tired    Seated: (L) Hip flexion with blueTB abd resist 5 sets x 5 reps ea; NE    Parallel bars step up forward onto a 8 inch step with 4 lbs (R/L) LE x 10+5 reps; NE tired    PERRI over rail x 10 reps; P, NW mid back stretch/ache NuStep LE only X9-1-2-6-7-8-2 x 10 mins; NE tired    Shuttle: (B) leg press 8b 2 x 20 reps; NE    Shuttle: SL leg press 5b (L) 2 x 20 reps; 5b (R) 2 x 20 reps; NE tired    Seated: (L) Hip flexion with blueTB abd resist 5 sets x 5 reps ea; NE    Parallel bars step up forward onto a 8 inch step with 4 lbs (R/L) LE x 10+5 reps; NE tired    PERRI over rail x 10 reps; P, NW mid back stretch/ache     Date: 10/13/2023  Tx#: 14/18 Date: 10/18/2023  Tx#: 15/18 Date: 10/20/2023  Tx#: 16/18      NuStep LE only G5-0-7-8-9-10-11-2 x 10 mins; NE tired    Shuttle: (B) leg press 8b x 20+25 reps; NE    Shuttle: SL leg press 6b (L) x 20 reps; 5b (R) x 20 reps; NE tired    Seated: (L) Hip flexion with blackTB abd resist 10 sets x 10 reps ea; NE    PERRI over rail x 10 reps; NE    Parallel bars step up forward onto a 8 inch step with 4 lbs (R/L) LE x 10+5 reps; NE tired    PERRI over rail x 10 reps; P, NW mid back stretch/ache NuStep LE only M0-0-4-8-9-10-11-2 x 10 mins; NE tired    Shuttle: (B) leg press 8b x 25+20reps; NE    Shuttle: SL leg press 6b (L) x 20 reps; 6b (R) x 15 reps; NE tired     Seated: (L) Hip flexion with blackTB abd resist 10 sets x 5 reps ea; NE    Parallel bars step up forward onto a 8 inch step with 4 lbs (R/L) LE x 10+5 reps; NE tired    PERRI over rail x 10 reps; P, NW mid back stretch/ache     NuStep LE only P3-7-7-8-9-10-11-2 x 10 mins; NE tired    Shuttle: (B) leg press 8b x 25+20reps; NE    Shuttle: SL leg press 6b (L) x 25 reps; 6b (R) x 15 reps; NE tired     Seated: (L/R) Hip flexion with 5 lb weight 2 x 5 sets x 5 reps ea; NE tired    Parallel bars step up forward onto a 8 inch step with 5 lbs (R/L) LE lead x 10+5 reps; NE tired    PERRI over rail x 10 reps; P, NW mid back stretch/ache        Assessment/HEP:   Lala Genao is able to increase repetitions with the same resistance as last session. She needed minimal cueing today to correct posture and alignment during exercises. She did not report any pain in the (L) hip during and after her session but was tired after the session. Goals:    (18 visits)  1. Patient to consistently perform their HEP and it's progression to maintain their improved condition. 2. Patient to have reduced and abolished symptoms to to be able o sit, rise up from sitting, walk, climb stairs, put on under pants, dress up, and wake up in the morning without producing or increasing symptoms in the (L) hip/groin area. 3. Patient to have WNL of (L) Hip AROM in all directions with 4/5 MMT in all motions to promote all home, work, recreational, and functional activities without discomfort or deviation. Plan:   Re-assess next session and continue with strengthening, stability exercises, gait training, directional preference exercise, posture correction, patient education, and HEP progression. Charges:  TherEx x 4       Total Timed Treatment: 54 mins Total Treatment Time: 55 mins

## 2023-10-31 ENCOUNTER — OFFICE VISIT (OUTPATIENT)
Dept: PHYSICAL THERAPY | Age: 83
End: 2023-10-31
Attending: ORTHOPAEDIC SURGERY

## 2023-10-31 PROCEDURE — 97110 THERAPEUTIC EXERCISES: CPT | Performed by: PHYSICAL THERAPIST

## 2023-10-31 NOTE — PROGRESS NOTES
Date: 10/31/2023     Dx: (L) Femur ORIF          Authorized # of Visits:  25       Referring MD:  Izaiah Cox MD visit: none scheduled    Medication Changes since last visit?: No    Subjective: Kiley Ferguson report that her (L) hip is not hurting. This morning while she was on the last step she loss her balance and sat down slowly on the 2nd step. She did not hurt herself. She said she just lost her balance backward. Objective: Ambulatory with a rolling walker with straighter posture, wide KEKE, and equal step length but decreased heel-toe pattern. No pain/ache (L) hip.      Date: 7/13/2023  Tx#: 2/18 Date: 7/19/2023  Tx#: 3/18 Date: 7/21/2023  Tx#: 4/18 Date: 8/4/2023  Tx#: 5/18 Date: 8/16/2023  Tx#: 6/18 Date: 8/18/2023  Tx#: 7/18   NuStep LE only L1-3-1 x 10 mins; NE    Supine: (L) SLR 2 x 10 reps x 10 cts ea; P, NW tired    Supine: (B) LE bridge 10 reps x 10 cts ea; NE tired    Supine: (B) Hip abduction with greenTB 2 x 10 reps x 10 cts ea; NE    Seated: (R) elbow extension with supination x 10 reps; P, NW     Seated: (R) elbow eccentric extension with redTB 5 reps x 5 cts ea; P, NW tired NuStep LE only L1-2-4-5 -2 x 10 mins; NE tired    Shuttle: (B) leg press 5b x x 20 reps; NE     Shuttle: (R/L) leg press 3b x 20 reps ea; NE tired    Shuttle: (R) calf raise 2b x 10 reps; NE tired     - unable to do the (L) calf raise    Ambulation with rolling walker and cues to widen KEKE and stand up straight    Standing (R/L) hip abduction with redTB 2 x 10 reps ea; NE tired    (B) UE extension greenTB 10 reps x 10 cts ea; NE tired NuStep LE only L1-2-4-5 -2 x 10 mins; NE tired    Shuttle: (B) leg press 5b 2 x 20 reps; P, NW ((L) hip)    Shuttle: (R/L) leg press 4b 2 x 20 reps ea; NE tired    Ambulation with rolling walker and cues to widen KEKE and stand up straight    (B) UE extension greenTB 10 reps x 10 cts ea; NE tired     NuStep LE only C6-7-7-5-6-7-2 x 10 mins; NE tired    Shuttle: (B) leg press 6b 2 x 20 reps; P, NW ((L) hip)    Shuttle: (R/L) leg press 5b 2 x 20 reps ea; NE tired    Shuttle: (B) heel raises 3b x 10 reps; NE tired    Monster walk fwd/bkwd with redTB and Denise-ModA 2 laps x 10 feet; NE (patient nervous)    PERRI over rail x 10 reps; NE       NuStep LE only G3-1-5-6-7-8-2 x 10 mins; NE tired    Shuttle: (B) leg press 7b 2 x 20 reps; P, NW (ache (L) groin) tired    Shuttle: (R/L) leg press 5b 2 x 20 reps ea; NE tired    Shuttle: (B) heel raises 3b x 10 reps; NE tired    Forward and backward  walk fwd/bkwd with  Denise-ModA 3 laps x 10-5 feet; NE (patient nervous)     NuStep LE only K9-5-6-6-7-8-10-2 x 10 mins; NE tired    Shuttle: (B) leg press 7b 2 x 20 reps; P, NW (ache (L) groin) tired    Shuttle: (R/L) leg press 5b 2 x 20 reps ea; NE tired    Shuttle: (B) heel raises 3b 2 x 10 reps; NE tired    (B) UE extension greenTB 2 x 5 reps x 5-10 cts ea; NE            Date: 8/22/2023  Tx#: 8/18 Date: 9/15/2023  Tx#: 9/18 Date: 9/19/2023  Tx#: 10/18 Date: 9/29/2023  Tx#: 11/18 Date: 10/5/2023  Tx#: 12/18 Date: 10/11/2023  Tx#: 13/18   NuStep LE only T3-5-4-6-7-8-10-2 x 10 mins; NE tired    Shuttle: (B) leg press 7b 2 x 20 reps; P, NW (ache (L) groin) tired    Shuttle: (R/L) leg press 5b x 20 reps ea; NE tired    Shuttle: (B) heel raises 3b x 10 reps; NE tired    (B) UE extension greenTB x 10 reps x 5-10 cts ea; NE  NuStep LE only Z3-8-0-6-7-8-2 x 10 mins; NE tired    Shuttle: (B) leg press 7b 2 x 20 reps; NE    Shuttle: SL leg press 5b (L) 20 + 15 reps; (R) 13+ 10 reps; NE tired    PERRI over rail x 10 reps; P, NW mid back stretch/ache    Sitting posture correction with lumbar roll    (B) UE extension greenTB 10 reps x 10 cts ea; NE tired    Gait training fwd/bkwd with CGA 3 x 1-15 feet; NE (less nervous) NuStep LE only W1-9-3-6-7-8-2 x 10 mins; NE tired    Shuttle: (B) leg press 8b 2 x 20 reps; NE    Shuttle: SL leg press 5b (L) 15 + 20 reps; 5b (R) 2 x 15 reps; NE tired    PERRI over rail x 10 reps; P, NW mid back stretch/ache    Gait training fwd/bkwd with CGA 2 x 15-20 feet; NE (less nervous)    Step up forward onto a 6 inch step (L) LE lead x 10 reps; (R) LE lead x 6 reps         NuStep LE only R0-3-3-6-7-8-2 x 10 mins; NE tired    Shuttle: (B) leg press 8b 2 x 20 reps; NE    Shuttle: SL leg press 5b (L) 20+18 reps; 5b (R) x 16+18 reps; NE tired    Parallel bars step up forward onto a 6 inch step with 3 lbs (R/L) LE x 20 reps; NE tired    PERRI over rail x 10 reps; P, NW mid back stretch/ache NuStep LE only T0-7-0-6-7-8-2 x 10 mins; NE tired    Shuttle: (B) leg press 8b 2 x 20 reps; NE    Shuttle: SL leg press 5b (L) 2 x 20 reps; 5b (R) 2 x 20 reps; NE tired    Seated: (L) Hip flexion with blueTB abd resist 5 sets x 5 reps ea; NE    Parallel bars step up forward onto a 8 inch step with 4 lbs (R/L) LE x 10+5 reps; NE tired    PERRI over rail x 10 reps; P, NW mid back stretch/ache NuStep LE only P8-6-6-6-7-8-2 x 10 mins; NE tired    Shuttle: (B) leg press 8b 2 x 20 reps; NE    Shuttle: SL leg press 5b (L) 2 x 20 reps; 5b (R) 2 x 20 reps; NE tired    Seated: (L) Hip flexion with blueTB abd resist 5 sets x 5 reps ea; NE    Parallel bars step up forward onto a 8 inch step with 4 lbs (R/L) LE x 10+5 reps; NE tired    PERRI over rail x 10 reps; P, NW mid back stretch/ache     Date: 10/13/2023  Tx#: 14/18 Date: 10/18/2023  Tx#: 15/18 Date: 10/20/2023  Tx#: 16/18 Date: 10/31/2023  Tx#: 17/18     NuStep LE only G1-5-0-8-9-10-11-2 x 10 mins; NE tired    Shuttle: (B) leg press 8b x 20+25 reps; NE    Shuttle: SL leg press 6b (L) x 20 reps; 5b (R) x 20 reps; NE tired    Seated: (L) Hip flexion with blackTB abd resist 10 sets x 10 reps ea; NE    PERRI over rail x 10 reps; NE    Parallel bars step up forward onto a 8 inch step with 4 lbs (R/L) LE x 10+5 reps; NE tired    PERRI over rail x 10 reps; P, NW mid back stretch/ache NuStep LE only Y7-5-8-8-9-10-11-2 x 10 mins; NE tired    Shuttle: (B) leg press 8b x 25+20reps; NE    Shuttle: SL leg press 6b (L) x 20 reps; 6b (R) x 15 reps; NE tired     Seated: (L) Hip flexion with blackTB abd resist 10 sets x 5 reps ea; NE    Parallel bars step up forward onto a 8 inch step with 4 lbs (R/L) LE x 10+5 reps; NE tired    PERRI over rail x 10 reps; P, NW mid back stretch/ache     NuStep LE only R3-7-1-8-9-10-11-2 x 10 mins; NE tired    Shuttle: (B) leg press 8b x 25+20reps; NE    Shuttle: SL leg press 6b (L) x 25 reps; 6b (R) x 15 reps; NE tired     Seated: (L/R) Hip flexion with 5 lb weight 2 x 5 sets x 5 reps ea; NE tired    Parallel bars step up forward onto a 8 inch step with 5 lbs (R/L) LE lead x 10+5 reps; NE tired    PERRI over rail x 10 reps; P, NW mid back stretch/ache NuStep LE only T5-0-5-8-9-10-11-2 x 10 mins; NE tired    Shuttle: (B) leg press 8b x 25+20reps; NE    Shuttle: SL leg press 6b (L) x 25 reps; 6b (R) x 15 reps; NE tired     Seated: (L/R) Hip resisted extension with blackTB x 20 reps; NE           Assessment/HEP:   Rayray Ventura was able to lift her (L) LE onto the machine (NuStep and Shuttle) without pain. She continue to progress with increased resistance and repetitions. She was reminded of safety while ambulating and transferring using the rolling walker for safety. She required moderate cueing to correct (R) genu valgus position during her exercises and ambulation. All questions and concerns were answered and addressed at this time. Goals:    (18 visits)  1. Patient to consistently perform their HEP and it's progression to maintain their improved condition. 2. Patient to have reduced and abolished symptoms to to be able o sit, rise up from sitting, walk, climb stairs, put on under pants, dress up, and wake up in the morning without producing or increasing symptoms in the (L) hip/groin area. 3. Patient to have WNL of (L) Hip AROM in all directions with 4/5 MMT in all motions to promote all home, work, recreational, and functional activities without discomfort or deviation.      Plan: Re-assess next session and continue with strengthening, stability exercises, gait training, directional preference exercise, posture correction, patient education, and HEP progression. Charges:  TherEx x 3       Total Timed Treatment: 44 mins Total Treatment Time: 47 mins

## 2023-11-07 ENCOUNTER — APPOINTMENT (OUTPATIENT)
Dept: PHYSICAL THERAPY | Age: 83
End: 2023-11-07
Attending: ORTHOPAEDIC SURGERY
Payer: MEDICARE

## 2023-11-10 ENCOUNTER — OFFICE VISIT (OUTPATIENT)
Dept: PHYSICAL THERAPY | Age: 83
End: 2023-11-10
Attending: ORTHOPAEDIC SURGERY
Payer: MEDICARE

## 2023-11-10 PROCEDURE — 97110 THERAPEUTIC EXERCISES: CPT | Performed by: PHYSICAL THERAPIST

## 2023-11-10 NOTE — PROGRESS NOTES
Date: 11/10/2023     Dx: (L) Femur ORIF          Authorized # of Visits:  25       Referring MD:  Danielle Dominguez MD visit: none scheduled    Medication Changes since last visit?: No    Subjective: Judy Innocent report that her (L) hip is not hurting. She still occasionally feel the (L) hip ache (1/10) when she lift the leg up but it always goes away when she bring the leg down. She has difficulty putting her pants on in the morning. She says that she is doing her HEP for the (L) LE (seated hip flexion). Objective: Ambulatory with a rolling walker with straighter posture, wide KEKE, and equal step length but decreased heel-toe pattern. No pain/ache (L) hip.      Date: 7/13/2023  Tx#: 2/18 Date: 7/19/2023  Tx#: 3/18 Date: 7/21/2023  Tx#: 4/18 Date: 8/4/2023  Tx#: 5/18 Date: 8/16/2023  Tx#: 6/18 Date: 8/18/2023  Tx#: 7/18   NuStep LE only L1-3-1 x 10 mins; NE    Supine: (L) SLR 2 x 10 reps x 10 cts ea; P, NW tired    Supine: (B) LE bridge 10 reps x 10 cts ea; NE tired    Supine: (B) Hip abduction with greenTB 2 x 10 reps x 10 cts ea; NE    Seated: (R) elbow extension with supination x 10 reps; P, NW     Seated: (R) elbow eccentric extension with redTB 5 reps x 5 cts ea; P, NW tired NuStep LE only L1-2-4-5 -2 x 10 mins; NE tired    Shuttle: (B) leg press 5b x x 20 reps; NE     Shuttle: (R/L) leg press 3b x 20 reps ea; NE tired    Shuttle: (R) calf raise 2b x 10 reps; NE tired     - unable to do the (L) calf raise    Ambulation with rolling walker and cues to widen KEKE and stand up straight    Standing (R/L) hip abduction with redTB 2 x 10 reps ea; NE tired    (B) UE extension greenTB 10 reps x 10 cts ea; NE tired NuStep LE only L1-2-4-5 -2 x 10 mins; NE tired    Shuttle: (B) leg press 5b 2 x 20 reps; P, NW ((L) hip)    Shuttle: (R/L) leg press 4b 2 x 20 reps ea; NE tired    Ambulation with rolling walker and cues to widen KEKE and stand up straight    (B) UE extension greenTB 10 reps x 10 cts ea; NE tired     NuStep LE only W6-8-4-5-6-7-2 x 10 mins; NE tired    Shuttle: (B) leg press 6b 2 x 20 reps; P, NW ((L) hip)    Shuttle: (R/L) leg press 5b 2 x 20 reps ea; NE tired    Shuttle: (B) heel raises 3b x 10 reps; NE tired    Monster walk fwd/bkwd with redTB and Denise-ModA 2 laps x 10 feet; NE (patient nervous)    PERRI over rail x 10 reps; NE       NuStep LE only L9-1-0-6-7-8-2 x 10 mins; NE tired    Shuttle: (B) leg press 7b 2 x 20 reps; P, NW (ache (L) groin) tired    Shuttle: (R/L) leg press 5b 2 x 20 reps ea; NE tired    Shuttle: (B) heel raises 3b x 10 reps; NE tired    Forward and backward  walk fwd/bkwd with  Denise-ModA 3 laps x 10-5 feet; NE (patient nervous)     NuStep LE only R3-1-4-6-7-8-10-2 x 10 mins; NE tired    Shuttle: (B) leg press 7b 2 x 20 reps; P, NW (ache (L) groin) tired    Shuttle: (R/L) leg press 5b 2 x 20 reps ea; NE tired    Shuttle: (B) heel raises 3b 2 x 10 reps; NE tired    (B) UE extension greenTB 2 x 5 reps x 5-10 cts ea; NE            Date: 8/22/2023  Tx#: 8/18 Date: 9/15/2023  Tx#: 9/18 Date: 9/19/2023  Tx#: 10/18 Date: 9/29/2023  Tx#: 11/18 Date: 10/5/2023  Tx#: 12/18 Date: 10/11/2023  Tx#: 13/18   NuStep LE only J0-8-5-6-7-8-10-2 x 10 mins; NE tired    Shuttle: (B) leg press 7b 2 x 20 reps; P, NW (ache (L) groin) tired    Shuttle: (R/L) leg press 5b x 20 reps ea; NE tired    Shuttle: (B) heel raises 3b x 10 reps; NE tired    (B) UE extension greenTB x 10 reps x 5-10 cts ea; NE  NuStep LE only G0-2-2-6-7-8-2 x 10 mins; NE tired    Shuttle: (B) leg press 7b 2 x 20 reps; NE    Shuttle: SL leg press 5b (L) 20 + 15 reps; (R) 13+ 10 reps; NE tired    PERRI over rail x 10 reps; P, NW mid back stretch/ache    Sitting posture correction with lumbar roll    (B) UE extension greenTB 10 reps x 10 cts ea; NE tired    Gait training fwd/bkwd with CGA 3 x 1-15 feet; NE (less nervous) NuStep LE only S4-9-0-6-7-8-2 x 10 mins; NE tired    Shuttle: (B) leg press 8b 2 x 20 reps; NE    Shuttle: SL leg press 5b (L) 15 + 20 reps; 5b (R) 2 x 15 reps; NE tired    PERRI over rail x 10 reps; P, NW mid back stretch/ache    Gait training fwd/bkwd with CGA 2 x 15-20 feet; NE (less nervous)    Step up forward onto a 6 inch step (L) LE lead x 10 reps; (R) LE lead x 6 reps         NuStep LE only P4-5-1-6-7-8-2 x 10 mins; NE tired    Shuttle: (B) leg press 8b 2 x 20 reps; NE    Shuttle: SL leg press 5b (L) 20+18 reps; 5b (R) x 16+18 reps; NE tired    Parallel bars step up forward onto a 6 inch step with 3 lbs (R/L) LE x 20 reps; NE tired    PERRI over rail x 10 reps; P, NW mid back stretch/ache NuStep LE only D4-2-0-6-7-8-2 x 10 mins; NE tired    Shuttle: (B) leg press 8b 2 x 20 reps; NE    Shuttle: SL leg press 5b (L) 2 x 20 reps; 5b (R) 2 x 20 reps; NE tired    Seated: (L) Hip flexion with blueTB abd resist 5 sets x 5 reps ea; NE    Parallel bars step up forward onto a 8 inch step with 4 lbs (R/L) LE x 10+5 reps; NE tired    PERRI over rail x 10 reps; P, NW mid back stretch/ache NuStep LE only P9-0-6-6-7-8-2 x 10 mins; NE tired    Shuttle: (B) leg press 8b 2 x 20 reps; NE    Shuttle: SL leg press 5b (L) 2 x 20 reps; 5b (R) 2 x 20 reps; NE tired    Seated: (L) Hip flexion with blueTB abd resist 5 sets x 5 reps ea; NE    Parallel bars step up forward onto a 8 inch step with 4 lbs (R/L) LE x 10+5 reps; NE tired    PERRI over rail x 10 reps; P, NW mid back stretch/ache     Date: 10/13/2023  Tx#: 14/18 Date: 10/18/2023  Tx#: 15/18 Date: 10/20/2023  Tx#: 16/18 Date: 10/31/2023  Tx#: 17/18 Date: 11/10/2023  Tx#: 18/18    NuStep LE only A3-5-0-8-9-10-11-2 x 10 mins; NE tired    Shuttle: (B) leg press 8b x 20+25 reps; NE    Shuttle: SL leg press 6b (L) x 20 reps; 5b (R) x 20 reps; NE tired    Seated: (L) Hip flexion with blackTB abd resist 10 sets x 10 reps ea; NE    PERRI over rail x 10 reps; NE    Parallel bars step up forward onto a 8 inch step with 4 lbs (R/L) LE x 10+5 reps; NE tired    PERRI over rail x 10 reps; P, NW mid back stretch/ache NuStep LE only U7-0-5-8-9-10-11-2 x 10 mins; NE tired    Shuttle: (B) leg press 8b x 25+20reps; NE    Shuttle: SL leg press 6b (L) x 20 reps; 6b (R) x 15 reps; NE tired     Seated: (L) Hip flexion with blackTB abd resist 10 sets x 5 reps ea; NE    Parallel bars step up forward onto a 8 inch step with 4 lbs (R/L) LE x 10+5 reps; NE tired    PERRI over rail x 10 reps; P, NW mid back stretch/ache     NuStep LE only A2-1-2-8-9-10-11-2 x 10 mins; NE tired    Shuttle: (B) leg press 8b x 25+20reps; NE    Shuttle: SL leg press 6b (L) x 25 reps; 6b (R) x 15 reps; NE tired     Seated: (L/R) Hip flexion with 5 lb weight 2 x 5 sets x 5 reps ea; NE tired    Parallel bars step up forward onto a 8 inch step with 5 lbs (R/L) LE lead x 10+5 reps; NE tired    PERRI over rail x 10 reps; P, NW mid back stretch/ache NuStep LE only Q4-9-9-8-9-10-11-2 x 10 mins; NE tired    Shuttle: (B) leg press 8b x 25+20reps; NE    Shuttle: SL leg press 6b (L) x 25 reps; 6b (R) x 15 reps; NE tired     Seated: (L/R) Hip resisted extension with blackTB x 20 reps; NE     NuStep LE only O9-7-3-8-9-10-11-2 x 10 mins; NE tired    Seated: (L) hip flexion with 3 lbs 2 x 20 reps; P, NW tired    Shuttle: (B) leg press 8b x 25+20reps; NE    Shuttle: SL leg press 6b (L) x 20 reps; 6b (R) x 20 reps; NE tired     PERRI over rail x 10 reps; NE         Assessment/HEP:   Joann Benson was reviewed (L) hip flexion in sitting with resistance (3 lbs) to improve (L) hip flexion ability to assist in putting her pants on. She was also reminded to do PERRI over a counter or table or dresser and to sit up straight on a good/supportive chair with a lumbar roll. Her (L) hip has WFL of AROM with 3+/5 to 4-/5 flexion and abduction strength. She did not report any pain after her treatment but reported an ache rated 1/10 in the (L) groin during hip flexion with resistance. She ambulates safely with a rolling walker with verbal cueing to correct posture while walking.  She will be scheduling a visit with the MD and will discuss her next steps. Goals:    (18 visits)  1. Patient to consistently perform their HEP and it's progression to maintain their improved condition. 2. Patient to have reduced and abolished symptoms to to be able o sit, rise up from sitting, walk, climb stairs, put on under pants, dress up, and wake up in the morning without producing or increasing symptoms in the (L) hip/groin area. 3. Patient to have WNL of (L) Hip AROM in all directions with 4/5 MMT in all motions to promote all home, work, recreational, and functional activities without discomfort or deviation. Plan:   Judy Murphy will schedule an appointment with her MD and will discuss her next steps. Charges:  TherEx x 3       Total Timed Treatment: 40 mins Total Treatment Time: 44 mins

## 2024-05-08 ENCOUNTER — HOSPITAL ENCOUNTER (OUTPATIENT)
Age: 84
Discharge: HOME | End: 2024-05-08
Payer: MEDICARE

## 2024-05-08 ENCOUNTER — APPOINTMENT (OUTPATIENT)
Dept: GENERAL RADIOLOGY | Age: 84
End: 2024-05-08
Attending: NURSE PRACTITIONER
Payer: MEDICARE

## 2024-05-08 VITALS
SYSTOLIC BLOOD PRESSURE: 127 MMHG | RESPIRATION RATE: 18 BRPM | DIASTOLIC BLOOD PRESSURE: 55 MMHG | HEART RATE: 66 BPM | OXYGEN SATURATION: 96 % | TEMPERATURE: 97 F

## 2024-05-08 DIAGNOSIS — M25.532 WRIST PAIN, ACUTE, LEFT: Primary | ICD-10-CM

## 2024-05-08 DIAGNOSIS — M11.20 CHONDROCALCINOSIS: ICD-10-CM

## 2024-05-08 DIAGNOSIS — M19.032 OSTEOARTHRITIS OF LEFT WRIST, UNSPECIFIED OSTEOARTHRITIS TYPE: ICD-10-CM

## 2024-05-08 DIAGNOSIS — M25.432 SWELLING OF JOINT OF LEFT WRIST: ICD-10-CM

## 2024-05-08 PROCEDURE — 73110 X-RAY EXAM OF WRIST: CPT | Performed by: NURSE PRACTITIONER

## 2024-05-08 PROCEDURE — 99203 OFFICE O/P NEW LOW 30 MIN: CPT | Performed by: NURSE PRACTITIONER

## 2024-05-08 RX ORDER — DABIGATRAN ETEXILATE 150 MG/1
150 CAPSULE ORAL 2 TIMES DAILY
COMMUNITY

## 2024-05-08 NOTE — ED PROVIDER NOTES
Patient Seen in: [facility]      History     Chief Complaint   Patient presents with    Wrist Pain     Stated Complaint: Left Arm Injury    Subjective:   HPI    83 yr old female, on Pradaxa for Afib and pacemaker, HLD, HTN, Asthma, Fibromyalgia, here for left wrist pain, redness and swelling that she noticed Saturday. She denies any falls, trauma or injury that she can remember prior to this. She denies any bug bite or other injury. She denies numbness or tingling, fevers, chills, sweats, bleeding or loss of mobility. She reports pain radiates from wrist up arm and down fingers, and has increased pain with finger movement and pronating arm. Light touch is very sensitive. She denies previous injury or surgery.    Objective:   Past Medical History:    A-fib (HCC)    Arthritis    Asthma (HCC)    Breast cancer, right (HCC)    Fibromyalgia    Hypothyroid    Kidney stones    Neuropathy    Other and unspecified hyperlipidemia    Pacemaker    Unspecified essential hypertension    Varicose veins              Past Surgical History:   Procedure Laterality Date    Chemotherapy  09    Knee replacement surgery      bilateral    Lumpectomy right  10-09    Radiation right  09    Spinal surgery - external      Total abdominal hysterectomy      Xr + pacemaker insertion (cpt=71090)                  Social History     Socioeconomic History    Marital status:    Tobacco Use    Smoking status: Former     Current packs/day: 1.00     Average packs/day: 1 pack/day for 5.0 years (5.0 ttl pk-yrs)     Types: Cigarettes    Smokeless tobacco: Never   Vaping Use    Vaping status: Never Used   Substance and Sexual Activity    Alcohol use: No    Drug use: No     Social Determinants of Health     Financial Resource Strain: Low Risk  (1/29/2024)    [facility]    Overall Financial Resource Strain (CARDIA)     Difficulty of Paying Living Expenses: Not hard at all   Food Insecurity: Low Risk  (2/14/2024)    Received  from [facility] Food Security     Within the past 12 months, the food you bought just didn't last and you didn't have money to get more.: 3     Within the past 12 months, you worried that your food would run out before you got money to buy more.: 3   Transportation Needs: Not At Risk (2/14/2024)    Received from [facility] Transportation Needs     In the past 12 months, has lack of reliable transportation kept you from medical appointments, meetings, work or from getting things needed for daily living?: No   Physical Activity: Unknown (2/14/2024)    Received from [facility]    Physical Activity     On average, how many days per week do you engage in moderate to strenuous exercise (like a brisk walk)?: Patient declined     On average, how many minutes do you engage in exercise at this level?: Patient declined    [facility]    Social Connections   Housing Stability: Not At Risk (2/14/2024)    Received from [facility] Housing     What is your living situation today?: I have a steady place to live     Think about the place you live. Do you have problems with any of the following?: None of the above              Review of Systems    Positive for stated complaint: Left Arm Injury  Other systems are as noted in HPI.  Constitutional and vital signs reviewed.      All other systems reviewed and negative except as noted above.    Physical Exam     ED Triage Vitals [05/08/24 1535]   /55   Pulse 66   Resp 18   Temp 97.3 °F (36.3 °C)   Temp src Temporal   SpO2 96 %   O2 Device None (Room air)       Current Vitals:   Vital Signs  BP: 127/55  Pulse: 66  Resp: 18  Temp: 97.3 °F (36.3 °C)  Temp src: Temporal    Oxygen Therapy  SpO2: 96 %  O2 Device: None (Room air) (Simultaneous filing. User may not have seen previous data.)            Physical Exam  Vitals and nursing note reviewed.   Constitutional:       General: She is in acute  distress.      Appearance: Normal appearance. She is not ill-appearing or toxic-appearing.   HENT:      Mouth/Throat:      Mouth: Mucous membranes are moist.   Eyes:      Pupils: Pupils are equal, round, and reactive to light.   Cardiovascular:      Rate and Rhythm: Normal rate.      Pulses: Normal pulses.   Pulmonary:      Effort: Pulmonary effort is normal. No respiratory distress.   Musculoskeletal:      Left shoulder: Normal. No swelling or tenderness. Normal range of motion.      Left upper arm: Normal. No swelling or tenderness.      Left elbow: Normal. No swelling. Normal range of motion. No tenderness.      Left forearm: Normal. No swelling, edema, tenderness or bony tenderness.      Left wrist: Swelling, tenderness and bony tenderness present. No deformity, effusion, lacerations, snuff box tenderness or crepitus. Decreased range of motion. Normal pulse.      Left hand: No swelling, deformity, tenderness or bony tenderness. Normal range of motion. Decreased strength. Normal sensation. There is no disruption of two-point discrimination. Normal capillary refill. Normal pulse.   Skin:     General: Skin is warm.      Findings: No bruising, erythema or rash.   Neurological:      Mental Status: She is alert and oriented to person, place, and time.   Psychiatric:         Mood and Affect: Mood normal.         Behavior: Behavior normal.               ED Course   Labs Reviewed - No data to display       ED Course as of 05/08/24 1711  ------------------------------------------------------------  Time: 05/08 1622  Value: XR WRIST COMPLETE (MIN 3 VIEWS), LEFT (CPT=73110)  Comment: No acute fracture or dislocation is seen.  Lucency just proximal to the radial styloid process of uncertain clinical significance.  In the right clinical setting this finding may represent an osseous erosion.      Severe degenerative joint disease at the 1st carpometacarpal joint.  Moderate degenerative joint disease elsewhere in the wrist.   Chondrocalcinosis.      Vascular calcifications.  Soft tissue swelling of the wrist.  No emphysema.                   MDM     83 yr old female here for evaluation of left wrist pain, swelling and redness x Saturday. Denies fall, trauma, fever or chills.    ON exam, pt well appearing otherwise. Sitting in pt owned wheelchair. Localized swelling and redness with warmth, to left wrist, especially palmar side. Radial and brachial pulse normal. Skin warm and dry. Cap refill normal. Pt has increased pain with pronation. Pt able to make fist but with increased pain, flare fingers and ok sign with no increased pain. Non-tender upper forearm, elbow, upper arm. PT able to lift arm overhead with no increased pain.     Diff dx: fracture vs gout vs cellulitis  XR ordered.    XR- DJD noted throughout wrist, possible osseous erosion at radial styloid process.  +Chondrocalcinosis  Soft tissue swelling. No emphysema    Discussed results with pt and . Discussed gout vs cellulitis.  PT has significant medical history and treatment for gout is limited. PT unable to take NSAIDS, Colchicine and prednisone is not best for her age group. Discussed this steroid only option if would like to trial.   PT and  would like to defer this treatment.  Discussed abx for cellulitis. PT and  would like to defer this treatment as well.    Discussed pain control with tylenol, Norco prn, cool compresses, wrist splint vs ace for support swelling.  Advised to call PCP to see if they can draw uric acid testing for gout diagnosis.  Discussed close fu with PCP or ortho (who they see for other chronic issues) for styloid process erosion follow up etc.    PT and  grateful no fracture and will continue conservative management at home. Discussed worsening symptoms, swelling, fever to go to ER for re-evaluation.  All questions answered. Return and ER precautions given.    Counseled: Patient, regarding diagnosis, regarding treatment plan,  regarding diagnostic results, regarding prescription, I have discussed with the patient the results of tests, differential diagnosis, and warning signs and symptoms that should prompt immediate return. The patient understands these instructions and agrees to the follow-up plan provided. There is no barriers to learning. Appropriate f/u given. Patient agrees to return for any concerns/ problems/complications.                                       Medical Decision Making      Disposition and Plan     Clinical Impression:  1. Wrist pain, acute, left    2. Chondrocalcinosis    3. Osteoarthritis of left wrist, unspecified osteoarthritis type    4. Swelling of joint of left wrist         Disposition:  Discharge  5/8/2024  4:45 pm    Follow-up:  Rosales Lopez  [address] [place] [id number]  [phone]    Call in 1 day            Medications Prescribed:  Discharge Medication List as of 5/8/2024  4:46 PM

## 2024-05-08 NOTE — DISCHARGE INSTRUCTIONS
Follow-up with your primary care provider and orthopedic in the next 24 to 48 hours.  Getting a uric acid or calcium level drawn would best to define if this is gout related or not.  Your primary care provider can order this    Take Tylenol every 6 hours as needed for pain.  Take your Norco if severe pain just be cautious with moving and getting up.  Use cool compresses or cold to area of pain 15 minutes on 2 hours off to help with swelling and pain    If redness continues, swelling increases, fevers and chills develop please return or seek care in the emergency room for further evaluation

## (undated) NOTE — LETTER
SPINE EVALUATION:   Referring Physician: Dr. Juliet Harry  Diagnosis: Abnormal gait (R26.9)  Rotator cuff syndrome of left shoulder (M75.102)  Radiculopathy, cervical (M54.12)   Date of Service: 10/11/2019   Date of Onset: June 2019    PATIENT Sanjay Seen M cervical and lumbar lordosis; posture correction relieved ache/pain in the neck, upper/lower back. Gait:   Uses a rolling walker (tall) with a narrow KEKE, short step length, decreased heel-toe, and push-off pattern; slow, safe, and deliberate gait.     R 4. Patient to consistently have good posture to promote her symptomfree condition. Patient was advised of these findings, precautions, and treatment options and has agreed to actively participate in planning/goal setting for this course of care.     Brady

## (undated) NOTE — LETTER
Patient Name: Rocio Adam  YOB: 1940          MRN number:  [id number]   LOWER EXTREMITY EVALUATION:   Referring Physician: Dr. Yuri Dietz  Diagnosis: (L) Femur ORIF   Date of Onset: 5/25/2023 (Rx date) Date of Service: 7/11/2023     PATIENT SUMMARY   Rocio Adam is a 81year old y/o female who presents to therapy today with complaints of intermittent pain/ache in the (L) anterior hip, groin, and knee rated 0-5/10. She is also reporting pain/ache and limited mobility of the (R) elbow. Kate Escobar lives with her  on the 3rd floor with 28 steps with (B) rails to get to their bedroom. She has a walk-in shower with grab bars and an electric recliner she likes to sit on. She also enjoys playing [employer]. History of current condition: Harini report that in 7/4/2022 she fell in her bathtub and fractured her (L) hip. She had surgery and was having physical therapy for the (L) hip. She then fractured her (R) elbow in February 2023 and had surgery for it as well. She had home health PT for about a month and is now referred now to outpatient physical therapy for evaluation and treatment. She is also diagnosed to have (B) LE neuropathy, Hx of breast CA, (B) TKR, (L) shoulder Sx, and pelvic Fx. Current functional limitation reported include inability to sit, rise up from sitting, walk, climb stairs, put on under pants, dress up, and wake up in the morning without producing or increasing symptoms in the (L) hip/groin area. Patient would like to to return to their previous level. ASSESSMENT:   Kate Escobar is presenting with an impaired (L) hip AROM, stability, strength. She has decreased balance and gait ability due to deconditioning from a fracture and a  surgical procedure. Kate Escobar would benefit from skilled Physical Therapy to address the above impairments.     Precautions:  Fall Risk, (L) Hip WBAT, (R) Elbow WBAT     OBJECTIVE:   Observation: Kyphotic, slouched, forward head, protruded shoulders in sittng. Gait: Ambulatory with a rolling walker with upright posture but jairo forward. Short steps with NBOS and decreased heel-toe and push-off pattern on the (L) LE.      ROM/MMT:  (Pre-test symptom: 0/10 (L) hip)  Hip   Flexion:  (L) Minimal loss; 3/5*;  (R) Nil loss; 4/5   Extension:  (L) Minimal loss*; 4-/5;  (R) Nil loss; 4/5    Adduction:  (L) Minimal loss; 4-/5;  (R) Nil loss; 4/5   Abduction: (L) Minimal loss*; 4/5;  (R) Nil loss; 4/5     (*) Pain at endrange and upon resistance     Patient education and instructions:   Patient education provided on range of motion exercises, safety at home with HEP, gait pattern correction, posture correction, and goal setting with patient, and HEP. Patient was instructed in and issued HEP for: Supine: (L) LE SLR 10 reps x 10 cts each, (B) LE bridging 10 reps x 10 cts each, Supine hookelying (B) hip abduction with greenTB (issued) 10 reps x 10 cts each 2-3x/day. Reviewed her home health exercises. Charges: PT Eval x1, TherEx x 1      Total Timed Treatment: 15 mins     Total Treatment Time: 48 mins      PLAN OF CARE:    Goals:  (18 visits)  1. Patient to consistently perform their HEP and it's progression to maintain their improved condition. 2. Patient to have reduced and abolished symptoms to to be able o sit, rise up from sitting, walk, climb stairs, put on under pants, dress up, and wake up in the morning without producing or increasing symptoms in the (L) hip/groin area. 3. Patient to have WNL of (L) Hip AROM in all directions with 4/5 MMT in all motions to promote all home, work, recreational, and functional activities without discomfort or deviation. Patient was advised of these findings, precautions, and treatment options and has agreed to actively participate in planning/goal setting for this course of care. Frequency / Duration: Patient will be seen for 2-1 x/week or a total of 18 visits over a 90 day period. Treatment will include: Directional preference exercises, Manual Therapy; Therapeutic Exercises; Neuromuscular Re-education; Gait Training; Electrical Stim; Patient education; Home exercise program instruction. Education or treatment limitation: None  Rehab Potential:good    In agreement with functional assessment testing score and clinical rationale, this evaluation involved Moderate Complexity decision making due to 1-2 personal factors/comorbidities, 4+ body structures involved/activity limitations, and unstable symptoms including changing pain levels and recent surgery on the (R) elbow. LEFS Score: Initial:  31.25    Patient/Family () was advised of these findings, precautions, and treatment options and has agreed to actively participate in planning and for this course of care. Thank you for your referral. Please co-sign or sign and return this letter via fax as soon as possible to [phone]. If you have any questions, please contact me at Dept: [phone]    Sincerely,  Electronically signed by therapist: Shelly Medrano PT Cert MDT    Physician's certification required: Yes  I certify the need for these services furnished under this plan of treatment and while under my care. X___________________________________________________ Date____________________    Certification From: 7/04/5272  To:10/9/2023              21st Century Cures Act Notice to Patient: Medical documents like this are made available to patients in the interest of transparency. However, be advised this is a medical document and it is intended as qlxb-mf-dult communication between your medical providers. This medical document may contain abbreviations, assessments, medical data, and results or other terms that are unfamiliar. Medical documents are intended to carry relevant information, facts as evident, and the clinical opinion of the practitioner.  As such, this medical document may be written in language that appears blunt or direct. You are encouraged to contact your medical provider and/or [facility] Patient Experience if you have any questions about this medical document.

## (undated) NOTE — LETTER
Patient Name: Rosina Brar  YOB: 1940          MRN :  [id number]  Date:  2/17/2023  Referring Physician:  No ref. provider found                 [facility] Notice to Patient: Medical documents like this are made available to patients in the interest of transparency. However, be advised this is a medical document and it is intended as [id number] communication between your medical providers. This medical document may contain abbreviations, assessments, medical data, and results or other terms that are unfamiliar. Medical documents are intended to carry relevant information, facts as evident, and the clinical opinion of the practitioner. As such, this medical document may be written in language that appears blunt or direct. You are encouraged to contact your medical provider and/or [facility] Experience if you have any questions about this medical document.

## (undated) NOTE — LETTER
Patient Name: Rosina Brar  YOB: 1940          MRN number:  [id number]   LOWER EXTREMITY EVALUATION:   Referring Physician:    Diagnosis:  Status post open reduction and internal fixation (ORIF) of fracture (Z98.890,Z87.81)  LBP   Date of Onset: 07/04/2022 Date of Service: 10/7/2022     PATIENT Jackelin Russ is a [de-identified]year old y/o female who presents to therapy today with complaints of intermittent (L) anterior and lateral groin/hip/thigh ache/soreness/pain rated 0-5/10. She also report numbness/tingling of the (L) foot. She lives with her  Heriberto Enamorado with 10 steps to enter the home with (B) rail (close together) and 28 steps to their bedroom also with (B) rail (close together). She has hardwood and carpeting for a aundrea and has been using a rolling walker at home. She has a walk-in shower with a bench to take showers. History of current condition: Elmer Ocampo report that she fell initially on January 2022 and fractured her (L) pelvis. She had physical therapy for 3.5 months and was released back home. She then fell in their washroom on July 4th and broke her (L) femur. She had surgery and had physical therapy (Rehab) for a couple of months. She is doing her exercises at home but is now referred to physical therapy for evaluation and treatment. Current functional limitation reported include inability to bend down, rise up from sitting, take the first few steps in the morning, walk, and climb up/donw stairs without producing or increasing symptoms in the (L) groin, lateral hip/buttock, and anterior thigh. Patient would like to to return to their previous level. ASSESSMENT:   Lida Rachel is presenting with an impaired (L) hip ROM, strength, mobility, stability, and balance due to recent and healing (L) hip surgery. She was instructed on her HEP and emphasized safety awareness and healing priority before doing too much exercises.   Discussed also the importance of posture correction in sitting while she is healing and while doing exercises. She was issued copies of her HEP. She agreed and understand the plan of care and all questions and concerns were answered and addressed at this time. Judy Murphy would benefit from skilled Physical Therapy to address the above impairments. Precautions:  Fall Risk, WBAR (L) LE.     OBJECTIVE:   Observation:  (L) Hip incision site healed; (-) Tenderness, (-) Swelling, (-) Redness;  (-) Homans (L) LE;  Slouched, kyphotic posture with forward/protruded head and shoulders. She required 2 pillows under her head in supinelying to feel comfortable. Gait:  No antalgic gait observed; NBOS, short equal step length with decreased heel-toe and push-off on (L) LE; uses a rolling walker at home and in the department with CGA to SBA. Minimal cueing needed for safety awareness during sit to stand to sit. ROM/MMT:  (Pre-test symptom: 0/10 (L) hip/groin/thigh)  Hip   Flexion:  90 degs*/ 3+/5    Abduction:  30 degs*/ 3+/5   Adduction:  Neutral/ 3+/5  Extension:  N.T./ 3+/5    (*) pain at endrange     Patient education and instructions:   Patient education provided on derangement principle, directional preference exercise, establishing a test motion to assess improvement, goal setting with patient, and HEP. Patient was instructed in and issued HEP for: Supineying (L) LE Quadricep and Hamstring isometrics 10 reps x 10 cts ea,  (L) SLR 10 reps x 5-10 cts ea; Hooklying (B) hip abduction with redTB (issued) 10 reps x 10 cts ea;  2-3x/day. Charges: PT Eval x1, TherEx x 2      Total Timed Treatment: 30 mins     Total Treatment Time: 60 mins     PLAN OF CARE:    Goals:  (18 visits)  1. Patient to consistently perform their HEP and it's progression to maintain their improved condition.   2. Patient to have reduced and abolished symptoms to to be able to bend down, rise up from sitting, take the first few steps in the morning, walk, and climb up/donw stairs without producing or increasing symptoms in the (L) groin, lateral hip/buttock, and anterior thigh. 3. Patient to have WNL of (L) Hip AROM in all directions with 4/5 MMT in all motions to promote all home, work, recreational, and functional activities without discomfort or deviation. Patient was advised of these findings, precautions, and treatment options and has agreed to actively participate in planning/goal setting for this course of care. Frequency / Duration: Patient will be seen for 2-1 x/week or a total of 18 visits over a 90 day period. Treatment will include: Directional preference exercises, Manual Therapy; Therapeutic Exercises; Neuromuscular Re-education; Gait Training; Electrical Stim; Patient education; Home exercise program instruction. Education or treatment limitation: None  Rehab Potential:good    In agreement with FOTO score and clinical rationale, this evaluation involved Moderate Complexity decision making due to 3+ personal factors/comorbidities, 4+ body structures involved/activity limitations, and unstable symptoms including changing pain levels and (L) LE weight bearing status. Patric Shirley FOTO: Initial:  30/100;  Goal: 52/100    Patient/Family (/David) was advised of these findings, precautions, and treatment options and has agreed to actively participate in planning and for this course of care. Thank you for your referral. Please co-sign or sign and return this letter via fax as soon as possible to [phone]. If you have any questions, please contact me at Dept: [phone]    Sincerely,  Electronically signed by therapist: Charly Peters PT Cert MDT    Physician's certification required: Yes  I certify the need for these services furnished under this plan of treatment and while under my care.     X___________________________________________________ Date____________________    Certification From: 02/7/1320  To:1/5/2023          21st Century Cures Act Notice to Patient: Medical documents like this are made available to patients in the interest of transparency. However, be advised this is a medical document and it is intended as [id number]-gcrn communication between your medical providers. This medical document may contain abbreviations, assessments, medical data, and results or other terms that are unfamiliar. Medical documents are intended to carry relevant information, facts as evident, and the clinical opinion of the practitioner. As such, this medical document may be written in language that appears blunt or direct. You are encouraged to contact your medical provider and/or [facility] Patient Experience if you have any questions about this medical document.